# Patient Record
Sex: MALE | Race: WHITE | NOT HISPANIC OR LATINO | Employment: OTHER | URBAN - METROPOLITAN AREA
[De-identification: names, ages, dates, MRNs, and addresses within clinical notes are randomized per-mention and may not be internally consistent; named-entity substitution may affect disease eponyms.]

---

## 2023-04-24 LAB
ANION GAP IN SER/PLAS: 12 MMOL/L (ref 10–20)
CALCIUM (MG/DL) IN SER/PLAS: 9.5 MG/DL (ref 8.6–10.3)
CARBON DIOXIDE, TOTAL (MMOL/L) IN SER/PLAS: 31 MMOL/L (ref 21–32)
CHLORIDE (MMOL/L) IN SER/PLAS: 101 MMOL/L (ref 98–107)
CREATININE (MG/DL) IN SER/PLAS: 0.96 MG/DL (ref 0.5–1.3)
ERYTHROCYTE DISTRIBUTION WIDTH (RATIO) BY AUTOMATED COUNT: 13.4 % (ref 11.5–14.5)
ERYTHROCYTE MEAN CORPUSCULAR HEMOGLOBIN CONCENTRATION (G/DL) BY AUTOMATED: 32.2 G/DL (ref 32–36)
ERYTHROCYTE MEAN CORPUSCULAR VOLUME (FL) BY AUTOMATED COUNT: 91 FL (ref 80–100)
ERYTHROCYTES (10*6/UL) IN BLOOD BY AUTOMATED COUNT: 5.4 X10E12/L (ref 4.5–5.9)
GFR MALE: 79 ML/MIN/1.73M2
GLUCOSE (MG/DL) IN SER/PLAS: 74 MG/DL (ref 74–99)
HEMATOCRIT (%) IN BLOOD BY AUTOMATED COUNT: 49 % (ref 41–52)
HEMOGLOBIN (G/DL) IN BLOOD: 15.8 G/DL (ref 13.5–17.5)
LEUKOCYTES (10*3/UL) IN BLOOD BY AUTOMATED COUNT: 4.9 X10E9/L (ref 4.4–11.3)
PLATELETS (10*3/UL) IN BLOOD AUTOMATED COUNT: 210 X10E9/L (ref 150–450)
POTASSIUM (MMOL/L) IN SER/PLAS: 4 MMOL/L (ref 3.5–5.3)
SODIUM (MMOL/L) IN SER/PLAS: 140 MMOL/L (ref 136–145)
UREA NITROGEN (MG/DL) IN SER/PLAS: 14 MG/DL (ref 6–23)

## 2023-08-16 LAB
ALBUMIN (G/DL) IN SER/PLAS: 4.6 G/DL (ref 3.4–5)
ANION GAP IN SER/PLAS: 14 MMOL/L (ref 10–20)
CALCIUM (MG/DL) IN SER/PLAS: 9.3 MG/DL (ref 8.6–10.3)
CARBON DIOXIDE, TOTAL (MMOL/L) IN SER/PLAS: 30 MMOL/L (ref 21–32)
CHLORIDE (MMOL/L) IN SER/PLAS: 97 MMOL/L (ref 98–107)
CREATININE (MG/DL) IN SER/PLAS: 0.95 MG/DL (ref 0.5–1.3)
GFR MALE: 80 ML/MIN/1.73M2
GLUCOSE (MG/DL) IN SER/PLAS: 121 MG/DL (ref 74–99)
PHOSPHATE (MG/DL) IN SER/PLAS: 3.2 MG/DL (ref 2.5–4.9)
POTASSIUM (MMOL/L) IN SER/PLAS: 4.8 MMOL/L (ref 3.5–5.3)
SODIUM (MMOL/L) IN SER/PLAS: 136 MMOL/L (ref 136–145)
UREA NITROGEN (MG/DL) IN SER/PLAS: 11 MG/DL (ref 6–23)

## 2024-06-26 ENCOUNTER — APPOINTMENT (OUTPATIENT)
Dept: RADIOLOGY | Facility: HOSPITAL | Age: 84
End: 2024-06-26
Payer: MEDICARE

## 2024-06-26 ENCOUNTER — APPOINTMENT (OUTPATIENT)
Dept: CARDIOLOGY | Facility: HOSPITAL | Age: 84
End: 2024-06-26
Payer: MEDICARE

## 2024-06-26 ENCOUNTER — HOSPITAL ENCOUNTER (INPATIENT)
Facility: HOSPITAL | Age: 84
End: 2024-06-26
Attending: STUDENT IN AN ORGANIZED HEALTH CARE EDUCATION/TRAINING PROGRAM | Admitting: INTERNAL MEDICINE
Payer: MEDICARE

## 2024-06-26 DIAGNOSIS — S22.32XA CLOSED FRACTURE OF ONE RIB OF LEFT SIDE, INITIAL ENCOUNTER: Primary | ICD-10-CM

## 2024-06-26 DIAGNOSIS — R09.02 HYPOXIA: ICD-10-CM

## 2024-06-26 PROBLEM — S22.31XD CLOSED FRACTURE OF ONE RIB OF RIGHT SIDE WITH ROUTINE HEALING: Status: ACTIVE | Noted: 2024-06-26

## 2024-06-26 LAB
ABO GROUP (TYPE) IN BLOOD: NORMAL
ALBUMIN SERPL BCP-MCNC: 4.7 G/DL (ref 3.4–5)
ALP SERPL-CCNC: 59 U/L (ref 33–136)
ALT SERPL W P-5'-P-CCNC: 20 U/L (ref 10–52)
ANION GAP SERPL CALC-SCNC: 13 MMOL/L (ref 10–20)
ANTIBODY SCREEN: NORMAL
AST SERPL W P-5'-P-CCNC: 21 U/L (ref 9–39)
BASOPHILS # BLD AUTO: 0.08 X10*3/UL (ref 0–0.1)
BASOPHILS NFR BLD AUTO: 1 %
BILIRUB SERPL-MCNC: 0.6 MG/DL (ref 0–1.2)
BUN SERPL-MCNC: 16 MG/DL (ref 6–23)
CALCIUM SERPL-MCNC: 9.5 MG/DL (ref 8.6–10.3)
CHLORIDE SERPL-SCNC: 98 MMOL/L (ref 98–107)
CO2 SERPL-SCNC: 29 MMOL/L (ref 21–32)
CREAT SERPL-MCNC: 1.04 MG/DL (ref 0.5–1.3)
EGFRCR SERPLBLD CKD-EPI 2021: 72 ML/MIN/1.73M*2
EOSINOPHIL # BLD AUTO: 0.19 X10*3/UL (ref 0–0.4)
EOSINOPHIL NFR BLD AUTO: 2.3 %
ERYTHROCYTE [DISTWIDTH] IN BLOOD BY AUTOMATED COUNT: 13.2 % (ref 11.5–14.5)
ETHANOL SERPL-MCNC: <10 MG/DL
GLUCOSE SERPL-MCNC: 126 MG/DL (ref 74–99)
HCT VFR BLD AUTO: 45.6 % (ref 41–52)
HGB BLD-MCNC: 15.2 G/DL (ref 13.5–17.5)
IMM GRANULOCYTES # BLD AUTO: 0.19 X10*3/UL (ref 0–0.5)
IMM GRANULOCYTES NFR BLD AUTO: 2.3 % (ref 0–0.9)
INR PPP: 0.9 (ref 0.9–1.1)
LACTATE SERPL-SCNC: 1.9 MMOL/L (ref 0.4–2)
LYMPHOCYTES # BLD AUTO: 2.3 X10*3/UL (ref 0.8–3)
LYMPHOCYTES NFR BLD AUTO: 27.7 %
MCH RBC QN AUTO: 29.9 PG (ref 26–34)
MCHC RBC AUTO-ENTMCNC: 33.3 G/DL (ref 32–36)
MCV RBC AUTO: 90 FL (ref 80–100)
MONOCYTES # BLD AUTO: 1.04 X10*3/UL (ref 0.05–0.8)
MONOCYTES NFR BLD AUTO: 12.5 %
NEUTROPHILS # BLD AUTO: 4.5 X10*3/UL (ref 1.6–5.5)
NEUTROPHILS NFR BLD AUTO: 54.2 %
NRBC BLD-RTO: 0 /100 WBCS (ref 0–0)
PLATELET # BLD AUTO: 203 X10*3/UL (ref 150–450)
POTASSIUM SERPL-SCNC: 3.8 MMOL/L (ref 3.5–5.3)
PROT SERPL-MCNC: 7.4 G/DL (ref 6.4–8.2)
PROTHROMBIN TIME: 10.5 SECONDS (ref 9.8–12.8)
RBC # BLD AUTO: 5.08 X10*6/UL (ref 4.5–5.9)
RH FACTOR (ANTIGEN D): NORMAL
SODIUM SERPL-SCNC: 136 MMOL/L (ref 136–145)
WBC # BLD AUTO: 8.3 X10*3/UL (ref 4.4–11.3)

## 2024-06-26 PROCEDURE — 86901 BLOOD TYPING SEROLOGIC RH(D): CPT | Performed by: STUDENT IN AN ORGANIZED HEALTH CARE EDUCATION/TRAINING PROGRAM

## 2024-06-26 PROCEDURE — 72125 CT NECK SPINE W/O DYE: CPT

## 2024-06-26 PROCEDURE — 96376 TX/PRO/DX INJ SAME DRUG ADON: CPT

## 2024-06-26 PROCEDURE — 2500000004 HC RX 250 GENERAL PHARMACY W/ HCPCS (ALT 636 FOR OP/ED): Performed by: STUDENT IN AN ORGANIZED HEALTH CARE EDUCATION/TRAINING PROGRAM

## 2024-06-26 PROCEDURE — 99291 CRITICAL CARE FIRST HOUR: CPT | Mod: 25 | Performed by: STUDENT IN AN ORGANIZED HEALTH CARE EDUCATION/TRAINING PROGRAM

## 2024-06-26 PROCEDURE — 72128 CT CHEST SPINE W/O DYE: CPT | Mod: RCN

## 2024-06-26 PROCEDURE — 72170 X-RAY EXAM OF PELVIS: CPT

## 2024-06-26 PROCEDURE — 82077 ASSAY SPEC XCP UR&BREATH IA: CPT | Performed by: STUDENT IN AN ORGANIZED HEALTH CARE EDUCATION/TRAINING PROGRAM

## 2024-06-26 PROCEDURE — 96374 THER/PROPH/DIAG INJ IV PUSH: CPT

## 2024-06-26 PROCEDURE — 85025 COMPLETE CBC W/AUTO DIFF WBC: CPT | Performed by: STUDENT IN AN ORGANIZED HEALTH CARE EDUCATION/TRAINING PROGRAM

## 2024-06-26 PROCEDURE — 72131 CT LUMBAR SPINE W/O DYE: CPT | Mod: RCN | Performed by: RADIOLOGY

## 2024-06-26 PROCEDURE — 72128 CT CHEST SPINE W/O DYE: CPT | Mod: RCN | Performed by: RADIOLOGY

## 2024-06-26 PROCEDURE — 2500000004 HC RX 250 GENERAL PHARMACY W/ HCPCS (ALT 636 FOR OP/ED)

## 2024-06-26 PROCEDURE — 85610 PROTHROMBIN TIME: CPT | Performed by: STUDENT IN AN ORGANIZED HEALTH CARE EDUCATION/TRAINING PROGRAM

## 2024-06-26 PROCEDURE — 99223 1ST HOSP IP/OBS HIGH 75: CPT | Performed by: INTERNAL MEDICINE

## 2024-06-26 PROCEDURE — 70450 CT HEAD/BRAIN W/O DYE: CPT | Performed by: RADIOLOGY

## 2024-06-26 PROCEDURE — 80053 COMPREHEN METABOLIC PANEL: CPT | Performed by: STUDENT IN AN ORGANIZED HEALTH CARE EDUCATION/TRAINING PROGRAM

## 2024-06-26 PROCEDURE — 70450 CT HEAD/BRAIN W/O DYE: CPT

## 2024-06-26 PROCEDURE — 72131 CT LUMBAR SPINE W/O DYE: CPT | Mod: RCN

## 2024-06-26 PROCEDURE — 86850 RBC ANTIBODY SCREEN: CPT | Performed by: STUDENT IN AN ORGANIZED HEALTH CARE EDUCATION/TRAINING PROGRAM

## 2024-06-26 PROCEDURE — 36415 COLL VENOUS BLD VENIPUNCTURE: CPT | Performed by: STUDENT IN AN ORGANIZED HEALTH CARE EDUCATION/TRAINING PROGRAM

## 2024-06-26 PROCEDURE — 73630 X-RAY EXAM OF FOOT: CPT | Mod: LEFT SIDE | Performed by: RADIOLOGY

## 2024-06-26 PROCEDURE — 71045 X-RAY EXAM CHEST 1 VIEW: CPT

## 2024-06-26 PROCEDURE — 71045 X-RAY EXAM CHEST 1 VIEW: CPT | Performed by: RADIOLOGY

## 2024-06-26 PROCEDURE — G0390 TRAUMA RESPONS W/HOSP CRITI: HCPCS

## 2024-06-26 PROCEDURE — 84075 ASSAY ALKALINE PHOSPHATASE: CPT | Performed by: STUDENT IN AN ORGANIZED HEALTH CARE EDUCATION/TRAINING PROGRAM

## 2024-06-26 PROCEDURE — 72125 CT NECK SPINE W/O DYE: CPT | Performed by: RADIOLOGY

## 2024-06-26 PROCEDURE — 96375 TX/PRO/DX INJ NEW DRUG ADDON: CPT

## 2024-06-26 PROCEDURE — 71260 CT THORAX DX C+: CPT | Performed by: RADIOLOGY

## 2024-06-26 PROCEDURE — 93005 ELECTROCARDIOGRAM TRACING: CPT

## 2024-06-26 PROCEDURE — 2550000001 HC RX 255 CONTRASTS: Performed by: STUDENT IN AN ORGANIZED HEALTH CARE EDUCATION/TRAINING PROGRAM

## 2024-06-26 PROCEDURE — 83605 ASSAY OF LACTIC ACID: CPT | Performed by: STUDENT IN AN ORGANIZED HEALTH CARE EDUCATION/TRAINING PROGRAM

## 2024-06-26 PROCEDURE — 74177 CT ABD & PELVIS W/CONTRAST: CPT

## 2024-06-26 PROCEDURE — 73630 X-RAY EXAM OF FOOT: CPT | Mod: LT

## 2024-06-26 PROCEDURE — 74177 CT ABD & PELVIS W/CONTRAST: CPT | Performed by: RADIOLOGY

## 2024-06-26 PROCEDURE — 72170 X-RAY EXAM OF PELVIS: CPT | Performed by: RADIOLOGY

## 2024-06-26 RX ORDER — HYDROMORPHONE HYDROCHLORIDE 1 MG/ML
INJECTION, SOLUTION INTRAMUSCULAR; INTRAVENOUS; SUBCUTANEOUS
Status: COMPLETED
Start: 2024-06-26 | End: 2024-06-26

## 2024-06-26 RX ORDER — ONDANSETRON HYDROCHLORIDE 2 MG/ML
INJECTION, SOLUTION INTRAVENOUS
Status: COMPLETED
Start: 2024-06-26 | End: 2024-06-26

## 2024-06-26 RX ORDER — DOCUSATE SODIUM 100 MG/1
100 CAPSULE, LIQUID FILLED ORAL 2 TIMES DAILY
Status: DISPENSED | OUTPATIENT
Start: 2024-06-26

## 2024-06-26 RX ORDER — KETOROLAC TROMETHAMINE 15 MG/ML
15 INJECTION, SOLUTION INTRAMUSCULAR; INTRAVENOUS EVERY 6 HOURS PRN
Status: DISCONTINUED | OUTPATIENT
Start: 2024-06-26 | End: 2024-06-27

## 2024-06-26 RX ORDER — ONDANSETRON HYDROCHLORIDE 2 MG/ML
4 INJECTION, SOLUTION INTRAVENOUS ONCE
Status: COMPLETED | OUTPATIENT
Start: 2024-06-26 | End: 2024-06-26

## 2024-06-26 RX ORDER — HYDROMORPHONE HYDROCHLORIDE 1 MG/ML
1 INJECTION, SOLUTION INTRAMUSCULAR; INTRAVENOUS; SUBCUTANEOUS ONCE
Status: COMPLETED | OUTPATIENT
Start: 2024-06-26 | End: 2024-06-26

## 2024-06-26 RX ORDER — ACETAMINOPHEN 325 MG/1
650 TABLET ORAL EVERY 4 HOURS PRN
Status: DISCONTINUED | OUTPATIENT
Start: 2024-06-26 | End: 2024-06-27

## 2024-06-26 RX ORDER — ACETAMINOPHEN 650 MG/1
650 SUPPOSITORY RECTAL EVERY 4 HOURS PRN
Status: DISCONTINUED | OUTPATIENT
Start: 2024-06-26 | End: 2024-06-27

## 2024-06-26 RX ORDER — ACETAMINOPHEN 160 MG/5ML
650 SOLUTION ORAL EVERY 4 HOURS PRN
Status: DISCONTINUED | OUTPATIENT
Start: 2024-06-26 | End: 2024-06-27

## 2024-06-26 RX ORDER — LIDOCAINE 560 MG/1
1 PATCH PERCUTANEOUS; TOPICAL; TRANSDERMAL DAILY
Status: DISPENSED | OUTPATIENT
Start: 2024-06-27

## 2024-06-26 RX ADMIN — HYDROMORPHONE HYDROCHLORIDE 1 MG: 1 INJECTION, SOLUTION INTRAMUSCULAR; INTRAVENOUS; SUBCUTANEOUS at 20:49

## 2024-06-26 RX ADMIN — IOHEXOL 100 ML: 350 INJECTION, SOLUTION INTRAVENOUS at 21:19

## 2024-06-26 RX ADMIN — ONDANSETRON HYDROCHLORIDE 4 MG: 2 INJECTION, SOLUTION INTRAVENOUS at 20:48

## 2024-06-26 RX ADMIN — HYDROMORPHONE HYDROCHLORIDE 0.5 MG: 1 INJECTION, SOLUTION INTRAMUSCULAR; INTRAVENOUS; SUBCUTANEOUS at 22:41

## 2024-06-26 RX ADMIN — ONDANSETRON 4 MG: 2 INJECTION INTRAMUSCULAR; INTRAVENOUS at 20:48

## 2024-06-26 ASSESSMENT — PAIN SCALES - GENERAL: PAINLEVEL_OUTOF10: 10 - WORST POSSIBLE PAIN

## 2024-06-26 ASSESSMENT — COLUMBIA-SUICIDE SEVERITY RATING SCALE - C-SSRS
2. HAVE YOU ACTUALLY HAD ANY THOUGHTS OF KILLING YOURSELF?: NO
6. HAVE YOU EVER DONE ANYTHING, STARTED TO DO ANYTHING, OR PREPARED TO DO ANYTHING TO END YOUR LIFE?: NO
1. IN THE PAST MONTH, HAVE YOU WISHED YOU WERE DEAD OR WISHED YOU COULD GO TO SLEEP AND NOT WAKE UP?: NO

## 2024-06-26 ASSESSMENT — LIFESTYLE VARIABLES
EVER HAD A DRINK FIRST THING IN THE MORNING TO STEADY YOUR NERVES TO GET RID OF A HANGOVER: NO
HAVE YOU EVER FELT YOU SHOULD CUT DOWN ON YOUR DRINKING: NO
TOTAL SCORE: 0
EVER FELT BAD OR GUILTY ABOUT YOUR DRINKING: NO
HAVE PEOPLE ANNOYED YOU BY CRITICIZING YOUR DRINKING: NO

## 2024-06-26 ASSESSMENT — PAIN - FUNCTIONAL ASSESSMENT: PAIN_FUNCTIONAL_ASSESSMENT: 0-10

## 2024-06-27 PROBLEM — M79.609 LIMB PAIN: Status: RESOLVED | Noted: 2024-06-27 | Resolved: 2024-06-27

## 2024-06-27 PROBLEM — M25.512 PAIN IN JOINT OF LEFT SHOULDER: Status: RESOLVED | Noted: 2024-06-27 | Resolved: 2024-06-27

## 2024-06-27 PROBLEM — J20.9 ACUTE BRONCHITIS: Status: RESOLVED | Noted: 2024-06-27 | Resolved: 2024-06-27

## 2024-06-27 PROBLEM — M25.511 PAIN IN JOINT OF RIGHT SHOULDER: Status: RESOLVED | Noted: 2024-06-27 | Resolved: 2024-06-27

## 2024-06-27 PROBLEM — R20.0 NUMBNESS: Status: RESOLVED | Noted: 2024-06-27 | Resolved: 2024-06-27

## 2024-06-27 PROBLEM — G56.21 CUBITAL TUNNEL SYNDROME ON RIGHT: Status: ACTIVE | Noted: 2024-06-27

## 2024-06-27 PROBLEM — E55.9 VITAMIN D DEFICIENCY: Status: ACTIVE | Noted: 2024-06-27

## 2024-06-27 PROBLEM — M79.601 PAIN OF RIGHT UPPER EXTREMITY: Status: RESOLVED | Noted: 2024-06-27 | Resolved: 2024-06-27

## 2024-06-27 PROBLEM — R76.8 ANA POSITIVE: Status: ACTIVE | Noted: 2024-06-27

## 2024-06-27 PROBLEM — E78.5 HYPERLIPIDEMIA: Status: ACTIVE | Noted: 2024-06-27

## 2024-06-27 PROBLEM — M65.30 TRIGGER FINGER, ACQUIRED: Status: RESOLVED | Noted: 2024-06-27 | Resolved: 2024-06-27

## 2024-06-27 PROBLEM — I10 HYPERTENSION: Status: ACTIVE | Noted: 2024-06-27

## 2024-06-27 PROBLEM — M47.812 CERVICAL SPONDYLOSIS: Status: ACTIVE | Noted: 2024-06-27

## 2024-06-27 PROBLEM — M62.81 MUSCLE WEAKNESS (GENERALIZED): Status: ACTIVE | Noted: 2024-06-27

## 2024-06-27 PROBLEM — N39.0 ACUTE UTI (URINARY TRACT INFECTION): Status: RESOLVED | Noted: 2024-06-27 | Resolved: 2024-06-27

## 2024-06-27 PROBLEM — G56.01 CARPAL TUNNEL SYNDROME, RIGHT: Status: ACTIVE | Noted: 2024-06-27

## 2024-06-27 PROBLEM — M50.30 DEGENERATION OF CERVICAL INTERVERTEBRAL DISC: Status: ACTIVE | Noted: 2024-06-27

## 2024-06-27 PROBLEM — N40.0 BPH (BENIGN PROSTATIC HYPERPLASIA): Status: ACTIVE | Noted: 2024-06-27

## 2024-06-27 PROBLEM — M19.041 PRIMARY OSTEOARTHRITIS OF RIGHT HAND: Status: ACTIVE | Noted: 2024-06-27

## 2024-06-27 LAB
ANION GAP SERPL CALC-SCNC: 13 MMOL/L (ref 10–20)
APPEARANCE UR: CLEAR
BILIRUB UR STRIP.AUTO-MCNC: NEGATIVE MG/DL
BUN SERPL-MCNC: 24 MG/DL (ref 6–23)
CALCIUM SERPL-MCNC: 9.3 MG/DL (ref 8.6–10.3)
CHLORIDE SERPL-SCNC: 99 MMOL/L (ref 98–107)
CO2 SERPL-SCNC: 28 MMOL/L (ref 21–32)
COLOR UR: YELLOW
CREAT SERPL-MCNC: 1.45 MG/DL (ref 0.5–1.3)
EGFRCR SERPLBLD CKD-EPI 2021: 48 ML/MIN/1.73M*2
ERYTHROCYTE [DISTWIDTH] IN BLOOD BY AUTOMATED COUNT: 13.2 % (ref 11.5–14.5)
GLUCOSE SERPL-MCNC: 180 MG/DL (ref 74–99)
GLUCOSE UR STRIP.AUTO-MCNC: NORMAL MG/DL
HCT VFR BLD AUTO: 45 % (ref 41–52)
HGB BLD-MCNC: 14.6 G/DL (ref 13.5–17.5)
HYALINE CASTS #/AREA URNS AUTO: ABNORMAL /LPF
KETONES UR STRIP.AUTO-MCNC: NEGATIVE MG/DL
LEUKOCYTE ESTERASE UR QL STRIP.AUTO: NEGATIVE
MCH RBC QN AUTO: 29.6 PG (ref 26–34)
MCHC RBC AUTO-ENTMCNC: 32.4 G/DL (ref 32–36)
MCV RBC AUTO: 91 FL (ref 80–100)
MUCOUS THREADS #/AREA URNS AUTO: ABNORMAL /LPF
NITRITE UR QL STRIP.AUTO: NEGATIVE
NRBC BLD-RTO: 0 /100 WBCS (ref 0–0)
PH UR STRIP.AUTO: 5.5 [PH]
PLATELET # BLD AUTO: 182 X10*3/UL (ref 150–450)
POTASSIUM SERPL-SCNC: 4.8 MMOL/L (ref 3.5–5.3)
PROT UR STRIP.AUTO-MCNC: ABNORMAL MG/DL
RBC # BLD AUTO: 4.93 X10*6/UL (ref 4.5–5.9)
RBC # UR STRIP.AUTO: NEGATIVE /UL
RBC #/AREA URNS AUTO: ABNORMAL /HPF
SODIUM SERPL-SCNC: 135 MMOL/L (ref 136–145)
SP GR UR STRIP.AUTO: 1.02
UROBILINOGEN UR STRIP.AUTO-MCNC: ABNORMAL MG/DL
WBC # BLD AUTO: 11 X10*3/UL (ref 4.4–11.3)
WBC #/AREA URNS AUTO: ABNORMAL /HPF

## 2024-06-27 PROCEDURE — 90471 IMMUNIZATION ADMIN: CPT | Performed by: STUDENT IN AN ORGANIZED HEALTH CARE EDUCATION/TRAINING PROGRAM

## 2024-06-27 PROCEDURE — 2500000001 HC RX 250 WO HCPCS SELF ADMINISTERED DRUGS (ALT 637 FOR MEDICARE OP): Performed by: INTERNAL MEDICINE

## 2024-06-27 PROCEDURE — 99222 1ST HOSP IP/OBS MODERATE 55: CPT | Performed by: SURGERY

## 2024-06-27 PROCEDURE — 85027 COMPLETE CBC AUTOMATED: CPT | Performed by: NURSE PRACTITIONER

## 2024-06-27 PROCEDURE — 80048 BASIC METABOLIC PNL TOTAL CA: CPT | Performed by: NURSE PRACTITIONER

## 2024-06-27 PROCEDURE — 2500000004 HC RX 250 GENERAL PHARMACY W/ HCPCS (ALT 636 FOR OP/ED): Performed by: NURSE PRACTITIONER

## 2024-06-27 PROCEDURE — G0378 HOSPITAL OBSERVATION PER HR: HCPCS

## 2024-06-27 PROCEDURE — 2500000004 HC RX 250 GENERAL PHARMACY W/ HCPCS (ALT 636 FOR OP/ED): Performed by: STUDENT IN AN ORGANIZED HEALTH CARE EDUCATION/TRAINING PROGRAM

## 2024-06-27 PROCEDURE — 97165 OT EVAL LOW COMPLEX 30 MIN: CPT | Mod: GO

## 2024-06-27 PROCEDURE — 90715 TDAP VACCINE 7 YRS/> IM: CPT | Performed by: STUDENT IN AN ORGANIZED HEALTH CARE EDUCATION/TRAINING PROGRAM

## 2024-06-27 PROCEDURE — 81003 URINALYSIS AUTO W/O SCOPE: CPT | Performed by: PHYSICIAN ASSISTANT

## 2024-06-27 PROCEDURE — 99232 SBSQ HOSP IP/OBS MODERATE 35: CPT | Performed by: PHYSICIAN ASSISTANT

## 2024-06-27 PROCEDURE — 36415 COLL VENOUS BLD VENIPUNCTURE: CPT | Performed by: NURSE PRACTITIONER

## 2024-06-27 PROCEDURE — 97530 THERAPEUTIC ACTIVITIES: CPT | Mod: GO

## 2024-06-27 PROCEDURE — 2500000005 HC RX 250 GENERAL PHARMACY W/O HCPCS: Performed by: NURSE PRACTITIONER

## 2024-06-27 PROCEDURE — 2500000004 HC RX 250 GENERAL PHARMACY W/ HCPCS (ALT 636 FOR OP/ED): Performed by: INTERNAL MEDICINE

## 2024-06-27 PROCEDURE — 2500000001 HC RX 250 WO HCPCS SELF ADMINISTERED DRUGS (ALT 637 FOR MEDICARE OP): Performed by: NURSE PRACTITIONER

## 2024-06-27 PROCEDURE — 2500000001 HC RX 250 WO HCPCS SELF ADMINISTERED DRUGS (ALT 637 FOR MEDICARE OP): Performed by: PHYSICIAN ASSISTANT

## 2024-06-27 PROCEDURE — 2500000005 HC RX 250 GENERAL PHARMACY W/O HCPCS: Performed by: PHYSICIAN ASSISTANT

## 2024-06-27 PROCEDURE — 81001 URINALYSIS AUTO W/SCOPE: CPT | Performed by: PHYSICIAN ASSISTANT

## 2024-06-27 RX ORDER — CYCLOBENZAPRINE HCL 5 MG
5 TABLET ORAL 3 TIMES DAILY PRN
Status: DISPENSED | OUTPATIENT
Start: 2024-06-27

## 2024-06-27 RX ORDER — NAPROXEN SODIUM 220 MG/1
81 TABLET, FILM COATED ORAL ONCE
Status: ON HOLD | COMMUNITY
Start: 2023-08-09

## 2024-06-27 RX ORDER — LISINOPRIL 20 MG/1
20 TABLET ORAL DAILY
Status: DISPENSED | OUTPATIENT
Start: 2024-06-27

## 2024-06-27 RX ORDER — TRAMADOL HYDROCHLORIDE 50 MG/1
50 TABLET ORAL EVERY 8 HOURS PRN
Status: DISCONTINUED | OUTPATIENT
Start: 2024-06-27 | End: 2024-06-28

## 2024-06-27 RX ORDER — ROSUVASTATIN CALCIUM 10 MG/1
10 TABLET, COATED ORAL NIGHTLY
Status: DISCONTINUED | OUTPATIENT
Start: 2024-06-27 | End: 2024-06-27

## 2024-06-27 RX ORDER — ROSUVASTATIN CALCIUM 10 MG/1
10 TABLET, COATED ORAL 3 TIMES WEEKLY
Status: DISPENSED | OUTPATIENT
Start: 2024-06-28

## 2024-06-27 RX ORDER — ACEBUTOLOL HYDROCHLORIDE 200 MG/1
200 CAPSULE ORAL 2 TIMES DAILY
Status: DISPENSED | OUTPATIENT
Start: 2024-06-27

## 2024-06-27 RX ORDER — HYDROCHLOROTHIAZIDE 25 MG/1
12.5 TABLET ORAL DAILY
Status: DISPENSED | OUTPATIENT
Start: 2024-06-27

## 2024-06-27 RX ORDER — ASPIRIN 81 MG/1
81 TABLET ORAL DAILY
Status: DISPENSED | OUTPATIENT
Start: 2024-06-27

## 2024-06-27 RX ORDER — ACETAMINOPHEN 325 MG/1
650 TABLET ORAL EVERY 6 HOURS
Status: DISPENSED | OUTPATIENT
Start: 2024-06-27

## 2024-06-27 RX ORDER — ACEBUTOLOL HYDROCHLORIDE 200 MG/1
200 CAPSULE ORAL 2 TIMES DAILY
Status: ON HOLD | COMMUNITY

## 2024-06-27 RX ORDER — ROSUVASTATIN CALCIUM 10 MG/1
10 TABLET, COATED ORAL 3 TIMES WEEKLY
Status: ON HOLD | COMMUNITY

## 2024-06-27 RX ORDER — DEXTROMETHORPHAN HYDROBROMIDE, GUAIFENESIN 5; 100 MG/5ML; MG/5ML
650 LIQUID ORAL 2 TIMES DAILY
Status: ON HOLD | COMMUNITY

## 2024-06-27 RX ORDER — LISINOPRIL AND HYDROCHLOROTHIAZIDE 12.5; 2 MG/1; MG/1
1 TABLET ORAL DAILY
Status: ON HOLD | COMMUNITY

## 2024-06-27 RX ADMIN — KETOROLAC TROMETHAMINE 15 MG: 15 INJECTION, SOLUTION INTRAMUSCULAR; INTRAVENOUS at 09:43

## 2024-06-27 RX ADMIN — ACETAMINOPHEN 650 MG: 325 TABLET ORAL at 21:03

## 2024-06-27 RX ADMIN — ACEBUTOLOL HYDROCHLORIDE 200 MG: 200 CAPSULE ORAL at 20:59

## 2024-06-27 RX ADMIN — DOCUSATE SODIUM 100 MG: 100 CAPSULE, LIQUID FILLED ORAL at 21:03

## 2024-06-27 RX ADMIN — ACETAMINOPHEN 650 MG: 325 TABLET ORAL at 15:11

## 2024-06-27 RX ADMIN — ACEBUTOLOL HYDROCHLORIDE 200 MG: 200 CAPSULE ORAL at 12:38

## 2024-06-27 RX ADMIN — TETANUS TOXOID, REDUCED DIPHTHERIA TOXOID AND ACELLULAR PERTUSSIS VACCINE, ADSORBED 0.5 ML: 5; 2.5; 8; 8; 2.5 SUSPENSION INTRAMUSCULAR at 00:24

## 2024-06-27 RX ADMIN — DOCUSATE SODIUM 100 MG: 100 CAPSULE, LIQUID FILLED ORAL at 09:44

## 2024-06-27 RX ADMIN — LISINOPRIL 20 MG: 20 TABLET ORAL at 09:44

## 2024-06-27 RX ADMIN — SODIUM CHLORIDE 500 ML: 9 INJECTION, SOLUTION INTRAVENOUS at 15:12

## 2024-06-27 RX ADMIN — LIDOCAINE 4% 1 PATCH: 40 PATCH TOPICAL at 09:45

## 2024-06-27 RX ADMIN — CYCLOBENZAPRINE HYDROCHLORIDE 5 MG: 5 TABLET, FILM COATED ORAL at 21:03

## 2024-06-27 RX ADMIN — TRAMADOL HYDROCHLORIDE 50 MG: 50 TABLET ORAL at 19:30

## 2024-06-27 RX ADMIN — ASPIRIN 81 MG: 81 TABLET, COATED ORAL at 12:38

## 2024-06-27 RX ADMIN — ACETAMINOPHEN 650 MG: 325 TABLET ORAL at 09:50

## 2024-06-27 RX ADMIN — KETOROLAC TROMETHAMINE 15 MG: 15 INJECTION, SOLUTION INTRAMUSCULAR; INTRAVENOUS at 00:14

## 2024-06-27 RX ADMIN — Medication 2 L/MIN: at 12:44

## 2024-06-27 SDOH — SOCIAL STABILITY: SOCIAL INSECURITY: HAS ANYONE EVER THREATENED TO HURT YOUR FAMILY OR YOUR PETS?: NO

## 2024-06-27 SDOH — SOCIAL STABILITY: SOCIAL INSECURITY: WERE YOU ABLE TO COMPLETE ALL THE BEHAVIORAL HEALTH SCREENINGS?: YES

## 2024-06-27 SDOH — SOCIAL STABILITY: SOCIAL INSECURITY: DO YOU FEEL UNSAFE GOING BACK TO THE PLACE WHERE YOU ARE LIVING?: NO

## 2024-06-27 SDOH — SOCIAL STABILITY: SOCIAL INSECURITY: HAVE YOU HAD ANY THOUGHTS OF HARMING ANYONE ELSE?: NO

## 2024-06-27 SDOH — SOCIAL STABILITY: SOCIAL INSECURITY: ABUSE: ADULT

## 2024-06-27 SDOH — SOCIAL STABILITY: SOCIAL INSECURITY: DOES ANYONE TRY TO KEEP YOU FROM HAVING/CONTACTING OTHER FRIENDS OR DOING THINGS OUTSIDE YOUR HOME?: NO

## 2024-06-27 SDOH — SOCIAL STABILITY: SOCIAL INSECURITY: ARE YOU OR HAVE YOU BEEN THREATENED OR ABUSED PHYSICALLY, EMOTIONALLY, OR SEXUALLY BY ANYONE?: NO

## 2024-06-27 SDOH — SOCIAL STABILITY: SOCIAL INSECURITY: HAVE YOU HAD THOUGHTS OF HARMING ANYONE ELSE?: NO

## 2024-06-27 SDOH — SOCIAL STABILITY: SOCIAL INSECURITY: DO YOU FEEL ANYONE HAS EXPLOITED OR TAKEN ADVANTAGE OF YOU FINANCIALLY OR OF YOUR PERSONAL PROPERTY?: NO

## 2024-06-27 ASSESSMENT — COGNITIVE AND FUNCTIONAL STATUS - GENERAL
MOBILITY SCORE: 12
TURNING FROM BACK TO SIDE WHILE IN FLAT BAD: A LOT
DRESSING REGULAR UPPER BODY CLOTHING: A LOT
HELP NEEDED FOR BATHING: A LOT
STANDING UP FROM CHAIR USING ARMS: A LOT
DAILY ACTIVITIY SCORE: 15
CLIMB 3 TO 5 STEPS WITH RAILING: A LOT
CLIMB 3 TO 5 STEPS WITH RAILING: A LOT
DRESSING REGULAR UPPER BODY CLOTHING: A LOT
MOBILITY SCORE: 12
DRESSING REGULAR UPPER BODY CLOTHING: A LOT
PERSONAL GROOMING: A LITTLE
HELP NEEDED FOR BATHING: A LOT
TURNING FROM BACK TO SIDE WHILE IN FLAT BAD: A LOT
CLIMB 3 TO 5 STEPS WITH RAILING: A LOT
MOVING TO AND FROM BED TO CHAIR: A LOT
DRESSING REGULAR UPPER BODY CLOTHING: A LOT
DAILY ACTIVITIY SCORE: 13
DAILY ACTIVITIY SCORE: 15
DRESSING REGULAR LOWER BODY CLOTHING: A LOT
TOILETING: A LITTLE
PATIENT BASELINE BEDBOUND: NO
WALKING IN HOSPITAL ROOM: A LOT
DRESSING REGULAR LOWER BODY CLOTHING: A LOT
MOVING FROM LYING ON BACK TO SITTING ON SIDE OF FLAT BED WITH BEDRAILS: A LOT
WALKING IN HOSPITAL ROOM: A LOT
MOBILITY SCORE: 12
WALKING IN HOSPITAL ROOM: A LOT
MOVING FROM LYING ON BACK TO SITTING ON SIDE OF FLAT BED WITH BEDRAILS: A LOT
MOVING TO AND FROM BED TO CHAIR: A LOT
DAILY ACTIVITIY SCORE: 16
TURNING FROM BACK TO SIDE WHILE IN FLAT BAD: A LOT
TOILETING: A LOT
EATING MEALS: A LITTLE
MOVING TO AND FROM BED TO CHAIR: A LOT
DRESSING REGULAR LOWER BODY CLOTHING: A LOT
TOILETING: A LOT
TOILETING: A LOT
DRESSING REGULAR LOWER BODY CLOTHING: TOTAL
PERSONAL GROOMING: A LITTLE
HELP NEEDED FOR BATHING: A LOT
STANDING UP FROM CHAIR USING ARMS: A LOT
HELP NEEDED FOR BATHING: A LOT
STANDING UP FROM CHAIR USING ARMS: A LOT
PERSONAL GROOMING: A LITTLE
MOVING FROM LYING ON BACK TO SITTING ON SIDE OF FLAT BED WITH BEDRAILS: A LOT
PERSONAL GROOMING: A LITTLE

## 2024-06-27 ASSESSMENT — ENCOUNTER SYMPTOMS
GASTROINTESTINAL NEGATIVE: 1
NEUROLOGICAL NEGATIVE: 1
HEMATOLOGIC/LYMPHATIC NEGATIVE: 1
MUSCULOSKELETAL NEGATIVE: 1
CONSTITUTIONAL NEGATIVE: 1
ENDOCRINE NEGATIVE: 1
EYES NEGATIVE: 1
ALLERGIC/IMMUNOLOGIC NEGATIVE: 1
PSYCHIATRIC NEGATIVE: 1

## 2024-06-27 ASSESSMENT — LIFESTYLE VARIABLES
HOW OFTEN DO YOU HAVE A DRINK CONTAINING ALCOHOL: NEVER
HOW OFTEN DO YOU HAVE 6 OR MORE DRINKS ON ONE OCCASION: NEVER
SKIP TO QUESTIONS 9-10: 1
AUDIT-C TOTAL SCORE: 0
AUDIT-C TOTAL SCORE: 0
HOW MANY STANDARD DRINKS CONTAINING ALCOHOL DO YOU HAVE ON A TYPICAL DAY: PATIENT DOES NOT DRINK

## 2024-06-27 ASSESSMENT — PAIN DESCRIPTION - LOCATION
LOCATION: RIB CAGE

## 2024-06-27 ASSESSMENT — PAIN DESCRIPTION - ORIENTATION
ORIENTATION: LEFT

## 2024-06-27 ASSESSMENT — PATIENT HEALTH QUESTIONNAIRE - PHQ9
1. LITTLE INTEREST OR PLEASURE IN DOING THINGS: NOT AT ALL
SUM OF ALL RESPONSES TO PHQ9 QUESTIONS 1 & 2: 0
2. FEELING DOWN, DEPRESSED OR HOPELESS: NOT AT ALL

## 2024-06-27 ASSESSMENT — ACTIVITIES OF DAILY LIVING (ADL)
ASSISTIVE_DEVICE: HEARING AID - RIGHT;HEARING AID - LEFT
JUDGMENT_ADEQUATE_SAFELY_COMPLETE_DAILY_ACTIVITIES: YES
PATIENT'S MEMORY ADEQUATE TO SAFELY COMPLETE DAILY ACTIVITIES?: YES
DRESSING YOURSELF: INDEPENDENT
WALKS IN HOME: INDEPENDENT
ADL_ASSISTANCE: INDEPENDENT
LACK_OF_TRANSPORTATION: NO
FEEDING YOURSELF: INDEPENDENT
TOILETING: INDEPENDENT
ADEQUATE_TO_COMPLETE_ADL: YES
HEARING - RIGHT EAR: HEARING AID
BATHING: INDEPENDENT
GROOMING: INDEPENDENT
HEARING - LEFT EAR: HEARING AID

## 2024-06-27 ASSESSMENT — PAIN - FUNCTIONAL ASSESSMENT
PAIN_FUNCTIONAL_ASSESSMENT: 0-10

## 2024-06-27 ASSESSMENT — PAIN SCALES - GENERAL
PAINLEVEL_OUTOF10: 6
PAINLEVEL_OUTOF10: 8
PAINLEVEL_OUTOF10: 4
PAINLEVEL_OUTOF10: 8
PAINLEVEL_OUTOF10: 6
PAINLEVEL_OUTOF10: 6
PAINLEVEL_OUTOF10: 7
PAINLEVEL_OUTOF10: 0 - NO PAIN
PAINLEVEL_OUTOF10: 8
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
## 2024-06-27 NOTE — CARE PLAN
Problem: Skin  Goal: Decreased wound size/increased tissue granulation at next dressing change  Outcome: Progressing  Goal: Prevent/manage excess moisture  Outcome: Progressing  Goal: Prevent/minimize sheer/friction injuries  Outcome: Progressing   The patient's goals for the shift include  BUZZ    The clinical goals for the shift include no change in neuro checks    Over the shift, the patient did not make progress toward the following goals. Barriers to progression include none. Recommendations to address these barriers include none.
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
## 2024-06-27 NOTE — PROGRESS NOTES
06/27/24 1029   Discharge Planning   Living Arrangements Children   Support Systems Children   Assistance Needed Patient is A&Ox3, on room air at baseline, is independent with ADL's and uses no DME at home (but has devices if needed), drives. Patient denies further needs upon discharge   Type of Residence Private residence   Number of Stairs to Enter Residence 3   Number of Stairs Within Residence 10  (from basement to first floor)   Who is requesting discharge planning? Provider   Home or Post Acute Services None   Patient expects to be discharged to: PT/OT evals pending.   Does the patient need discharge transport arranged? No
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
## 2024-06-27 NOTE — ED PROCEDURE NOTE
Procedure  Critical Care    Performed by: Susanne Ellsworth DO  Authorized by: Susanne Ellsworth DO    Critical care provider statement:     Critical care time (minutes):  35    Critical care time was exclusive of:  Separately billable procedures and treating other patients    Critical care was necessary to treat or prevent imminent or life-threatening deterioration of the following conditions:  Trauma    Critical care was time spent personally by me on the following activities:  Blood draw for specimens, development of treatment plan with patient or surrogate, discussions with consultants, discussions with primary provider, evaluation of patient's response to treatment, examination of patient, ordering and performing treatments and interventions, ordering and review of laboratory studies, ordering and review of radiographic studies, pulse oximetry and re-evaluation of patient's condition    Care discussed with: admitting provider                 Susanne Ellsworth DO  06/26/24 9979

## 2024-06-28 ENCOUNTER — APPOINTMENT (OUTPATIENT)
Dept: RADIOLOGY | Facility: HOSPITAL | Age: 84
End: 2024-06-28
Payer: MEDICARE

## 2024-06-28 LAB
ANION GAP SERPL CALC-SCNC: 14 MMOL/L (ref 10–20)
ATRIAL RATE: 58 BPM
BUN SERPL-MCNC: 37 MG/DL (ref 6–23)
CALCIUM SERPL-MCNC: 8.8 MG/DL (ref 8.6–10.3)
CHLORIDE SERPL-SCNC: 101 MMOL/L (ref 98–107)
CO2 SERPL-SCNC: 25 MMOL/L (ref 21–32)
CREAT SERPL-MCNC: 1.23 MG/DL (ref 0.5–1.3)
EGFRCR SERPLBLD CKD-EPI 2021: 59 ML/MIN/1.73M*2
ERYTHROCYTE [DISTWIDTH] IN BLOOD BY AUTOMATED COUNT: 13.4 % (ref 11.5–14.5)
GLUCOSE SERPL-MCNC: 126 MG/DL (ref 74–99)
HCT VFR BLD AUTO: 42.2 % (ref 41–52)
HGB BLD-MCNC: 13.4 G/DL (ref 13.5–17.5)
HOLD SPECIMEN: NORMAL
MCH RBC QN AUTO: 29.7 PG (ref 26–34)
MCHC RBC AUTO-ENTMCNC: 31.8 G/DL (ref 32–36)
MCV RBC AUTO: 94 FL (ref 80–100)
NRBC BLD-RTO: 0 /100 WBCS (ref 0–0)
PLATELET # BLD AUTO: 149 X10*3/UL (ref 150–450)
POTASSIUM SERPL-SCNC: 4.6 MMOL/L (ref 3.5–5.3)
PR INTERVAL: 254 MS
Q ONSET: 225 MS
QRS COUNT: 12 BEATS
QRS DURATION: 140 MS
QT INTERVAL: 436 MS
QTC CALCULATION(BAZETT): 477 MS
QTC FREDERICIA: 463 MS
R AXIS: -78 DEGREES
RBC # BLD AUTO: 4.51 X10*6/UL (ref 4.5–5.9)
SODIUM SERPL-SCNC: 135 MMOL/L (ref 136–145)
T AXIS: 63 DEGREES
T OFFSET: 443 MS
VENTRICULAR RATE: 72 BPM
WBC # BLD AUTO: 9.9 X10*3/UL (ref 4.4–11.3)

## 2024-06-28 PROCEDURE — 2500000001 HC RX 250 WO HCPCS SELF ADMINISTERED DRUGS (ALT 637 FOR MEDICARE OP): Performed by: INTERNAL MEDICINE

## 2024-06-28 PROCEDURE — G0378 HOSPITAL OBSERVATION PER HR: HCPCS

## 2024-06-28 PROCEDURE — 99232 SBSQ HOSP IP/OBS MODERATE 35: CPT | Performed by: INTERNAL MEDICINE

## 2024-06-28 PROCEDURE — 80048 BASIC METABOLIC PNL TOTAL CA: CPT | Performed by: PHYSICIAN ASSISTANT

## 2024-06-28 PROCEDURE — 73030 X-RAY EXAM OF SHOULDER: CPT | Mod: LT

## 2024-06-28 PROCEDURE — 2500000004 HC RX 250 GENERAL PHARMACY W/ HCPCS (ALT 636 FOR OP/ED): Performed by: NURSE PRACTITIONER

## 2024-06-28 PROCEDURE — 2500000001 HC RX 250 WO HCPCS SELF ADMINISTERED DRUGS (ALT 637 FOR MEDICARE OP): Performed by: PHYSICIAN ASSISTANT

## 2024-06-28 PROCEDURE — 2500000001 HC RX 250 WO HCPCS SELF ADMINISTERED DRUGS (ALT 637 FOR MEDICARE OP): Performed by: NURSE PRACTITIONER

## 2024-06-28 PROCEDURE — 36415 COLL VENOUS BLD VENIPUNCTURE: CPT | Performed by: PHYSICIAN ASSISTANT

## 2024-06-28 PROCEDURE — 2500000005 HC RX 250 GENERAL PHARMACY W/O HCPCS: Performed by: PHYSICIAN ASSISTANT

## 2024-06-28 PROCEDURE — 85027 COMPLETE CBC AUTOMATED: CPT | Performed by: PHYSICIAN ASSISTANT

## 2024-06-28 PROCEDURE — 73030 X-RAY EXAM OF SHOULDER: CPT | Mod: LEFT SIDE | Performed by: RADIOLOGY

## 2024-06-28 PROCEDURE — 2500000002 HC RX 250 W HCPCS SELF ADMINISTERED DRUGS (ALT 637 FOR MEDICARE OP, ALT 636 FOR OP/ED): Performed by: PHYSICIAN ASSISTANT

## 2024-06-28 PROCEDURE — 2500000005 HC RX 250 GENERAL PHARMACY W/O HCPCS: Performed by: NURSE PRACTITIONER

## 2024-06-28 PROCEDURE — 96372 THER/PROPH/DIAG INJ SC/IM: CPT | Performed by: NURSE PRACTITIONER

## 2024-06-28 PROCEDURE — 97162 PT EVAL MOD COMPLEX 30 MIN: CPT | Mod: GP

## 2024-06-28 RX ORDER — ENOXAPARIN SODIUM 100 MG/ML
40 INJECTION SUBCUTANEOUS DAILY
Status: DISPENSED | OUTPATIENT
Start: 2024-06-28

## 2024-06-28 RX ORDER — TRAMADOL HYDROCHLORIDE 50 MG/1
50 TABLET ORAL EVERY 6 HOURS PRN
Status: DISPENSED | OUTPATIENT
Start: 2024-06-28

## 2024-06-28 RX ADMIN — ACETAMINOPHEN 650 MG: 325 TABLET ORAL at 10:10

## 2024-06-28 RX ADMIN — ACETAMINOPHEN 650 MG: 325 TABLET ORAL at 21:56

## 2024-06-28 RX ADMIN — CYCLOBENZAPRINE HYDROCHLORIDE 5 MG: 5 TABLET, FILM COATED ORAL at 14:45

## 2024-06-28 RX ADMIN — CYCLOBENZAPRINE HYDROCHLORIDE 5 MG: 5 TABLET, FILM COATED ORAL at 08:11

## 2024-06-28 RX ADMIN — CYCLOBENZAPRINE HYDROCHLORIDE 5 MG: 5 TABLET, FILM COATED ORAL at 21:56

## 2024-06-28 RX ADMIN — ACEBUTOLOL HYDROCHLORIDE 200 MG: 200 CAPSULE ORAL at 20:37

## 2024-06-28 RX ADMIN — DOCUSATE SODIUM 100 MG: 100 CAPSULE, LIQUID FILLED ORAL at 08:12

## 2024-06-28 RX ADMIN — TRAMADOL HYDROCHLORIDE 50 MG: 50 TABLET, COATED ORAL at 10:10

## 2024-06-28 RX ADMIN — ASPIRIN 81 MG: 81 TABLET, COATED ORAL at 08:11

## 2024-06-28 RX ADMIN — TRAMADOL HYDROCHLORIDE 50 MG: 50 TABLET ORAL at 02:18

## 2024-06-28 RX ADMIN — ROSUVASTATIN CALCIUM 10 MG: 10 TABLET, FILM COATED ORAL at 08:11

## 2024-06-28 RX ADMIN — ENOXAPARIN SODIUM 40 MG: 40 INJECTION SUBCUTANEOUS at 18:18

## 2024-06-28 RX ADMIN — LIDOCAINE 4% 1 PATCH: 40 PATCH TOPICAL at 08:11

## 2024-06-28 RX ADMIN — ACEBUTOLOL HYDROCHLORIDE 200 MG: 200 CAPSULE ORAL at 08:11

## 2024-06-28 RX ADMIN — ACETAMINOPHEN 650 MG: 325 TABLET ORAL at 04:00

## 2024-06-28 RX ADMIN — ACETAMINOPHEN 650 MG: 325 TABLET ORAL at 16:26

## 2024-06-28 RX ADMIN — Medication 2 L/MIN: at 08:18

## 2024-06-28 RX ADMIN — TRAMADOL HYDROCHLORIDE 50 MG: 50 TABLET, COATED ORAL at 16:26

## 2024-06-28 RX ADMIN — DOCUSATE SODIUM 100 MG: 100 CAPSULE, LIQUID FILLED ORAL at 20:38

## 2024-06-28 ASSESSMENT — COGNITIVE AND FUNCTIONAL STATUS - GENERAL
TURNING FROM BACK TO SIDE WHILE IN FLAT BAD: A LOT
MOVING FROM LYING ON BACK TO SITTING ON SIDE OF FLAT BED WITH BEDRAILS: A LOT
PERSONAL GROOMING: A LITTLE
MOBILITY SCORE: 6
STANDING UP FROM CHAIR USING ARMS: A LOT
MOVING TO AND FROM BED TO CHAIR: TOTAL
MOVING FROM LYING ON BACK TO SITTING ON SIDE OF FLAT BED WITH BEDRAILS: TOTAL
STANDING UP FROM CHAIR USING ARMS: A LOT
TOILETING: A LITTLE
PERSONAL GROOMING: A LITTLE
MOVING TO AND FROM BED TO CHAIR: A LOT
HELP NEEDED FOR BATHING: A LOT
WALKING IN HOSPITAL ROOM: TOTAL
TURNING FROM BACK TO SIDE WHILE IN FLAT BAD: TOTAL
DRESSING REGULAR LOWER BODY CLOTHING: A LOT
TURNING FROM BACK TO SIDE WHILE IN FLAT BAD: A LOT
DRESSING REGULAR LOWER BODY CLOTHING: A LOT
CLIMB 3 TO 5 STEPS WITH RAILING: TOTAL
WALKING IN HOSPITAL ROOM: A LOT
HELP NEEDED FOR BATHING: A LOT
CLIMB 3 TO 5 STEPS WITH RAILING: A LOT
MOBILITY SCORE: 12
DAILY ACTIVITIY SCORE: 16
TOILETING: A LITTLE
MOVING TO AND FROM BED TO CHAIR: A LOT
STANDING UP FROM CHAIR USING ARMS: TOTAL
DAILY ACTIVITIY SCORE: 16
DRESSING REGULAR UPPER BODY CLOTHING: A LOT
WALKING IN HOSPITAL ROOM: TOTAL
CLIMB 3 TO 5 STEPS WITH RAILING: TOTAL
MOBILITY SCORE: 10
MOVING FROM LYING ON BACK TO SITTING ON SIDE OF FLAT BED WITH BEDRAILS: A LOT
DRESSING REGULAR UPPER BODY CLOTHING: A LOT

## 2024-06-28 ASSESSMENT — PAIN DESCRIPTION - ORIENTATION
ORIENTATION: LEFT

## 2024-06-28 ASSESSMENT — PAIN SCALES - GENERAL
PAINLEVEL_OUTOF10: 7
PAINLEVEL_OUTOF10: 3
PAINLEVEL_OUTOF10: 10 - WORST POSSIBLE PAIN
PAINLEVEL_OUTOF10: 3
PAINLEVEL_OUTOF10: 3
PAINLEVEL_OUTOF10: 5 - MODERATE PAIN
PAINLEVEL_OUTOF10: 5 - MODERATE PAIN
PAINLEVEL_OUTOF10: 6
PAINLEVEL_OUTOF10: 8

## 2024-06-28 ASSESSMENT — PAIN - FUNCTIONAL ASSESSMENT
PAIN_FUNCTIONAL_ASSESSMENT: 0-10

## 2024-06-28 ASSESSMENT — PAIN DESCRIPTION - LOCATION
LOCATION: RIB CAGE

## 2024-06-28 ASSESSMENT — ACTIVITIES OF DAILY LIVING (ADL): ADL_ASSISTANCE: INDEPENDENT

## 2024-06-28 NOTE — PROGRESS NOTES
Physical Therapy    Physical Therapy Evaluation    Patient Name: Jayson Elizalde  MRN: [id number]  Today's Date: 6/28/2024   Time Calculation  Start Time: 0941  Stop Time: 1004  Time Calculation (min): 23 min    Assessment/Plan   PT Assessment  PT Assessment Results: Decreased mobility  Rehab Prognosis: Good  Barriers to Discharge: Significant pain  Evaluation/Treatment Tolerance: Patient limited by pain  Medical Staff Made Aware: Yes  Strengths: Ability to acquire knowledge, Capable of completing ADLs semi/independent, Housing layout  Barriers to Participation: Comorbidities, Other (Comment) (Significant pain)  End of Session Communication: Bedside nurse  Assessment Comment: Pt limited in PT eval due to significant pain. Unable to tolerate bed mobility and static sitting for more than 5 seconds. Provided pillow between trunk and L UE for support. Notified nursing and physician of pain levels, physician will assess. Recommend mod intensity therpay to return to baseline.  End of Session Patient Position: Bed, 3 rail up, Alarm on     PT Plan  Treatment/Interventions: Bed mobility, Transfer training, Gait training, Stair training, Strengthening, Endurance training  PT Plan: Ongoing PT  PT Frequency: 3 times per week  PT Discharge Recommendations: Moderate intensity level of continued care  Equipment Recommended upon Discharge: Wheeled walker  PT Recommended Transfer Status: Assist x2  PT - OK to Discharge: Yes (Per PT poc)      Subjective   General Visit Information:  General  Reason for Referral: Impaired mobility, L 2nd rib fx  Referred By: Félix Gale  Past Medical History Relevant to Rehab: PMHx: CAD, hyperlipidemia, OA, chronic obstructive lung disease  Family/Caregiver Present: Yes (Daughter, grandson, granddaught in law)  Prior to Session Communication: Bedside nurse  Patient Position Received: Bed, 3 rail up, Alarm on  General Comment: Pt pleasant and cooperative with PT eval  Home Living:  Home Living  Type of  Home: House  Lives With: Other (Comment) (Dtr and son in law live with)  Home Adaptive Equipment: Walker rolling or standard  Home Layout: One level, Stairs to alternate level with rails  Alternate Level Stairs-Rails: Both  Alternate Level Stairs-Number of Steps: 20  Home Access: Stairs to enter with rails  Entrance Stairs-Rails: Both  Entrance Stairs-Number of Steps: 4  Bathroom Shower/Tub: Walk-in shower  Bathroom Equipment: Other (Comment) (suction grab bar)  Prior Level of Function:  Prior Function Per Pt/Caregiver Report  Level of [address]: Independent with ADLs and functional transfers, Independent with homemaking with ambulation  ADL Assistance: Independent  Homemaking Assistance: Independent  Ambulatory Assistance: Independent  Prior Function Comments: (+) driving  Precautions:  Precautions  Medical Precautions: Fall precautions (IV, 2L O2, tele)    Objective   Pain:  Pain Assessment  Pain Assessment: 0-10  0-10 (Numeric) Pain Score: 10 - Worst possible pain (During movement)  Pain Type: Acute pain  Pain Location: Rib cage  Pain Orientation: Left  Pain Interventions: Repositioned, MD notified (Comment) (Medication provided per RN)  Cognition:  Cognition  Overall Cognitive Status: Within Functional Limits  Orientation Level: Oriented X4    General Assessments:  General Observation  General Observation: Pt in significant pain during movements, heavily impairing ability tolerate upright position. Provided pt with pillow between trunk and L UE for support. Spoke with physician regarding L shoulder pain, physician will assess. (CT Head IMPRESSION:  No evidence of acute cortical infarct or intracranial hemorrhage.)    Activity Tolerance  Endurance: Tolerates less than 10 min exercise, no significant change in vital signs     Strength  Strength Comments: Not tested d/t pain  Coordination  Movements are Fluid and Coordinated: Yes     Static Sitting Balance  Static Sitting-Comment/Number of Minutes: <5 seconds  (Unable tolerate sitting due to pain)     Functional Assessments:     Bed Mobility  Bed Mobility: No  Bed Mobility 1  Bed Mobility 1: Supine to sitting, Sitting to supine  Level of Assistance 1: Maximum assistance (x2)  Bed Mobility Comments 1:  (Unable tolerate due to pain, returned to supine)    Transfers  Transfer: No (Unable to complete due to pain)     Outcome Measures:  [facility] Basic Mobility  Turning from your back to your side while in a flat bed without using bedrails: Total  Moving from lying on your back to sitting on the side of a flat bed without using bedrails: Total  Moving to and from bed to chair (including a wheelchair): Total  Standing up from a chair using your arms (e.g. wheelchair or bedside chair): Total  To walk in hospital room: Total  Climbing 3-5 steps with railing: Total  Basic Mobility - Total Score: 6    Encounter Problems       Encounter Problems (Active)       Balance       STG - Maintains static standing balance without upper extremity support for 3 minutes independently  (Progressing)       Start:  06/28/24    Expected End:  07/12/24            STG - Maintains dynamic sitting balance without upper extremity support for 5 minutes independently  (Progressing)       Start:  06/28/24    Expected End:  07/12/24               Mobility       STG - Patient will ambulate 50' with least restrictive device MOD I  (Progressing)       Start:  06/28/24    Expected End:  07/12/24            STG - Patient will ascend and descend four stairs with use of B rails CGA  (Progressing)       Start:  06/28/24    Expected End:  07/12/24               PT Transfers       STG - Patient to transfer to and from sit to supine independently  (Progressing)       Start:  06/28/24    Expected End:  07/12/24            STG - Patient will perform sit to and from stand x5 in 30 seconds without use of UE  (Progressing)       Start:  06/28/24    Expected End:  07/12/24                   Education Documentation  Precautions,  taught by ZACK Bonilla at 6/28/2024 11:20 AM.  Learner: Family, Patient  Readiness: Eager  Method: Explanation, Demonstration  Response: Verbalizes Understanding    Body Mechanics, taught by ZACK Bonilla at 6/28/2024 11:20 AM.  Learner: Family, Patient  Readiness: Eager  Method: Explanation, Demonstration  Response: Verbalizes Understanding    Mobility Training, taught by ZACK Bonilla at 6/28/2024 11:20 AM.  Learner: Family, Patient  Readiness: Eager  Method: Explanation, Demonstration  Response: Verbalizes Understanding    Education Comments  No comments found.
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
## 2024-06-28 NOTE — PROGRESS NOTES
06/28/24 1251   Discharge Planning   Living Arrangements Children   Support Systems Children   Assistance Needed Patient is A&Ox3, on room air at baseline, is independent with ADL's and uses no DME at home (but has devices if needed), drives. Patient denies further needs upon discharge   Type of Residence Private residence   Number of Stairs to Enter Residence 3   Number of Stairs Within Residence 10   Who is requesting discharge planning? Provider   Home or Post Acute Services Post acute facilities (Rehab/SNF/etc);In home services   Type of Post Acute Facility Services Skilled nursing   Type of Home Care Services Home PT;Home OT   Patient expects to be discharged to: Patient was seen and evaluated by PT/OT with recommendations for moderate intensity rehab, TCC to touch base with the patient and family to discuss PT/OT recommendations.   Does the patient need discharge transport arranged? Yes   RoundTrip coordination needed? Yes   Has discharge transport been arranged? No   Patient Choice   Provider Choice list and CMS website (https://medicare.gov/care-compare#search) for post-acute Quality and Resource Measure Data were provided and reviewed with: Family;Patient   Patient / Family choosing to utilize agency / facility established prior to hospitalization No

## 2024-06-29 LAB
ANION GAP SERPL CALC-SCNC: 13 MMOL/L (ref 10–20)
BUN SERPL-MCNC: 43 MG/DL (ref 6–23)
CALCIUM SERPL-MCNC: 8.8 MG/DL (ref 8.6–10.3)
CHLORIDE SERPL-SCNC: 100 MMOL/L (ref 98–107)
CO2 SERPL-SCNC: 26 MMOL/L (ref 21–32)
CREAT SERPL-MCNC: 1.13 MG/DL (ref 0.5–1.3)
EGFRCR SERPLBLD CKD-EPI 2021: 65 ML/MIN/1.73M*2
ERYTHROCYTE [DISTWIDTH] IN BLOOD BY AUTOMATED COUNT: 13.5 % (ref 11.5–14.5)
GLUCOSE SERPL-MCNC: 117 MG/DL (ref 74–99)
HCT VFR BLD AUTO: 38.6 % (ref 41–52)
HGB BLD-MCNC: 12.6 G/DL (ref 13.5–17.5)
MCH RBC QN AUTO: 29.6 PG (ref 26–34)
MCHC RBC AUTO-ENTMCNC: 32.6 G/DL (ref 32–36)
MCV RBC AUTO: 91 FL (ref 80–100)
NRBC BLD-RTO: 0 /100 WBCS (ref 0–0)
PLATELET # BLD AUTO: 153 X10*3/UL (ref 150–450)
POTASSIUM SERPL-SCNC: 4.4 MMOL/L (ref 3.5–5.3)
RBC # BLD AUTO: 4.25 X10*6/UL (ref 4.5–5.9)
SODIUM SERPL-SCNC: 135 MMOL/L (ref 136–145)
WBC # BLD AUTO: 10.6 X10*3/UL (ref 4.4–11.3)

## 2024-06-29 PROCEDURE — 2500000004 HC RX 250 GENERAL PHARMACY W/ HCPCS (ALT 636 FOR OP/ED): Performed by: INTERNAL MEDICINE

## 2024-06-29 PROCEDURE — 2500000001 HC RX 250 WO HCPCS SELF ADMINISTERED DRUGS (ALT 637 FOR MEDICARE OP): Performed by: INTERNAL MEDICINE

## 2024-06-29 PROCEDURE — 2500000005 HC RX 250 GENERAL PHARMACY W/O HCPCS: Performed by: NURSE PRACTITIONER

## 2024-06-29 PROCEDURE — 36415 COLL VENOUS BLD VENIPUNCTURE: CPT | Performed by: PHYSICIAN ASSISTANT

## 2024-06-29 PROCEDURE — 2500000001 HC RX 250 WO HCPCS SELF ADMINISTERED DRUGS (ALT 637 FOR MEDICARE OP): Performed by: PHYSICIAN ASSISTANT

## 2024-06-29 PROCEDURE — 2500000004 HC RX 250 GENERAL PHARMACY W/ HCPCS (ALT 636 FOR OP/ED)

## 2024-06-29 PROCEDURE — 2500000004 HC RX 250 GENERAL PHARMACY W/ HCPCS (ALT 636 FOR OP/ED): Performed by: NURSE PRACTITIONER

## 2024-06-29 PROCEDURE — 85027 COMPLETE CBC AUTOMATED: CPT | Performed by: PHYSICIAN ASSISTANT

## 2024-06-29 PROCEDURE — 96372 THER/PROPH/DIAG INJ SC/IM: CPT | Performed by: NURSE PRACTITIONER

## 2024-06-29 PROCEDURE — 2500000005 HC RX 250 GENERAL PHARMACY W/O HCPCS: Performed by: PHYSICIAN ASSISTANT

## 2024-06-29 PROCEDURE — 94760 N-INVAS EAR/PLS OXIMETRY 1: CPT

## 2024-06-29 PROCEDURE — 99232 SBSQ HOSP IP/OBS MODERATE 35: CPT | Performed by: INTERNAL MEDICINE

## 2024-06-29 PROCEDURE — 80048 BASIC METABOLIC PNL TOTAL CA: CPT | Performed by: PHYSICIAN ASSISTANT

## 2024-06-29 PROCEDURE — G0378 HOSPITAL OBSERVATION PER HR: HCPCS

## 2024-06-29 PROCEDURE — 2500000001 HC RX 250 WO HCPCS SELF ADMINISTERED DRUGS (ALT 637 FOR MEDICARE OP): Performed by: NURSE PRACTITIONER

## 2024-06-29 RX ORDER — MORPHINE SULFATE 2 MG/ML
2 INJECTION, SOLUTION INTRAMUSCULAR; INTRAVENOUS ONCE
Status: COMPLETED | OUTPATIENT
Start: 2024-06-29 | End: 2024-06-29

## 2024-06-29 RX ORDER — SODIUM CHLORIDE, SODIUM LACTATE, POTASSIUM CHLORIDE, CALCIUM CHLORIDE 600; 310; 30; 20 MG/100ML; MG/100ML; MG/100ML; MG/100ML
75 INJECTION, SOLUTION INTRAVENOUS CONTINUOUS
Status: DISCONTINUED | OUTPATIENT
Start: 2024-06-29 | End: 2024-06-30

## 2024-06-29 RX ORDER — TRAMADOL HYDROCHLORIDE 50 MG/1
50 TABLET ORAL EVERY EVENING
Status: ACTIVE | OUTPATIENT
Start: 2024-06-29

## 2024-06-29 RX ADMIN — TRAMADOL HYDROCHLORIDE 50 MG: 50 TABLET, COATED ORAL at 19:55

## 2024-06-29 RX ADMIN — ACETAMINOPHEN 650 MG: 325 TABLET ORAL at 15:55

## 2024-06-29 RX ADMIN — HYDROMORPHONE HYDROCHLORIDE 0.5 MG: 1 INJECTION, SOLUTION INTRAMUSCULAR; INTRAVENOUS; SUBCUTANEOUS at 20:16

## 2024-06-29 RX ADMIN — TRAMADOL HYDROCHLORIDE 50 MG: 50 TABLET, COATED ORAL at 03:39

## 2024-06-29 RX ADMIN — ACETAMINOPHEN 650 MG: 325 TABLET ORAL at 03:39

## 2024-06-29 RX ADMIN — TRAMADOL HYDROCHLORIDE 50 MG: 50 TABLET, COATED ORAL at 11:00

## 2024-06-29 RX ADMIN — ACEBUTOLOL HYDROCHLORIDE 200 MG: 200 CAPSULE ORAL at 10:00

## 2024-06-29 RX ADMIN — ACETAMINOPHEN 650 MG: 325 TABLET ORAL at 10:00

## 2024-06-29 RX ADMIN — CYCLOBENZAPRINE HYDROCHLORIDE 5 MG: 5 TABLET, FILM COATED ORAL at 10:00

## 2024-06-29 RX ADMIN — CYCLOBENZAPRINE HYDROCHLORIDE 5 MG: 5 TABLET, FILM COATED ORAL at 21:17

## 2024-06-29 RX ADMIN — ENOXAPARIN SODIUM 40 MG: 40 INJECTION SUBCUTANEOUS at 10:00

## 2024-06-29 RX ADMIN — CYCLOBENZAPRINE HYDROCHLORIDE 5 MG: 5 TABLET, FILM COATED ORAL at 15:57

## 2024-06-29 RX ADMIN — ACEBUTOLOL HYDROCHLORIDE 200 MG: 200 CAPSULE ORAL at 20:10

## 2024-06-29 RX ADMIN — Medication 2 L/MIN: at 21:00

## 2024-06-29 RX ADMIN — MORPHINE SULFATE 2 MG: 2 INJECTION, SOLUTION INTRAMUSCULAR; INTRAVENOUS at 22:10

## 2024-06-29 RX ADMIN — MENTHOL 10%, METHYL SALICYLATE 15% 1 APPLICATION: 10; 15 CREAM TOPICAL at 22:39

## 2024-06-29 RX ADMIN — Medication 2 L/MIN: at 10:18

## 2024-06-29 RX ADMIN — Medication 2 L/MIN: at 19:44

## 2024-06-29 RX ADMIN — SODIUM CHLORIDE, POTASSIUM CHLORIDE, SODIUM LACTATE AND CALCIUM CHLORIDE 75 ML/HR: 600; 310; 30; 20 INJECTION, SOLUTION INTRAVENOUS at 19:55

## 2024-06-29 RX ADMIN — ACETAMINOPHEN 650 MG: 325 TABLET ORAL at 21:17

## 2024-06-29 RX ADMIN — ASPIRIN 81 MG: 81 TABLET, COATED ORAL at 10:00

## 2024-06-29 RX ADMIN — LIDOCAINE 4% 1 PATCH: 40 PATCH TOPICAL at 10:01

## 2024-06-29 RX ADMIN — DOCUSATE SODIUM 100 MG: 100 CAPSULE, LIQUID FILLED ORAL at 20:11

## 2024-06-29 ASSESSMENT — COGNITIVE AND FUNCTIONAL STATUS - GENERAL
TOILETING: A LITTLE
WALKING IN HOSPITAL ROOM: A LITTLE
HELP NEEDED FOR BATHING: A LOT
DAILY ACTIVITIY SCORE: 15
MOVING FROM LYING ON BACK TO SITTING ON SIDE OF FLAT BED WITH BEDRAILS: A LOT
TURNING FROM BACK TO SIDE WHILE IN FLAT BAD: A LOT
MOBILITY SCORE: 13
DRESSING REGULAR UPPER BODY CLOTHING: A LOT
STANDING UP FROM CHAIR USING ARMS: A LOT
CLIMB 3 TO 5 STEPS WITH RAILING: A LOT
MOVING TO AND FROM BED TO CHAIR: A LOT
EATING MEALS: A LITTLE
DRESSING REGULAR LOWER BODY CLOTHING: A LOT
CLIMB 3 TO 5 STEPS WITH RAILING: A LOT
STANDING UP FROM CHAIR USING ARMS: A LOT
EATING MEALS: A LITTLE
DRESSING REGULAR UPPER BODY CLOTHING: A LOT
MOVING FROM LYING ON BACK TO SITTING ON SIDE OF FLAT BED WITH BEDRAILS: A LOT
PERSONAL GROOMING: A LITTLE
WALKING IN HOSPITAL ROOM: A LITTLE
TOILETING: A LITTLE
PERSONAL GROOMING: A LITTLE
MOBILITY SCORE: 13
DAILY ACTIVITIY SCORE: 15
MOVING TO AND FROM BED TO CHAIR: A LOT
HELP NEEDED FOR BATHING: A LOT
TURNING FROM BACK TO SIDE WHILE IN FLAT BAD: A LOT
DRESSING REGULAR LOWER BODY CLOTHING: A LOT

## 2024-06-29 ASSESSMENT — PAIN DESCRIPTION - ORIENTATION
ORIENTATION: LEFT
ORIENTATION: LEFT

## 2024-06-29 ASSESSMENT — PAIN SCALES - GENERAL
PAINLEVEL_OUTOF10: 3
PAINLEVEL_OUTOF10: 7
PAINLEVEL_OUTOF10: 3
PAINLEVEL_OUTOF10: 7
PAINLEVEL_OUTOF10: 10 - WORST POSSIBLE PAIN

## 2024-06-29 ASSESSMENT — PAIN DESCRIPTION - LOCATION
LOCATION: RIB CAGE
LOCATION: CHEST

## 2024-06-29 ASSESSMENT — PAIN - FUNCTIONAL ASSESSMENT
PAIN_FUNCTIONAL_ASSESSMENT: 0-10

## 2024-06-29 NOTE — CARE PLAN
Problem: Skin  Goal: Prevent/minimize sheer/friction injuries  Outcome: Progressing   The patient's goals for the shift include      The clinical goals for the shift include pt will maintain pain under control throughout shift
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
## 2024-06-30 ENCOUNTER — APPOINTMENT (OUTPATIENT)
Dept: CARDIOLOGY | Facility: HOSPITAL | Age: 84
End: 2024-06-30
Payer: MEDICARE

## 2024-06-30 ENCOUNTER — HOSPITAL ENCOUNTER (OUTPATIENT)
Dept: CARDIOLOGY | Facility: HOSPITAL | Age: 84
Discharge: HOME | End: 2024-06-30
Payer: MEDICARE

## 2024-06-30 ENCOUNTER — APPOINTMENT (OUTPATIENT)
Dept: RADIOLOGY | Facility: HOSPITAL | Age: 84
End: 2024-06-30
Payer: MEDICARE

## 2024-06-30 VITALS
BODY MASS INDEX: 33.74 KG/M2 | HEIGHT: 67 IN | TEMPERATURE: 97.2 F | HEART RATE: 60 BPM | RESPIRATION RATE: 18 BRPM | DIASTOLIC BLOOD PRESSURE: 78 MMHG | SYSTOLIC BLOOD PRESSURE: 136 MMHG | WEIGHT: 215 LBS | OXYGEN SATURATION: 96 %

## 2024-06-30 PROBLEM — S22.32XA CLOSED FRACTURE OF ONE RIB OF LEFT SIDE, INITIAL ENCOUNTER: Status: ACTIVE | Noted: 2024-06-30

## 2024-06-30 LAB
ANION GAP SERPL CALC-SCNC: 17 MMOL/L (ref 10–20)
BUN SERPL-MCNC: 30 MG/DL (ref 6–23)
CALCIUM SERPL-MCNC: 8.7 MG/DL (ref 8.6–10.3)
CHLORIDE SERPL-SCNC: 98 MMOL/L (ref 98–107)
CO2 SERPL-SCNC: 25 MMOL/L (ref 21–32)
CREAT SERPL-MCNC: 0.93 MG/DL (ref 0.5–1.3)
EGFRCR SERPLBLD CKD-EPI 2021: 82 ML/MIN/1.73M*2
ERYTHROCYTE [DISTWIDTH] IN BLOOD BY AUTOMATED COUNT: 13 % (ref 11.5–14.5)
GLUCOSE SERPL-MCNC: 118 MG/DL (ref 74–99)
HCT VFR BLD AUTO: 38.9 % (ref 41–52)
HGB BLD-MCNC: 12.7 G/DL (ref 13.5–17.5)
MCH RBC QN AUTO: 29.5 PG (ref 26–34)
MCHC RBC AUTO-ENTMCNC: 32.6 G/DL (ref 32–36)
MCV RBC AUTO: 90 FL (ref 80–100)
NRBC BLD-RTO: 0 /100 WBCS (ref 0–0)
PLATELET # BLD AUTO: 154 X10*3/UL (ref 150–450)
POTASSIUM SERPL-SCNC: 4.6 MMOL/L (ref 3.5–5.3)
RBC # BLD AUTO: 4.31 X10*6/UL (ref 4.5–5.9)
SODIUM SERPL-SCNC: 135 MMOL/L (ref 136–145)
WBC # BLD AUTO: 9.7 X10*3/UL (ref 4.4–11.3)

## 2024-06-30 PROCEDURE — 71045 X-RAY EXAM CHEST 1 VIEW: CPT

## 2024-06-30 PROCEDURE — 93005 ELECTROCARDIOGRAM TRACING: CPT

## 2024-06-30 PROCEDURE — 2500000001 HC RX 250 WO HCPCS SELF ADMINISTERED DRUGS (ALT 637 FOR MEDICARE OP): Performed by: NURSE PRACTITIONER

## 2024-06-30 PROCEDURE — 2500000004 HC RX 250 GENERAL PHARMACY W/ HCPCS (ALT 636 FOR OP/ED): Performed by: NURSE PRACTITIONER

## 2024-06-30 PROCEDURE — 1200000002 HC GENERAL ROOM WITH TELEMETRY DAILY

## 2024-06-30 PROCEDURE — 2500000005 HC RX 250 GENERAL PHARMACY W/O HCPCS: Performed by: PHYSICIAN ASSISTANT

## 2024-06-30 PROCEDURE — 94668 MNPJ CHEST WALL SBSQ: CPT

## 2024-06-30 PROCEDURE — 2500000001 HC RX 250 WO HCPCS SELF ADMINISTERED DRUGS (ALT 637 FOR MEDICARE OP): Performed by: PHYSICIAN ASSISTANT

## 2024-06-30 PROCEDURE — 80048 BASIC METABOLIC PNL TOTAL CA: CPT | Performed by: PHYSICIAN ASSISTANT

## 2024-06-30 PROCEDURE — 96372 THER/PROPH/DIAG INJ SC/IM: CPT | Performed by: NURSE PRACTITIONER

## 2024-06-30 PROCEDURE — 71045 X-RAY EXAM CHEST 1 VIEW: CPT | Performed by: RADIOLOGY

## 2024-06-30 PROCEDURE — 94760 N-INVAS EAR/PLS OXIMETRY 1: CPT

## 2024-06-30 PROCEDURE — 97530 THERAPEUTIC ACTIVITIES: CPT | Mod: CQ,GP

## 2024-06-30 PROCEDURE — 2500000005 HC RX 250 GENERAL PHARMACY W/O HCPCS: Performed by: NURSE PRACTITIONER

## 2024-06-30 PROCEDURE — 93010 ELECTROCARDIOGRAM REPORT: CPT | Performed by: INTERNAL MEDICINE

## 2024-06-30 PROCEDURE — 36415 COLL VENOUS BLD VENIPUNCTURE: CPT | Performed by: PHYSICIAN ASSISTANT

## 2024-06-30 PROCEDURE — 85027 COMPLETE CBC AUTOMATED: CPT | Performed by: PHYSICIAN ASSISTANT

## 2024-06-30 PROCEDURE — 2500000001 HC RX 250 WO HCPCS SELF ADMINISTERED DRUGS (ALT 637 FOR MEDICARE OP): Performed by: INTERNAL MEDICINE

## 2024-06-30 PROCEDURE — 94640 AIRWAY INHALATION TREATMENT: CPT

## 2024-06-30 PROCEDURE — 2500000002 HC RX 250 W HCPCS SELF ADMINISTERED DRUGS (ALT 637 FOR MEDICARE OP, ALT 636 FOR OP/ED): Performed by: INTERNAL MEDICINE

## 2024-06-30 PROCEDURE — 99232 SBSQ HOSP IP/OBS MODERATE 35: CPT | Performed by: INTERNAL MEDICINE

## 2024-06-30 RX ORDER — MORPHINE SULFATE 2 MG/ML
2 INJECTION, SOLUTION INTRAMUSCULAR; INTRAVENOUS ONCE
Status: COMPLETED | OUTPATIENT
Start: 2024-06-30 | End: 2024-06-30

## 2024-06-30 RX ORDER — IPRATROPIUM BROMIDE AND ALBUTEROL SULFATE 2.5; .5 MG/3ML; MG/3ML
3 SOLUTION RESPIRATORY (INHALATION) 3 TIMES DAILY
Status: DISPENSED | OUTPATIENT
Start: 2024-06-30

## 2024-06-30 RX ORDER — SENNOSIDES 8.6 MG/1
1 TABLET ORAL NIGHTLY
Status: DISCONTINUED | OUTPATIENT
Start: 2024-06-30 | End: 2024-06-30

## 2024-06-30 RX ORDER — IPRATROPIUM BROMIDE AND ALBUTEROL SULFATE 2.5; .5 MG/3ML; MG/3ML
3 SOLUTION RESPIRATORY (INHALATION) EVERY 2 HOUR PRN
Status: ACTIVE | OUTPATIENT
Start: 2024-06-30

## 2024-06-30 RX ORDER — IPRATROPIUM BROMIDE AND ALBUTEROL SULFATE 2.5; .5 MG/3ML; MG/3ML
3 SOLUTION RESPIRATORY (INHALATION)
Status: DISCONTINUED | OUTPATIENT
Start: 2024-06-30 | End: 2024-06-30

## 2024-06-30 RX ORDER — IPRATROPIUM BROMIDE AND ALBUTEROL SULFATE 2.5; .5 MG/3ML; MG/3ML
3 SOLUTION RESPIRATORY (INHALATION) EVERY 2 HOUR PRN
Status: DISCONTINUED | OUTPATIENT
Start: 2024-06-30 | End: 2024-06-30

## 2024-06-30 RX ORDER — SENNOSIDES 8.6 MG/1
2 TABLET ORAL NIGHTLY
Status: DISPENSED | OUTPATIENT
Start: 2024-06-30

## 2024-06-30 RX ADMIN — TRAMADOL HYDROCHLORIDE 50 MG: 50 TABLET, COATED ORAL at 14:30

## 2024-06-30 RX ADMIN — Medication 2 L/MIN: at 08:59

## 2024-06-30 RX ADMIN — MENTHOL 10%, METHYL SALICYLATE 15% 1 APPLICATION: 10; 15 CREAM TOPICAL at 05:03

## 2024-06-30 RX ADMIN — TRAMADOL HYDROCHLORIDE 50 MG: 50 TABLET, COATED ORAL at 02:03

## 2024-06-30 RX ADMIN — Medication 2 L/MIN: at 21:03

## 2024-06-30 RX ADMIN — IPRATROPIUM BROMIDE AND ALBUTEROL SULFATE 3 ML: 2.5; .5 SOLUTION RESPIRATORY (INHALATION) at 14:11

## 2024-06-30 RX ADMIN — ACEBUTOLOL HYDROCHLORIDE 200 MG: 200 CAPSULE ORAL at 20:58

## 2024-06-30 RX ADMIN — ACETAMINOPHEN 650 MG: 325 TABLET ORAL at 10:56

## 2024-06-30 RX ADMIN — ACETAMINOPHEN 650 MG: 325 TABLET ORAL at 04:28

## 2024-06-30 RX ADMIN — LISINOPRIL 20 MG: 20 TABLET ORAL at 08:46

## 2024-06-30 RX ADMIN — ACETAMINOPHEN 650 MG: 325 TABLET ORAL at 16:11

## 2024-06-30 RX ADMIN — DOCUSATE SODIUM 100 MG: 100 CAPSULE, LIQUID FILLED ORAL at 20:58

## 2024-06-30 RX ADMIN — LIDOCAINE 4% 1 PATCH: 40 PATCH TOPICAL at 08:44

## 2024-06-30 RX ADMIN — MENTHOL 10%, METHYL SALICYLATE 15% 1 APPLICATION: 10; 15 CREAM TOPICAL at 22:17

## 2024-06-30 RX ADMIN — ACEBUTOLOL HYDROCHLORIDE 200 MG: 200 CAPSULE ORAL at 08:46

## 2024-06-30 RX ADMIN — ASPIRIN 81 MG: 81 TABLET, COATED ORAL at 08:47

## 2024-06-30 RX ADMIN — IPRATROPIUM BROMIDE AND ALBUTEROL SULFATE 3 ML: 2.5; .5 SOLUTION RESPIRATORY (INHALATION) at 21:03

## 2024-06-30 RX ADMIN — TRAMADOL HYDROCHLORIDE 50 MG: 50 TABLET, COATED ORAL at 08:44

## 2024-06-30 RX ADMIN — TRAMADOL HYDROCHLORIDE 50 MG: 50 TABLET, COATED ORAL at 20:59

## 2024-06-30 RX ADMIN — ENOXAPARIN SODIUM 40 MG: 40 INJECTION SUBCUTANEOUS at 08:44

## 2024-06-30 RX ADMIN — HYDROCHLOROTHIAZIDE 12.5 MG: 25 TABLET ORAL at 08:45

## 2024-06-30 RX ADMIN — Medication 2 L/MIN: at 14:11

## 2024-06-30 RX ADMIN — MORPHINE SULFATE 2 MG: 2 INJECTION, SOLUTION INTRAMUSCULAR; INTRAVENOUS at 04:55

## 2024-06-30 RX ADMIN — ACETAMINOPHEN 650 MG: 325 TABLET ORAL at 22:17

## 2024-06-30 RX ADMIN — CYCLOBENZAPRINE HYDROCHLORIDE 5 MG: 5 TABLET, FILM COATED ORAL at 16:16

## 2024-06-30 RX ADMIN — SENNOSIDES 17.2 MG: 8.6 TABLET, FILM COATED ORAL at 20:58

## 2024-06-30 ASSESSMENT — PAIN DESCRIPTION - ORIENTATION: ORIENTATION: LEFT

## 2024-06-30 ASSESSMENT — COGNITIVE AND FUNCTIONAL STATUS - GENERAL
WALKING IN HOSPITAL ROOM: A LITTLE
PERSONAL GROOMING: A LITTLE
STANDING UP FROM CHAIR USING ARMS: A LOT
TURNING FROM BACK TO SIDE WHILE IN FLAT BAD: A LOT
HELP NEEDED FOR BATHING: A LITTLE
CLIMB 3 TO 5 STEPS WITH RAILING: A LOT
MOBILITY SCORE: 13
EATING MEALS: A LITTLE
TURNING FROM BACK TO SIDE WHILE IN FLAT BAD: A LITTLE
STANDING UP FROM CHAIR USING ARMS: A LITTLE
MOBILITY SCORE: 13
MOVING FROM LYING ON BACK TO SITTING ON SIDE OF FLAT BED WITH BEDRAILS: A LITTLE
STANDING UP FROM CHAIR USING ARMS: A LOT
EATING MEALS: A LITTLE
CLIMB 3 TO 5 STEPS WITH RAILING: A LOT
MOVING TO AND FROM BED TO CHAIR: A LOT
TURNING FROM BACK TO SIDE WHILE IN FLAT BAD: A LOT
HELP NEEDED FOR BATHING: A LOT
DRESSING REGULAR UPPER BODY CLOTHING: A LOT
DAILY ACTIVITIY SCORE: 15
MOVING FROM LYING ON BACK TO SITTING ON SIDE OF FLAT BED WITH BEDRAILS: A LOT
MOVING TO AND FROM BED TO CHAIR: A LOT
HELP NEEDED FOR BATHING: A LOT
DRESSING REGULAR LOWER BODY CLOTHING: A LOT
MOVING TO AND FROM BED TO CHAIR: A LOT
MOVING TO AND FROM BED TO CHAIR: A LITTLE
WALKING IN HOSPITAL ROOM: A LITTLE
MOBILITY SCORE: 16
WALKING IN HOSPITAL ROOM: A LITTLE
CLIMB 3 TO 5 STEPS WITH RAILING: A LOT
TURNING FROM BACK TO SIDE WHILE IN FLAT BAD: A LOT
TOILETING: A LITTLE
WALKING IN HOSPITAL ROOM: A LOT
PERSONAL GROOMING: A LITTLE
MOVING FROM LYING ON BACK TO SITTING ON SIDE OF FLAT BED WITH BEDRAILS: A LOT
DAILY ACTIVITIY SCORE: 15
DAILY ACTIVITIY SCORE: 17
MOVING FROM LYING ON BACK TO SITTING ON SIDE OF FLAT BED WITH BEDRAILS: A LITTLE
DRESSING REGULAR UPPER BODY CLOTHING: A LOT
TOILETING: A LOT
DRESSING REGULAR LOWER BODY CLOTHING: A LOT
DRESSING REGULAR UPPER BODY CLOTHING: A LOT
CLIMB 3 TO 5 STEPS WITH RAILING: A LOT
MOBILITY SCORE: 14
DRESSING REGULAR LOWER BODY CLOTHING: A LITTLE
PERSONAL GROOMING: A LITTLE
TOILETING: A LITTLE
STANDING UP FROM CHAIR USING ARMS: A LOT

## 2024-06-30 ASSESSMENT — PAIN SCALES - GENERAL
PAINLEVEL_OUTOF10: 7
PAINLEVEL_OUTOF10: 4
PAINLEVEL_OUTOF10: 5 - MODERATE PAIN
PAINLEVEL_OUTOF10: 7
PAINLEVEL_OUTOF10: 7
PAINLEVEL_OUTOF10: 6
PAINLEVEL_OUTOF10: 7
PAINLEVEL_OUTOF10: 1
PAINLEVEL_OUTOF10: 0 - NO PAIN

## 2024-06-30 ASSESSMENT — PAIN - FUNCTIONAL ASSESSMENT
PAIN_FUNCTIONAL_ASSESSMENT: 0-10

## 2024-06-30 ASSESSMENT — PAIN DESCRIPTION - LOCATION: LOCATION: OTHER (COMMENT)

## 2024-06-30 NOTE — PROGRESS NOTES
Physical Therapy    Physical Therapy Treatment    Patient Name: Jayson Elizalde  MRN: [id number]  Today's Date: 6/30/2024  Time Calculation  Start Time: 1215  Stop Time: 1232  Time Calculation (min): 17 min    Assessment/Plan   PT Assessment  PT Assessment Results: Decreased mobility, Decreased endurance, Pain  Rehab Prognosis: Good  Barriers to Discharge: Significant pain  Evaluation/Treatment Tolerance: Patient limited by pain  Medical Staff Made Aware: Yes  Strengths: Ability to acquire knowledge, Support of Caregivers, Premorbid level of function  Barriers to Participation: Comorbidities  End of Session Communication: Bedside nurse  Assessment Comment: Patient tolerated treamtent well. He requires increased time to complete all tasks secondary to pain and fear of increasing pain. Patient pain levels have decreased with ambulation and sitting EOB. Patient notes pain increases with positional movements. Continue recommend mod intensity therapy to return to PLOF.  End of Session Patient Position: Alarm off, caregiver present (Patient sitting EOB with caregiver and family in room.)     PT Plan  Treatment/Interventions: Transfer training, Balance training, Neuromuscular re-education, Strengthening, Range of motion, Endurance training  PT Plan: Ongoing PT  PT Frequency: 3 times per week  PT Discharge Recommendations: Moderate intensity level of continued care  Equipment Recommended upon Discharge: Wheeled walker  PT Recommended Transfer Status: Assist x2  PT - OK to Discharge: Yes (Per PT POC)      General Visit Information:   PT  Visit  PT Received On: 06/30/24  General  Reason for Referral: Impaired mobility, L 2nd rib fx  Referred By: Félix Gale  Past Medical History Relevant to Rehab: PMHx: CAD, hyperlipidemia, OA, chronic obstructive lung disease  Family/Caregiver Present: Yes (daughter and son in law present)  Prior to Session Communication: Bedside nurse  Patient Position Received: Alarm off, caregiver present  "(Seated EOB)  General Comment: Patient pleasant and agreeable to therapy treatment '    Subjective   Precautions:  Precautions  Medical Precautions: Fall precautions (IV, 2L O2, tele)  Vital Signs:       Objective   Pain:  Pain Assessment  Pain Assessment: 0-10  0-10 (Numeric) Pain Score: 7 (Patient states \"a tolerable 7/10 pain\" when sitting EOB, standing, and ambulating. but with positional movements patient notes increased pain closer to 10/10.)  Pain Interventions: Ambulation/increased activity  Cognition:  Cognition  Orientation Level: Oriented X4  Coordination:     Postural Control:  Static Sitting Balance  Static Sitting-Balance Support: No upper extremity supported, Feet supported  Static Sitting-Level of Assistance: Close supervision  Static Standing Balance  Static Standing-Balance Support: Bilateral upper extremity supported  Static Standing-Level of Assistance: Minimum assistance  Dynamic Standing Balance  Dynamic Standing-Balance Support: Bilateral upper extremity supported  Dynamic Standing-Balance: Lateral lean    Activity Tolerance:  Activity Tolerance  Endurance: Tolerates less than 10 min exercise, no significant change in vital signs  Treatments:                                Bed Mobility  Bed Mobility: No    Ambulation/Gait Training  Ambulation/Gait Training Performed: Yes  Ambulation/Gait Training 1  Surface 1: Level tile  Device 1: Rolling walker  Assistance 1: Minimum assistance, Moderate verbal cues  Quality of Gait 1: Diminished heel strike, Decreased step length, Shuffling gait  Comments/Distance (ft) 1: 5ftx3 lateral steps to and from HOB. Patient requires verbal cues for technique, proper breathing, and posture improvement. Patient requires extended time to complete tasks secondary to fear of creating pain.  Transfers  Transfer: Yes  Transfer 1  Transfer From 1: Sit to, Stand to  Transfer to 1: Stand, Sit  Technique 1: Sit to stand, Stand to sit  Transfer Device 1: Walker  Transfer Level " of Assistance 1: Minimum assistance, Moderate verbal cues (x2)  Trials/Comments 1: Patient requires increased time to complete task due to fear of increased pain. Verbal cues provided on BUE placement, technique, and safety mechanics. Bed elevated to ease transition from sit. Patient demos carryover improvements with safety mechanics.                          Outcome Measures:  [facility] Basic Mobility  Turning from your back to your side while in a flat bed without using bedrails: A little  Moving from lying on your back to sitting on the side of a flat bed without using bedrails: A lot  Moving to and from bed to chair (including a wheelchair): A little  Standing up from a chair using your arms (e.g. wheelchair or bedside chair): A little  To walk in hospital room: A little  Climbing 3-5 steps with railing: A lot  Basic Mobility - Total Score: 16    Education Documentation  Precautions, taught by Shelly Wang PTA at 6/30/2024  1:04 PM.  Learner: Family, Patient  Readiness: Acceptance  Method: Explanation, Demonstration  Response: Verbalizes Understanding, Needs Reinforcement    Body Mechanics, taught by Shelly Wang PTA at 6/30/2024  1:04 PM.  Learner: Family, Patient  Readiness: Acceptance  Method: Explanation, Demonstration  Response: Verbalizes Understanding, Needs Reinforcement    Mobility Training, taught by Shelly Wang PTA at 6/30/2024  1:04 PM.  Learner: Family, Patient  Readiness: Acceptance  Method: Explanation, Demonstration  Response: Verbalizes Understanding, Needs Reinforcement    Education Comments  No comments found.        OP EDUCATION:       Encounter Problems       Encounter Problems (Active)       Balance       STG - Maintains static standing balance without upper extremity support for 3 minutes independently  (Progressing)       Start:  06/28/24    Expected End:  07/12/24            STG - Maintains dynamic sitting balance without upper extremity support for 5 minutes independently  (Progressing)        Start:  06/28/24    Expected End:  07/12/24               Mobility       STG - Patient will ambulate 50' with least restrictive device MOD I  (Progressing)       Start:  06/28/24    Expected End:  07/12/24            STG - Patient will ascend and descend four stairs with use of B rails CGA  (Not Progressing)       Start:  06/28/24    Expected End:  07/12/24               PT Transfers       STG - Patient to transfer to and from sit to supine independently  (Progressing)       Start:  06/28/24    Expected End:  07/12/24            STG - Patient will perform sit to and from stand x5 in 30 seconds without use of UE  (Progressing)       Start:  06/28/24    Expected End:  07/12/24

## 2024-06-30 NOTE — PROGRESS NOTES
06/30/24 1456   Discharge Planning   Patient expects to be discharged to: Patient and daughter agreeable to SNF, list provided and [facility]. Referrals being sent. Will need direct precert started Monday Am. ADOD 1-2 days. Please call and update Daughter Lisbet Monday am of acceptance.   Does the patient need discharge transport arranged? Yes   RoundTrip coordination needed? Yes   Has discharge transport been arranged? Yes   Patient Choice   Provider Choice list and CMS website (https://medicare.gov/care-compare#search) for post-acute Quality and Resource Measure Data were provided and reviewed with: Family;Patient   Patient / Family choosing to utilize agency / facility established prior to hospitalization No

## 2024-06-30 NOTE — CARE PLAN
Problem: Pain  Goal: Takes deep breaths with improved pain control throughout the shift  Outcome: Progressing  Goal: Free from opioid side effects throughout the shift  Outcome: Progressing   The patient's goals for the shift include      The clinical goals for the shift include pt will maintain pain under control throughout shift

## 2024-06-30 NOTE — SIGNIFICANT EVENT
Called to assess pt and says he has been in constant 10/10 LCW MSK pain for 3 days without relief. He is agitated and multiple family members at bedside.     Physical Exam   Constitutional: A&O x 3.  Eyes: EOM's intact  HEENT: Normocephalic, Atraumatic. Oral mucosa moist.   Neck: Supple. No JVD, + use of accessory muscles.   Lungs: CTAB; diminished in the bases bilaterally. Respirations even and unlabored on 2 liters.    Heart: RRR  Psych: Normal affect. agitated/argumentative.    Intractable MSK Pain  Dilaudid 0.5 mg IV once, flexeril and tylenol once, lambert mcpherson, ice and 2 mg morphine trialed once.  Continue to monitor and txt  Educated pt and family on side effects to narcotics and pain meds and need to space out medications to prevent respiratory failure to maintain pt safety. Trauma rec against the use of opioids as much as possible and remains on tramadol and tylenol with muscle relaxers.    Will give another 2 mg morphine for repeat episode of pain. Added EKG and no ST deviations and chest xray to r/o alternate cause. F/U regarding chest xray.
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
## 2024-07-01 LAB
ANION GAP SERPL CALC-SCNC: 12 MMOL/L (ref 10–20)
BUN SERPL-MCNC: 23 MG/DL (ref 6–23)
CALCIUM SERPL-MCNC: 8.9 MG/DL (ref 8.6–10.3)
CHLORIDE SERPL-SCNC: 98 MMOL/L (ref 98–107)
CO2 SERPL-SCNC: 28 MMOL/L (ref 21–32)
CREAT SERPL-MCNC: 0.8 MG/DL (ref 0.5–1.3)
EGFRCR SERPLBLD CKD-EPI 2021: 88 ML/MIN/1.73M*2
ERYTHROCYTE [DISTWIDTH] IN BLOOD BY AUTOMATED COUNT: 13.1 % (ref 11.5–14.5)
GLUCOSE SERPL-MCNC: 108 MG/DL (ref 74–99)
HCT VFR BLD AUTO: 38.5 % (ref 41–52)
HGB BLD-MCNC: 12.6 G/DL (ref 13.5–17.5)
MCH RBC QN AUTO: 29.8 PG (ref 26–34)
MCHC RBC AUTO-ENTMCNC: 32.7 G/DL (ref 32–36)
MCV RBC AUTO: 91 FL (ref 80–100)
NRBC BLD-RTO: 0 /100 WBCS (ref 0–0)
PLATELET # BLD AUTO: 155 X10*3/UL (ref 150–450)
POTASSIUM SERPL-SCNC: 3.9 MMOL/L (ref 3.5–5.3)
RBC # BLD AUTO: 4.23 X10*6/UL (ref 4.5–5.9)
SODIUM SERPL-SCNC: 134 MMOL/L (ref 136–145)
WBC # BLD AUTO: 6.6 X10*3/UL (ref 4.4–11.3)

## 2024-07-01 PROCEDURE — 2500000001 HC RX 250 WO HCPCS SELF ADMINISTERED DRUGS (ALT 637 FOR MEDICARE OP): Performed by: PHYSICIAN ASSISTANT

## 2024-07-01 PROCEDURE — 2500000001 HC RX 250 WO HCPCS SELF ADMINISTERED DRUGS (ALT 637 FOR MEDICARE OP): Performed by: NURSE PRACTITIONER

## 2024-07-01 PROCEDURE — 2500000002 HC RX 250 W HCPCS SELF ADMINISTERED DRUGS (ALT 637 FOR MEDICARE OP, ALT 636 FOR OP/ED): Performed by: INTERNAL MEDICINE

## 2024-07-01 PROCEDURE — 94640 AIRWAY INHALATION TREATMENT: CPT

## 2024-07-01 PROCEDURE — 85027 COMPLETE CBC AUTOMATED: CPT | Performed by: NURSE PRACTITIONER

## 2024-07-01 PROCEDURE — 97535 SELF CARE MNGMENT TRAINING: CPT | Mod: GO,CO

## 2024-07-01 PROCEDURE — 97530 THERAPEUTIC ACTIVITIES: CPT | Mod: GO,CO

## 2024-07-01 PROCEDURE — 2500000005 HC RX 250 GENERAL PHARMACY W/O HCPCS: Performed by: NURSE PRACTITIONER

## 2024-07-01 PROCEDURE — 2500000001 HC RX 250 WO HCPCS SELF ADMINISTERED DRUGS (ALT 637 FOR MEDICARE OP): Performed by: INTERNAL MEDICINE

## 2024-07-01 PROCEDURE — 97530 THERAPEUTIC ACTIVITIES: CPT | Mod: CQ,GP

## 2024-07-01 PROCEDURE — 94760 N-INVAS EAR/PLS OXIMETRY 1: CPT

## 2024-07-01 PROCEDURE — 94664 DEMO&/EVAL PT USE INHALER: CPT

## 2024-07-01 PROCEDURE — 97116 GAIT TRAINING THERAPY: CPT | Mod: CQ,GP

## 2024-07-01 PROCEDURE — 80048 BASIC METABOLIC PNL TOTAL CA: CPT | Performed by: NURSE PRACTITIONER

## 2024-07-01 PROCEDURE — 82374 ASSAY BLOOD CARBON DIOXIDE: CPT | Performed by: NURSE PRACTITIONER

## 2024-07-01 PROCEDURE — 2500000004 HC RX 250 GENERAL PHARMACY W/ HCPCS (ALT 636 FOR OP/ED): Performed by: NURSE PRACTITIONER

## 2024-07-01 PROCEDURE — 2500000002 HC RX 250 W HCPCS SELF ADMINISTERED DRUGS (ALT 637 FOR MEDICARE OP, ALT 636 FOR OP/ED): Performed by: PHYSICIAN ASSISTANT

## 2024-07-01 PROCEDURE — 36415 COLL VENOUS BLD VENIPUNCTURE: CPT | Performed by: NURSE PRACTITIONER

## 2024-07-01 PROCEDURE — 2500000005 HC RX 250 GENERAL PHARMACY W/O HCPCS: Performed by: PHYSICIAN ASSISTANT

## 2024-07-01 PROCEDURE — 1200000002 HC GENERAL ROOM WITH TELEMETRY DAILY

## 2024-07-01 RX ORDER — POLYETHYLENE GLYCOL 3350 17 G/17G
17 POWDER, FOR SOLUTION ORAL DAILY
Status: DISCONTINUED | OUTPATIENT
Start: 2024-07-01 | End: 2024-07-03 | Stop reason: HOSPADM

## 2024-07-01 RX ADMIN — Medication 2 L/MIN: at 07:38

## 2024-07-01 RX ADMIN — ACETAMINOPHEN 650 MG: 325 TABLET ORAL at 04:14

## 2024-07-01 RX ADMIN — ACETAMINOPHEN 650 MG: 325 TABLET ORAL at 09:36

## 2024-07-01 RX ADMIN — ROSUVASTATIN CALCIUM 10 MG: 10 TABLET, FILM COATED ORAL at 08:19

## 2024-07-01 RX ADMIN — DOCUSATE SODIUM 100 MG: 100 CAPSULE, LIQUID FILLED ORAL at 08:19

## 2024-07-01 RX ADMIN — CYCLOBENZAPRINE HYDROCHLORIDE 5 MG: 5 TABLET, FILM COATED ORAL at 09:35

## 2024-07-01 RX ADMIN — DOCUSATE SODIUM 100 MG: 100 CAPSULE, LIQUID FILLED ORAL at 20:25

## 2024-07-01 RX ADMIN — TRAMADOL HYDROCHLORIDE 50 MG: 50 TABLET, COATED ORAL at 06:04

## 2024-07-01 RX ADMIN — ACEBUTOLOL HYDROCHLORIDE 200 MG: 200 CAPSULE ORAL at 20:25

## 2024-07-01 RX ADMIN — ACEBUTOLOL HYDROCHLORIDE 200 MG: 200 CAPSULE ORAL at 08:19

## 2024-07-01 RX ADMIN — TRAMADOL HYDROCHLORIDE 50 MG: 50 TABLET ORAL at 22:03

## 2024-07-01 RX ADMIN — IPRATROPIUM BROMIDE AND ALBUTEROL SULFATE 3 ML: 2.5; .5 SOLUTION RESPIRATORY (INHALATION) at 13:40

## 2024-07-01 RX ADMIN — LISINOPRIL 20 MG: 20 TABLET ORAL at 08:19

## 2024-07-01 RX ADMIN — TRAMADOL HYDROCHLORIDE 50 MG: 50 TABLET, COATED ORAL at 12:01

## 2024-07-01 RX ADMIN — ACETAMINOPHEN 650 MG: 325 TABLET ORAL at 16:12

## 2024-07-01 RX ADMIN — SENNOSIDES 17.2 MG: 8.6 TABLET, FILM COATED ORAL at 20:25

## 2024-07-01 RX ADMIN — HYDROCHLOROTHIAZIDE 12.5 MG: 25 TABLET ORAL at 08:19

## 2024-07-01 RX ADMIN — IPRATROPIUM BROMIDE AND ALBUTEROL SULFATE 3 ML: 2.5; .5 SOLUTION RESPIRATORY (INHALATION) at 07:38

## 2024-07-01 RX ADMIN — MENTHOL 10%, METHYL SALICYLATE 15% 1 APPLICATION: 10; 15 CREAM TOPICAL at 22:03

## 2024-07-01 RX ADMIN — ASPIRIN 81 MG: 81 TABLET, COATED ORAL at 08:19

## 2024-07-01 RX ADMIN — POLYETHYLENE GLYCOL 3350 17 G: 17 POWDER, FOR SOLUTION ORAL at 10:08

## 2024-07-01 RX ADMIN — ENOXAPARIN SODIUM 40 MG: 40 INJECTION SUBCUTANEOUS at 08:20

## 2024-07-01 RX ADMIN — ACETAMINOPHEN 650 MG: 325 TABLET ORAL at 22:03

## 2024-07-01 RX ADMIN — LIDOCAINE 4% 1 PATCH: 40 PATCH TOPICAL at 08:20

## 2024-07-01 ASSESSMENT — PAIN DESCRIPTION - LOCATION: LOCATION: CHEST

## 2024-07-01 ASSESSMENT — COGNITIVE AND FUNCTIONAL STATUS - GENERAL
MOBILITY SCORE: 18
STANDING UP FROM CHAIR USING ARMS: A LOT
MOVING TO AND FROM BED TO CHAIR: A LITTLE
MOBILITY SCORE: 14
STANDING UP FROM CHAIR USING ARMS: A LITTLE
MOVING FROM LYING ON BACK TO SITTING ON SIDE OF FLAT BED WITH BEDRAILS: A LITTLE
TURNING FROM BACK TO SIDE WHILE IN FLAT BAD: A LITTLE
TOILETING: A LOT
WALKING IN HOSPITAL ROOM: A LITTLE
HELP NEEDED FOR BATHING: A LITTLE
HELP NEEDED FOR BATHING: A LITTLE
DRESSING REGULAR UPPER BODY CLOTHING: A LOT
MOVING TO AND FROM BED TO CHAIR: A LOT
CLIMB 3 TO 5 STEPS WITH RAILING: A LOT
TURNING FROM BACK TO SIDE WHILE IN FLAT BAD: A LITTLE
MOVING FROM LYING ON BACK TO SITTING ON SIDE OF FLAT BED WITH BEDRAILS: A LITTLE
MOBILITY SCORE: 16
DAILY ACTIVITIY SCORE: 23
CLIMB 3 TO 5 STEPS WITH RAILING: A LOT
DRESSING REGULAR LOWER BODY CLOTHING: A LOT
CLIMB 3 TO 5 STEPS WITH RAILING: A LOT
DRESSING REGULAR UPPER BODY CLOTHING: A LITTLE
MOVING TO AND FROM BED TO CHAIR: A LITTLE
PERSONAL GROOMING: A LITTLE
TURNING FROM BACK TO SIDE WHILE IN FLAT BAD: A LOT
DAILY ACTIVITIY SCORE: 18
DRESSING REGULAR LOWER BODY CLOTHING: A LITTLE
TOILETING: A LITTLE
WALKING IN HOSPITAL ROOM: A LOT
DAILY ACTIVITIY SCORE: 17
PERSONAL GROOMING: A LITTLE
DRESSING REGULAR LOWER BODY CLOTHING: A LITTLE
WALKING IN HOSPITAL ROOM: A LITTLE
STANDING UP FROM CHAIR USING ARMS: A LITTLE

## 2024-07-01 ASSESSMENT — PAIN SCALES - GENERAL
PAINLEVEL_OUTOF10: 7
PAINLEVEL_OUTOF10: 8
PAINLEVEL_OUTOF10: 4
PAINLEVEL_OUTOF10: 3
PAINLEVEL_OUTOF10: 0 - NO PAIN

## 2024-07-01 ASSESSMENT — PAIN DESCRIPTION - ORIENTATION: ORIENTATION: LEFT

## 2024-07-01 ASSESSMENT — PAIN - FUNCTIONAL ASSESSMENT
PAIN_FUNCTIONAL_ASSESSMENT: 0-10

## 2024-07-01 ASSESSMENT — ACTIVITIES OF DAILY LIVING (ADL): HOME_MANAGEMENT_TIME_ENTRY: 13

## 2024-07-01 NOTE — PROGRESS NOTES
Occupational Therapy    Occupational Therapy Treatment    Name: Jayson Elizalde  MRN: [id number]  : 1940  Date: 24  Time Calculation  Start Time: 848  Stop Time: 912  Time Calculation (min): 24 min    Assessment:  OT Assessment: Pt continues to present with pain, decreased balance, strength, and endurance. Pt to benefit from skilled OT services to increase independence with ADL's, transfers, and mobility  Prognosis: Good  Barriers to Discharge: Inaccessible home environment  Evaluation/Treatment Tolerance: Patient limited by pain  Medical Staff Made Aware: Yes  End of Session Communication: Bedside nurse (discussed level of assist needed in selfcare and mobility/transfer components.)  End of Session Patient Position: Up in chair, Alarm on  Plan:  Treatment Interventions: ADL retraining, Functional transfer training, UE strengthening/ROM, Endurance training, Patient/family training  OT Frequency: 3 times per week  OT Discharge Recommendations: Moderate intensity level of continued care  Equipment Recommended upon Discharge: Wheeled walker  OT Recommended Transfer Status: Assist of 1  OT - OK to Discharge: Yes (In accord with OT POC)    Subjective   Previous Visit Info:  OT Last Visit  OT Received On: 24  General:  General  Reason for Referral: Impaired mobility, L 2nd rib fx  Referred By: Félix Gale  Past Medical History Relevant to Rehab: PMHx: CAD, hyperlipidemia, OA, chronic obstructive lung disease  Family/Caregiver Present: No  Prior to Session Communication: Bedside nurse (Pt appropriate for treatment per nurse with no new limitations)  Patient Position Received: Up in chair, Alarm on  Preferred Learning Style: visual, verbal  General Comment: Pt pleasant and cooperative. Difficulty with attention to task and impulsivity noted.  Precautions:  Medical Precautions: Fall precautions (2 liters O2, telemetry.)  Vitals:  Vital Signs  SpO2:  (O2 sats 88-93% in course of session per pulse ox.)  Pain  Assessment:  Pain Assessment  Pain Assessment: 0-10  0-10 (Numeric) Pain Score: 3  Pain Type: Acute pain  Pain Location: Chest  Pain Orientation: Left  Clinical Progression: Gradually improving  Pain Interventions: Repositioned, Ambulation/increased activity (Use of pillow for splinting cough.)     Objective   Cognition:  Overall Cognitive Status: Within Functional Limits  Orientation Level: Oriented X4  Activities of Daily Living: LE Dressing  LE Dressing: Yes  LE Dressing Adaptive Equipment: Reacher, Sock aide, Dressing stick  Pants Level of Assistance: Moderate assistance  Sock Level of Assistance: Minimum assistance  LE Dressing Where Assessed: Chair  LE Dressing Comments: combination of use of tools and modified figure four position. Mod verbal cues to problem solve tasks.    Toileting  Toileting Level of Assistance: Minimum assistance  Where Assessed: Toilet  Toileting Comments: Increased time on task with assist for clothing management and hygiene.     Bed Mobility/Transfers: Transfers  Transfer: Yes  Transfer 1  Transfer From 1: Chair with arms to, Toilet to  Transfer to 1: Stand, Sit  Technique 1: Sit to stand, Stand to sit  Transfer Device 1: Walker  Transfer Level of Assistance 1: Minimum assistance  Trials/Comments 1: Mod verbal cues and redirectiojn for safe hand placement in transitions and sequence. Impulsive with significantly increased time on task.    Functional Mobility:  Functional Mobility  Functional Mobility Performed: Yes  Functional Mobility 1  Surface 1: Level tile  Device 1: Rolling walker  Assistance 1: Contact guard  Comments 1: Bedside chair to/from bathroom toilet.  Outcome Measures:  [facility] Daily Activity  Putting on and taking off regular lower body clothing: A lot  Bathing (including washing, rinsing, drying): A little  Putting on and taking off regular upper body clothing: A little  Toileting, which includes using toilet, bedpan or urinal: A little  Taking care of personal grooming  such as brushing teeth: A little  Eating Meals: None  Daily Activity - Total Score: 18    Education Documentation  Body Mechanics, taught by Hilton Barksdale OT at 7/1/2024 10:58 AM.  Learner: Patient  Readiness: Acceptance  Method: Explanation, Demonstration  Response: Verbalizes Understanding, Demonstrated Understanding, Needs Reinforcement  Comment: Use of pillow for splinting in coughing, safety in mobility and transfers for decreased falls risk.    Precautions, taught by Hilton Barksdale OT at 7/1/2024 10:58 AM.  Learner: Patient  Readiness: Acceptance  Method: Explanation, Demonstration  Response: Verbalizes Understanding, Demonstrated Understanding, Needs Reinforcement  Comment: Use of pillow for splinting in coughing, safety in mobility and transfers for decreased falls risk.    ADL Training, taught by Hilton Barksdale OT at 7/1/2024 10:58 AM.  Learner: Patient  Readiness: Acceptance  Method: Explanation, Demonstration  Response: Verbalizes Understanding, Demonstrated Understanding, Needs Reinforcement  Comment: Use of pillow for splinting in coughing, safety in mobility and transfers for decreased falls risk.    Education Comments  Increased time on task required for direction following in course of tasks. Pt compliant and attentive to education provided in course of session.    Goals:  Encounter Problems       Encounter Problems (Active)       OT Goals       Pt to demonstrate transfers with FWW at mod I  (Progressing)       Start:  06/27/24    Expected End:  07/11/24            Pt will demo increased functional mobility to tolerate tasks necessary to complete ADL routine with FWW at mod I  (Progressing)       Start:  06/27/24    Expected End:  07/11/24            Pt will increase endurance to tolerate 15 min of activity with no more than 1 rest break in order to increase ability to engage in ADL completion.  (Progressing)       Start:  06/27/24    Expected End:  07/11/24            Pt will demo good  static/dynamic standing balance during ADL and functional activity with FWW for improved independence and participation in ADL  (Progressing)       Start:  06/27/24    Expected End:  07/11/24            Pt to demonstrate LB dressing, bathing, and toileting with FWW at mod I  (Progressing)       Start:  06/27/24    Expected End:  07/11/24            Pt to demonstrate UB dressing and grooming with set-up  (Progressing)       Start:  06/27/24    Expected End:  07/11/24

## 2024-07-01 NOTE — PROGRESS NOTES
Physical Therapy    Physical Therapy Treatment    Patient Name: Jayson Elizalde  MRN: [id number]  Today's Date: 7/1/2024  Time Calculation  Start Time: 0814  Stop Time: 0848  Time Calculation (min): 34 min    Assessment/Plan   PT Assessment  PT Assessment Results: Decreased mobility, Decreased endurance, Pain  Rehab Prognosis: Good  Barriers to Discharge: Significant pain  Evaluation/Treatment Tolerance: Patient tolerated treatment well, Patient limited by pain  Medical Staff Made Aware: Yes  Strengths: Ability to acquire knowledge, Coping skills, Attitude of self, Support of Caregivers  Barriers to Participation: Comorbidities  End of Session Communication: Bedside nurse  Assessment Comment: Patient demo's improvements in pain levels and activity tolerance but continues to require extended time to complete tasks and verbal cues for safety. Continue to recommend mod intensity therapy to return to OF.  End of Session Patient Position: Up in chair, Alarm on (OT in room)     PT Plan  Treatment/Interventions: Bed mobility, Transfer training, Gait training, Endurance training  PT Plan: Ongoing PT  PT Frequency: 3 times per week  PT Discharge Recommendations: Moderate intensity level of continued care  Equipment Recommended upon Discharge: Wheeled walker  PT Recommended Transfer Status: Assist x1  PT - OK to Discharge: Yes (Per PT POC)      General Visit Information:   PT  Visit  PT Received On: 07/01/24  General  Reason for Referral: Impaired mobility, L 2nd rib fx  Referred By: Félix Gale  Past Medical History Relevant to Rehab: PMHx: CAD, hyperlipidemia, OA, chronic obstructive lung disease  Family/Caregiver Present: No  Prior to Session Communication: Bedside nurse  Patient Position Received: Bed, 3 rail up, Alarm off, caregiver present  General Comment: Patient pleasant and agreeable to therapy session. Upon arrival, nursing in room giving medication    Subjective   Precautions:  Precautions  Medical Precautions:  Fall precautions (2L O2, tele)  Vital Signs:       Objective   Pain:  Pain Assessment  Pain Assessment: 0-10  0-10 (Numeric) Pain Score: 0 - No pain  Pain Interventions: Ambulation/increased activity  Cognition:  Cognition  Orientation Level: Oriented X4  Coordination:     Postural Control:  Static Sitting Balance  Static Sitting-Balance Support: No upper extremity supported, Feet supported  Static Sitting-Level of Assistance: Close supervision  Static Sitting-Comment/Number of Minutes: 5min  Static Standing Balance  Static Standing-Balance Support: Bilateral upper extremity supported  Static Standing-Level of Assistance: Contact guard  Static Standing-Comment/Number of Minutes: 2min at FWW  Dynamic Standing Balance  Dynamic Standing-Balance Support: Bilateral upper extremity supported  Dynamic Standing-Balance: Lateral lean    Activity Tolerance:  Activity Tolerance  Endurance: Tolerates less than 10 min exercise, no significant change in vital signs  Treatments:                    Bed Mobility  Bed Mobility: Yes  Bed Mobility 1  Bed Mobility 1: Supine to sitting, Scooting  Level of Assistance 1: Minimum assistance  Bed Mobility Comments 1: HOB elevated and use of bedrails. Verbal cues for proper technique. Patient able to complete task but requires extended time secondary to fear of causing pain    Ambulation/Gait Training  Ambulation/Gait Training Performed: Yes  Ambulation/Gait Training 1  Surface 1: Level tile  Device 1: Rolling walker  Assistance 1: Contact guard  Quality of Gait 1: Diminished heel strike, Decreased step length, Forward flexed posture (decreased jaci)  Comments/Distance (ft) 1: 10'x2 with directional turns. Patient requires verbal cues for FWW proximity and posture improvement. Patient demo's decreased pain and has improvements in ambulation tolerance  Transfers  Transfer: Yes  Transfer 1  Transfer From 1: Sit to, Stand to  Transfer to 1: Stand, Sit, Chair with arms  Technique 1: Sit to  stand, Stand to sit  Transfer Device 1: Walker  Transfer Level of Assistance 1: Minimum assistance, Moderate verbal cues  Trials/Comments 1: Patient requires increased time to complete task, although does demo improvements in pain. Verbal cues for safe transition, UE placement, and technique.              Outcome Measures:  [facility] Basic Mobility  Turning from your back to your side while in a flat bed without using bedrails: A little  Moving from lying on your back to sitting on the side of a flat bed without using bedrails: A lot  Moving to and from bed to chair (including a wheelchair): A little  Standing up from a chair using your arms (e.g. wheelchair or bedside chair): A little  To walk in hospital room: A little  Climbing 3-5 steps with railing: A lot  Basic Mobility - Total Score: 16    Education Documentation  Precautions, taught by Shelly Wang PTA at 7/1/2024  9:06 AM.  Learner: Patient  Readiness: Acceptance  Method: Explanation, Demonstration  Response: Verbalizes Understanding, Demonstrated Understanding    Body Mechanics, taught by Shelly Wang PTA at 7/1/2024  9:06 AM.  Learner: Patient  Readiness: Acceptance  Method: Explanation, Demonstration  Response: Verbalizes Understanding, Demonstrated Understanding    Mobility Training, taught by Shelly Wang PTA at 7/1/2024  9:06 AM.  Learner: Patient  Readiness: Acceptance  Method: Explanation, Demonstration  Response: Verbalizes Understanding, Demonstrated Understanding    Education Comments  No comments found.        OP EDUCATION:       Encounter Problems       Encounter Problems (Active)       Balance       STG - Maintains static standing balance without upper extremity support for 3 minutes independently  (Progressing)       Start:  06/28/24    Expected End:  07/12/24            STG - Maintains dynamic sitting balance without upper extremity support for 5 minutes independently  (Progressing)       Start:  06/28/24    Expected End:  07/12/24                Mobility       STG - Patient will ambulate 50' with least restrictive device MOD I  (Progressing)       Start:  06/28/24    Expected End:  07/12/24            STG - Patient will ascend and descend four stairs with use of B rails CGA  (Not Progressing)       Start:  06/28/24    Expected End:  07/12/24               PT Transfers       STG - Patient to transfer to and from sit to supine independently  (Progressing)       Start:  06/28/24    Expected End:  07/12/24            STG - Patient will perform sit to and from stand x5 in 30 seconds without use of UE  (Progressing)       Start:  06/28/24    Expected End:  07/12/24
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
## 2024-07-01 NOTE — PROGRESS NOTES
07/01/24 1000   Discharge Planning   Patient expects to be discharged to: Pre-cert started by  direct pre-cert team through patient's [place] insurance for [facility]. Patient and daughter updated on DC process at bedside by this TCC.

## 2024-07-01 NOTE — PROGRESS NOTES
07/01/24 0945   Discharge Planning   Patient expects to be discharged to: Per patient and his daughter they would like to use patient's [employer] benefits to transition to SNF. Awaiting response from [facility] on if bed will be available tomorrow. Pre-cert to be initated today for [facility] through patient's [place] [employer] once facility confirms bed availability for DC tomorrow.

## 2024-07-01 NOTE — CARE PLAN
Problem: Skin  Goal: Participates in plan/prevention/treatment measures  Outcome: Progressing  Flowsheets (Taken 6/30/2024 2211)  Participates in plan/prevention/treatment measures: Discuss with provider PT/OT consult     Problem: Skin  Goal: Promote skin healing  Outcome: Progressing  Flowsheets (Taken 6/30/2024 2211)  Promote skin healing:   Protective dressings over bony prominences   Assess skin/pad under line(s)/device(s)     Problem: Skin  Goal: Prevent/minimize sheer/friction injuries  Outcome: Progressing  Flowsheets (Taken 6/30/2024 2211)  Prevent/minimize sheer/friction injuries: HOB 30 degrees or less     Problem: Skin  Goal: Promote/optimize nutrition  Outcome: Progressing  Flowsheets (Taken 6/30/2024 2211)  Promote/optimize nutrition: Monitor/record intake including meals     Problem: Pain  Goal: Free from opioid side effects throughout the shift  Outcome: Progressing     Problem: Pain  Goal: Takes deep breaths with improved pain control throughout the shift  Outcome: Progressing   The patient's goals for the shift include      The clinical goals for the shift include pain control

## 2024-07-01 NOTE — PROGRESS NOTES
07/01/24 1100   Discharge Planning   Patient expects to be discharged to: [facility] Status: Pending. Pending Number: [id number]

## 2024-07-02 LAB
ANION GAP SERPL CALC-SCNC: 12 MMOL/L (ref 10–20)
BUN SERPL-MCNC: 19 MG/DL (ref 6–23)
CALCIUM SERPL-MCNC: 8.5 MG/DL (ref 8.6–10.3)
CHLORIDE SERPL-SCNC: 96 MMOL/L (ref 98–107)
CO2 SERPL-SCNC: 28 MMOL/L (ref 21–32)
CREAT SERPL-MCNC: 0.84 MG/DL (ref 0.5–1.3)
EGFRCR SERPLBLD CKD-EPI 2021: 87 ML/MIN/1.73M*2
ERYTHROCYTE [DISTWIDTH] IN BLOOD BY AUTOMATED COUNT: 12.8 % (ref 11.5–14.5)
GLUCOSE SERPL-MCNC: 131 MG/DL (ref 74–99)
HCT VFR BLD AUTO: 39.7 % (ref 41–52)
HGB BLD-MCNC: 13.3 G/DL (ref 13.5–17.5)
MCH RBC QN AUTO: 30 PG (ref 26–34)
MCHC RBC AUTO-ENTMCNC: 33.5 G/DL (ref 32–36)
MCV RBC AUTO: 89 FL (ref 80–100)
NRBC BLD-RTO: 0 /100 WBCS (ref 0–0)
PLATELET # BLD AUTO: 159 X10*3/UL (ref 150–450)
POTASSIUM SERPL-SCNC: 3.7 MMOL/L (ref 3.5–5.3)
RBC # BLD AUTO: 4.44 X10*6/UL (ref 4.5–5.9)
SODIUM SERPL-SCNC: 132 MMOL/L (ref 136–145)
WBC # BLD AUTO: 5.9 X10*3/UL (ref 4.4–11.3)

## 2024-07-02 PROCEDURE — 2500000001 HC RX 250 WO HCPCS SELF ADMINISTERED DRUGS (ALT 637 FOR MEDICARE OP): Performed by: INTERNAL MEDICINE

## 2024-07-02 PROCEDURE — 2500000001 HC RX 250 WO HCPCS SELF ADMINISTERED DRUGS (ALT 637 FOR MEDICARE OP): Performed by: PHYSICIAN ASSISTANT

## 2024-07-02 PROCEDURE — 2500000005 HC RX 250 GENERAL PHARMACY W/O HCPCS: Performed by: PHYSICIAN ASSISTANT

## 2024-07-02 PROCEDURE — 2500000002 HC RX 250 W HCPCS SELF ADMINISTERED DRUGS (ALT 637 FOR MEDICARE OP, ALT 636 FOR OP/ED): Performed by: INTERNAL MEDICINE

## 2024-07-02 PROCEDURE — 2500000004 HC RX 250 GENERAL PHARMACY W/ HCPCS (ALT 636 FOR OP/ED): Performed by: NURSE PRACTITIONER

## 2024-07-02 PROCEDURE — 2500000001 HC RX 250 WO HCPCS SELF ADMINISTERED DRUGS (ALT 637 FOR MEDICARE OP): Performed by: NURSE PRACTITIONER

## 2024-07-02 PROCEDURE — 85027 COMPLETE CBC AUTOMATED: CPT | Performed by: NURSE PRACTITIONER

## 2024-07-02 PROCEDURE — 99239 HOSP IP/OBS DSCHRG MGMT >30: CPT | Performed by: FAMILY MEDICINE

## 2024-07-02 PROCEDURE — 80048 BASIC METABOLIC PNL TOTAL CA: CPT | Performed by: NURSE PRACTITIONER

## 2024-07-02 PROCEDURE — 94668 MNPJ CHEST WALL SBSQ: CPT

## 2024-07-02 PROCEDURE — 1200000002 HC GENERAL ROOM WITH TELEMETRY DAILY

## 2024-07-02 PROCEDURE — 94760 N-INVAS EAR/PLS OXIMETRY 1: CPT

## 2024-07-02 PROCEDURE — 94640 AIRWAY INHALATION TREATMENT: CPT

## 2024-07-02 PROCEDURE — 36415 COLL VENOUS BLD VENIPUNCTURE: CPT | Performed by: NURSE PRACTITIONER

## 2024-07-02 RX ORDER — POLYETHYLENE GLYCOL 3350 17 G/17G
17 POWDER, FOR SOLUTION ORAL DAILY
Start: 2024-07-03 | End: 2024-08-02

## 2024-07-02 RX ORDER — CYCLOBENZAPRINE HCL 5 MG
5 TABLET ORAL 3 TIMES DAILY PRN
Qty: 9 TABLET | Refills: 0 | Status: SHIPPED | OUTPATIENT
Start: 2024-07-02 | End: 2024-07-11

## 2024-07-02 RX ORDER — LIDOCAINE 560 MG/1
1 PATCH PERCUTANEOUS; TOPICAL; TRANSDERMAL DAILY
Qty: 30 PATCH | Refills: 0 | Status: SHIPPED | OUTPATIENT
Start: 2024-07-03

## 2024-07-02 RX ORDER — DOCUSATE SODIUM 100 MG/1
100 CAPSULE, LIQUID FILLED ORAL 2 TIMES DAILY
Start: 2024-07-02

## 2024-07-02 RX ORDER — TRAMADOL HYDROCHLORIDE 50 MG/1
50 TABLET ORAL EVERY 6 HOURS PRN
Qty: 10 TABLET | Refills: 0 | Status: SHIPPED | OUTPATIENT
Start: 2024-07-02 | End: 2024-07-11

## 2024-07-02 RX ADMIN — ASPIRIN 81 MG: 81 TABLET, COATED ORAL at 08:22

## 2024-07-02 RX ADMIN — Medication 1 L/MIN: at 08:44

## 2024-07-02 RX ADMIN — IPRATROPIUM BROMIDE AND ALBUTEROL SULFATE 3 ML: 2.5; .5 SOLUTION RESPIRATORY (INHALATION) at 19:47

## 2024-07-02 RX ADMIN — TRAMADOL HYDROCHLORIDE 50 MG: 50 TABLET, COATED ORAL at 08:22

## 2024-07-02 RX ADMIN — ACEBUTOLOL HYDROCHLORIDE 200 MG: 200 CAPSULE ORAL at 09:00

## 2024-07-02 RX ADMIN — TRAMADOL HYDROCHLORIDE 50 MG: 50 TABLET, COATED ORAL at 17:52

## 2024-07-02 RX ADMIN — ACETAMINOPHEN 650 MG: 325 TABLET ORAL at 17:52

## 2024-07-02 RX ADMIN — HYDROCHLOROTHIAZIDE 12.5 MG: 25 TABLET ORAL at 08:24

## 2024-07-02 RX ADMIN — ACETAMINOPHEN 650 MG: 325 TABLET ORAL at 11:21

## 2024-07-02 RX ADMIN — ENOXAPARIN SODIUM 40 MG: 40 INJECTION SUBCUTANEOUS at 08:22

## 2024-07-02 RX ADMIN — CYCLOBENZAPRINE HYDROCHLORIDE 5 MG: 5 TABLET, FILM COATED ORAL at 08:22

## 2024-07-02 RX ADMIN — ACETAMINOPHEN 650 MG: 325 TABLET ORAL at 04:00

## 2024-07-02 RX ADMIN — LISINOPRIL 20 MG: 20 TABLET ORAL at 08:22

## 2024-07-02 RX ADMIN — IPRATROPIUM BROMIDE AND ALBUTEROL SULFATE 3 ML: 2.5; .5 SOLUTION RESPIRATORY (INHALATION) at 08:44

## 2024-07-02 RX ADMIN — IPRATROPIUM BROMIDE AND ALBUTEROL SULFATE 3 ML: 2.5; .5 SOLUTION RESPIRATORY (INHALATION) at 14:19

## 2024-07-02 RX ADMIN — ACEBUTOLOL HYDROCHLORIDE 200 MG: 200 CAPSULE ORAL at 20:52

## 2024-07-02 ASSESSMENT — COGNITIVE AND FUNCTIONAL STATUS - GENERAL
DAILY ACTIVITIY SCORE: 17
MOVING TO AND FROM BED TO CHAIR: A LOT
DRESSING REGULAR UPPER BODY CLOTHING: A LOT
DAILY ACTIVITIY SCORE: 21
PERSONAL GROOMING: A LITTLE
WALKING IN HOSPITAL ROOM: A LOT
WALKING IN HOSPITAL ROOM: A LITTLE
MOBILITY SCORE: 19
MOBILITY SCORE: 14
DRESSING REGULAR LOWER BODY CLOTHING: A LITTLE
HELP NEEDED FOR BATHING: A LITTLE
STANDING UP FROM CHAIR USING ARMS: A LITTLE
CLIMB 3 TO 5 STEPS WITH RAILING: A LOT
STANDING UP FROM CHAIR USING ARMS: A LOT
MOVING FROM LYING ON BACK TO SITTING ON SIDE OF FLAT BED WITH BEDRAILS: A LITTLE
DRESSING REGULAR LOWER BODY CLOTHING: A LITTLE
TURNING FROM BACK TO SIDE WHILE IN FLAT BAD: A LITTLE
HELP NEEDED FOR BATHING: A LITTLE
TOILETING: A LITTLE
TOILETING: A LOT
MOVING TO AND FROM BED TO CHAIR: A LITTLE
CLIMB 3 TO 5 STEPS WITH RAILING: A LOT

## 2024-07-02 ASSESSMENT — PAIN SCALES - GENERAL: PAINLEVEL_OUTOF10: 0 - NO PAIN

## 2024-07-02 NOTE — PROGRESS NOTES
07/02/24 1337   Discharge Planning   Patient expects to be discharged to: Patient will DC today to [facility]. Transport confirmed for 4PM. Patient, his daughter and the bedside nurse all notified of DC plan and transport time. Bedside nurse was provided with number for nurse to nurse report.   Does the patient need discharge transport arranged? Yes   RoundTrip coordination needed? Yes   Has discharge transport been arranged? Yes   What day is the transport expected? 07/02/24   What time is the transport expected? 1600

## 2024-07-02 NOTE — PROGRESS NOTES
07/02/24 0825   Discharge Planning   Living Arrangements Children   Support Systems Children   Assistance Needed Patient is A&Ox3, on room air at baseline, is independent with ADL's and uses no DME at home (but has devices if needed). Patient is able to drive.   Type of Residence Private residence   Number of Stairs to Enter Residence 3   Number of Stairs Within Residence 10   Who is requesting discharge planning? Provider   Home or Post Acute Services Post acute facilities (Rehab/SNF/etc);In home services   Type of Post Acute Facility Services Skilled nursing;Rehab   Patient expects to be discharged to: [facility] Status: Approved  Authorization Number:[id number]  Dates: 07/01/2024 - 07/05/2024     [place] # [id number]  [facility]   Does the patient need discharge transport arranged? Yes   RoundTrip coordination needed? Yes   Has discharge transport been arranged? No   What day is the transport expected? 07/02/24
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
## 2024-07-02 NOTE — CARE PLAN
Problem: Skin  Goal: Decreased wound size/increased tissue granulation at next dressing change  Outcome: Progressing  Goal: Participates in plan/prevention/treatment measures  Outcome: Progressing  Goal: Prevent/manage excess moisture  Outcome: Progressing  Goal: Prevent/minimize sheer/friction injuries  Outcome: Progressing  Goal: Promote/optimize nutrition  Outcome: Progressing  Goal: Promote skin healing  Outcome: Progressing     Problem: Pain  Goal: Takes deep breaths with improved pain control throughout the shift  Outcome: Progressing  Goal: Free from opioid side effects throughout the shift  Outcome: Progressing   The patient's goals for the shift include  sleep at least 4 hours this shift.    The clinical goals for the shift include pain controlled at a 6 or less and sleep this shift.    Over the shift, the patient did not make progress toward the following goals. Barriers to progression include pain. Recommendations to address these barriers include pain meds, repositioning, and rest.

## 2024-07-03 VITALS
HEART RATE: 67 BPM | SYSTOLIC BLOOD PRESSURE: 138 MMHG | WEIGHT: 215 LBS | HEIGHT: 67 IN | OXYGEN SATURATION: 97 % | RESPIRATION RATE: 18 BRPM | TEMPERATURE: 98.8 F | DIASTOLIC BLOOD PRESSURE: 69 MMHG | BODY MASS INDEX: 33.74 KG/M2

## 2024-07-03 PROCEDURE — 2500000001 HC RX 250 WO HCPCS SELF ADMINISTERED DRUGS (ALT 637 FOR MEDICARE OP): Performed by: NURSE PRACTITIONER

## 2024-07-03 PROCEDURE — 2500000002 HC RX 250 W HCPCS SELF ADMINISTERED DRUGS (ALT 637 FOR MEDICARE OP, ALT 636 FOR OP/ED): Performed by: INTERNAL MEDICINE

## 2024-07-03 PROCEDURE — 2500000001 HC RX 250 WO HCPCS SELF ADMINISTERED DRUGS (ALT 637 FOR MEDICARE OP): Performed by: PHYSICIAN ASSISTANT

## 2024-07-03 PROCEDURE — 94668 MNPJ CHEST WALL SBSQ: CPT

## 2024-07-03 PROCEDURE — 97535 SELF CARE MNGMENT TRAINING: CPT | Mod: GO,CO

## 2024-07-03 PROCEDURE — 2500000002 HC RX 250 W HCPCS SELF ADMINISTERED DRUGS (ALT 637 FOR MEDICARE OP, ALT 636 FOR OP/ED): Performed by: PHYSICIAN ASSISTANT

## 2024-07-03 PROCEDURE — 97530 THERAPEUTIC ACTIVITIES: CPT | Mod: GO,CO

## 2024-07-03 PROCEDURE — 2500000004 HC RX 250 GENERAL PHARMACY W/ HCPCS (ALT 636 FOR OP/ED): Performed by: NURSE PRACTITIONER

## 2024-07-03 PROCEDURE — 94640 AIRWAY INHALATION TREATMENT: CPT

## 2024-07-03 PROCEDURE — 2500000001 HC RX 250 WO HCPCS SELF ADMINISTERED DRUGS (ALT 637 FOR MEDICARE OP): Performed by: INTERNAL MEDICINE

## 2024-07-03 PROCEDURE — 2500000005 HC RX 250 GENERAL PHARMACY W/O HCPCS: Performed by: NURSE PRACTITIONER

## 2024-07-03 PROCEDURE — 94760 N-INVAS EAR/PLS OXIMETRY 1: CPT

## 2024-07-03 PROCEDURE — 2500000005 HC RX 250 GENERAL PHARMACY W/O HCPCS: Performed by: PHYSICIAN ASSISTANT

## 2024-07-03 RX ADMIN — TRAMADOL HYDROCHLORIDE 50 MG: 50 TABLET, COATED ORAL at 03:13

## 2024-07-03 RX ADMIN — HYDROCHLOROTHIAZIDE 12.5 MG: 25 TABLET ORAL at 08:56

## 2024-07-03 RX ADMIN — ACETAMINOPHEN 650 MG: 325 TABLET ORAL at 07:49

## 2024-07-03 RX ADMIN — ASPIRIN 81 MG: 81 TABLET, COATED ORAL at 08:56

## 2024-07-03 RX ADMIN — DOCUSATE SODIUM 100 MG: 100 CAPSULE, LIQUID FILLED ORAL at 08:56

## 2024-07-03 RX ADMIN — ACEBUTOLOL HYDROCHLORIDE 200 MG: 200 CAPSULE ORAL at 08:55

## 2024-07-03 RX ADMIN — ENOXAPARIN SODIUM 40 MG: 40 INJECTION SUBCUTANEOUS at 08:56

## 2024-07-03 RX ADMIN — POLYETHYLENE GLYCOL 3350 17 G: 17 POWDER, FOR SOLUTION ORAL at 08:56

## 2024-07-03 RX ADMIN — LIDOCAINE 4% 1 PATCH: 40 PATCH TOPICAL at 08:56

## 2024-07-03 RX ADMIN — LISINOPRIL 20 MG: 20 TABLET ORAL at 08:56

## 2024-07-03 RX ADMIN — ROSUVASTATIN CALCIUM 10 MG: 10 TABLET, FILM COATED ORAL at 08:56

## 2024-07-03 RX ADMIN — Medication 2 L/MIN: at 09:45

## 2024-07-03 RX ADMIN — IPRATROPIUM BROMIDE AND ALBUTEROL SULFATE 3 ML: 2.5; .5 SOLUTION RESPIRATORY (INHALATION) at 09:42

## 2024-07-03 ASSESSMENT — COGNITIVE AND FUNCTIONAL STATUS - GENERAL
DAILY ACTIVITIY SCORE: 20
HELP NEEDED FOR BATHING: A LITTLE
PERSONAL GROOMING: A LITTLE
TOILETING: A LITTLE
DRESSING REGULAR LOWER BODY CLOTHING: A LITTLE

## 2024-07-03 ASSESSMENT — PAIN SCALES - GENERAL
PAINLEVEL_OUTOF10: 4
PAINLEVEL_OUTOF10: 7
PAINLEVEL_OUTOF10: 4

## 2024-07-03 ASSESSMENT — ACTIVITIES OF DAILY LIVING (ADL): HOME_MANAGEMENT_TIME_ENTRY: 13

## 2024-07-03 ASSESSMENT — PAIN - FUNCTIONAL ASSESSMENT: PAIN_FUNCTIONAL_ASSESSMENT: 0-10

## 2024-07-03 ASSESSMENT — PAIN DESCRIPTION - LOCATION: LOCATION: RIB CAGE

## 2024-07-03 NOTE — PROGRESS NOTES
07/03/24 0827   Discharge Planning   Patient expects to be discharged to: Call placed to [facility] to confirm they will be on time to pick patient up and transport to [facility]. Per [facility] dispatcher a crew will be dropping a patient off at [facility] at 10 AM then will transport this patient shortly after. Call placed to patient's daughter to update on what dispatch is stating.   Does the patient need discharge transport arranged? Yes   RoundTrip coordination needed? Yes   Has discharge transport been arranged? Yes   What day is the transport expected? 07/03/24   What time is the transport expected? 1000

## 2024-07-03 NOTE — PROGRESS NOTES
Occupational Therapy    Occupational Therapy Treatment    Name: Jayson Elizalde  MRN: [id number]  : 1940  Date: 24  Time Calculation  Start Time: 723  Stop Time: 748  Time Calculation (min): 25 min    Assessment:  OT Assessment: Pt continues to present with pain, decreased balance, strength, and endurance. Pt will continue to benefit from skilled OT services to increase independence with ADL's, transfers, and mobility  Prognosis: Good  Barriers to Discharge: Inaccessible home environment  Evaluation/Treatment Tolerance: Patient limited by pain  Medical Staff Made Aware: Yes  End of Session Communication: Bedside nurse (Discussed rediness for discharge to SNF and level of assist needed in selfcare and mobility components.)  End of Session Patient Position: Up in chair, Alarm on  Plan:  Treatment Interventions: ADL retraining, Functional transfer training, UE strengthening/ROM, Endurance training, Patient/family training  OT Frequency: 3 times per week  OT Discharge Recommendations: Moderate intensity level of continued care (Pt remains appropriate for Moderate level intervention in discharge setting d/t contiues to be below prior baseline of independence in ADL/IADL and related mobility and unsafe to independently resume living in exisiting home with current supports.)  Equipment Recommended upon Discharge: Wheeled walker  OT Recommended Transfer Status: Assist of 1  OT - OK to Discharge: Yes (In accord with OT POC)    Subjective   Previous Visit Info:  OT Last Visit  OT Received On: 24  General:  General  Reason for Referral: Impaired ADL and related mobility, L 2nd rib fx  Referred By: Félix Gale  Past Medical History Relevant to Rehab: PMHx: CAD, hyperlipidemia, OA, chronic obstructive lung disease  Family/Caregiver Present: No  Prior to Session Communication: Bedside nurse (Pt was supposed to have discharged to rehab yesterday per nurse, remained at hospital d/t transport issues. Appropriate  for treatment without new limitations. Pain significantly better managed, per nurse.)  Patient Position Received: Bed, 3 rail up, Alarm on  Preferred Learning Style: verbal, visual  Precautions:  Hearing/Visual Limitations: [place]  Medical Precautions: Fall precautions     Pain Assessment:  Pain Assessment  Pain Assessment: 0-10  0-10 (Numeric) Pain Score: 4  Pain Type: Acute pain  Pain Location: Rib cage  Clinical Progression: Gradually improving  Pain Interventions: Repositioned, Ambulation/increased activity  Response to Interventions: Pt reports pain manageable without further intervention.     Objective   Cognition:  Overall Cognitive Status: Within Functional Limits (Decreased impulsivity and distractability over prior session. Improved direction following and device safety noted.)  Orientation Level: Oriented X4  Activities of Daily Living: Grooming  Grooming Level of Assistance: Contact guard  Grooming Where Assessed: Standing sinkside  Grooming Comments: Pt washed hands and dryed them with paper towel at sink with good balance and no need for physical assist in task other than CGA for balance in retrieval and disposal of paper towels in trash..    LE Dressing  LE Dressing: Yes  LE Dressing Adaptive Equipment: Reacher  Pants Level of Assistance: Minimum assistance  LE Dressing Where Assessed: Other (Comment) (Bathroom toilet.)  LE Dressing Comments: Doffed soiled long pants and donned clean long pants with use of reacher and increased time    Toileting  Toileting Level of Assistance: Contact guard, Setup  Where Assessed: Toilet  Toileting Comments: Increased time for clothing management and hygiene.     Bed Mobility/Transfers: Bed Mobility  Bed Mobility: Yes  Bed Mobility 1  Bed Mobility 1: Supine to sitting, Scooting  Level of Assistance 1: Minimum assistance  Bed Mobility Comments 1: HOB ~ 30 degrees.    Transfers  Transfer: Yes  Transfer 1  Transfer From 1: Chair with arms to, Toilet to  Transfer to 1:  Stand, Sit  Technique 1: Sit to stand, Stand to sit  Transfer Device 1: Walker  Transfer Level of Assistance 1: Contact guard  Trials/Comments 1: Min verbal cues for safe hand placement in transitions and sequence. Less impulsive with increased time on task.    Functional Mobility:  Functional Mobility  Functional Mobility Performed: Yes  Functional Mobility 1  Surface 1: Level tile  Device 1: Rolling walker  Assistance 1: Contact guard  Comments 1: Bed>bathroom toilet>sink>bedside chair. Increased time. Min verbal cues for device management and to slow pace within acitvity.    Outcome Measures:  [facility] Daily Activity  Putting on and taking off regular lower body clothing: A little  Bathing (including washing, rinsing, drying): A little  Putting on and taking off regular upper body clothing: None  Toileting, which includes using toilet, bedpan or urinal: A little  Taking care of personal grooming such as brushing teeth: A little  Eating Meals: None  Daily Activity - Total Score: 20    Moderate level of rehab in discharge setting indicated despite high AMPA score d/t need for independent living in prior setting without assist and supervision. Pt demonstrates good potential for same.    Education Documentation  Body Mechanics, taught by Hilton Barksdale OT at 7/3/2024  8:12 AM.  Learner: Patient  Readiness: Acceptance  Method: Explanation, Demonstration  Response: Verbalizes Understanding, Demonstrated Understanding, Needs Reinforcement  Comment: Improving safety and uptake of instruction noted in activities. Continued repetition and reinforcement indicated prior to safe return to independent living.    Precautions, taught by Hilton Barksdale OT at 7/3/2024  8:12 AM.  Learner: Patient  Readiness: Acceptance  Method: Explanation, Demonstration  Response: Verbalizes Understanding, Demonstrated Understanding, Needs Reinforcement  Comment: Improving safety and uptake of instruction noted in activities. Continued repetition  and reinforcement indicated prior to safe return to independent living.    ADL Training, taught by Hilton Barksdale OT at 7/3/2024  8:12 AM.  Learner: Patient  Readiness: Acceptance  Method: Explanation, Demonstration  Response: Verbalizes Understanding, Demonstrated Understanding, Needs Reinforcement  Comment: Improving safety and uptake of instruction noted in activities. Continued repetition and reinforcement indicated prior to safe return to independent living.    Education Comments  Repetition and reeducation needed for uptake of safety instruction and achievement of Modified independent level of function.    Goals:  Encounter Problems       Encounter Problems (Active)       OT Goals       Pt to demonstrate transfers with FWW at mod I  (Progressing)       Start:  06/27/24    Expected End:  07/11/24            Pt will demo increased functional mobility to tolerate tasks necessary to complete ADL routine with FWW at mod I  (Progressing)       Start:  06/27/24    Expected End:  07/11/24            Pt will increase endurance to tolerate 15 min of activity with no more than 1 rest break in order to increase ability to engage in ADL completion.  (Progressing)       Start:  06/27/24    Expected End:  07/11/24            Pt will demo good static/dynamic standing balance during ADL and functional activity with FWW for improved independence and participation in ADL  (Progressing)       Start:  06/27/24    Expected End:  07/11/24            Pt to demonstrate LB dressing, bathing, and toileting with FWW at mod I  (Progressing)       Start:  06/27/24    Expected End:  07/11/24            Pt to demonstrate UB dressing and grooming with set-up  (Progressing)       Start:  06/27/24    Expected End:  07/11/24

## 2024-07-03 NOTE — CARE PLAN
Problem: Skin  Goal: Participates in plan/prevention/treatment measures  Outcome: Progressing  Goal: Prevent/manage excess moisture  Outcome: Progressing     Problem: Pain - Adult  Goal: Verbalizes/displays adequate comfort level or baseline comfort level  Outcome: Progressing     Problem: Safety - Adult  Goal: Free from fall injury  Outcome: Progressing     Problem: Discharge Planning  Goal: Discharge to home or other facility with appropriate resources  Outcome: Progressing     Problem: Chronic Conditions and Co-morbidities  Goal: Patient's chronic conditions and co-morbidity symptoms are monitored and maintained or improved  Outcome: Progressing   The patient's goals for the shift include      The clinical goals for the shift include pain control

## 2024-07-03 NOTE — PROGRESS NOTES
07/03/24 1003   Discharge Planning   Patient expects to be discharged to: [facility] at bedside to transport patient to [facility]. Bedside nurse instructed to call report to nurse at facility.   Does the patient need discharge transport arranged? Yes   RoundTrip coordination needed? Yes   Has discharge transport been arranged? Yes   What day is the transport expected? 07/03/24   What time is the transport expected? 1000        oral

## 2024-07-03 NOTE — PROGRESS NOTES
07/03/24 0812   Discharge Planning   Living Arrangements Children   Support Systems Children   Assistance Needed Patient is A&Ox3, on room air at baseline, is independent with ADL's and uses no DME at home (but has devices if needed). Patient is able to drive.   Type of Residence Private residence   Number of Stairs to Enter Residence 3   Number of Stairs Within Residence 10   Who is requesting discharge planning? Provider   Home or Post Acute Services Post acute facilities (Rehab/SNF/etc);In home services   Type of Post Acute Facility Services Skilled nursing;Rehab   Patient expects to be discharged to: Transport confirmed for yesterday at 4pm. [facility] continued to delayed into the evening. Patient did not want to leave late at night so patient stayed overnight and will leave today at 10 AM. Transport was re-scheduled by unit secretary.   Does the patient need discharge transport arranged? Yes   RoundTrip coordination needed? Yes   Has discharge transport been arranged? Yes   What day is the transport expected? 07/03/24   What time is the transport expected? 1000

## 2024-07-05 ENCOUNTER — LAB REQUISITION (OUTPATIENT)
Dept: LAB | Facility: HOSPITAL | Age: 84
End: 2024-07-05
Payer: MEDICARE

## 2024-07-05 DIAGNOSIS — I48.91 UNSPECIFIED ATRIAL FIBRILLATION (MULTI): ICD-10-CM

## 2024-07-05 DIAGNOSIS — I10 ESSENTIAL (PRIMARY) HYPERTENSION: ICD-10-CM

## 2024-07-05 DIAGNOSIS — E78.5 HYPERLIPIDEMIA, UNSPECIFIED: ICD-10-CM

## 2024-07-05 LAB
ANION GAP SERPL CALC-SCNC: 14 MMOL/L (ref 10–20)
ATRIAL RATE: 58 BPM
BUN SERPL-MCNC: 15 MG/DL (ref 6–23)
CALCIUM SERPL-MCNC: 9.2 MG/DL (ref 8.6–10.3)
CHLORIDE SERPL-SCNC: 96 MMOL/L (ref 98–107)
CO2 SERPL-SCNC: 27 MMOL/L (ref 21–32)
CREAT SERPL-MCNC: 0.84 MG/DL (ref 0.5–1.3)
EGFRCR SERPLBLD CKD-EPI 2021: 87 ML/MIN/1.73M*2
ERYTHROCYTE [DISTWIDTH] IN BLOOD BY AUTOMATED COUNT: 12.8 % (ref 11.5–14.5)
GLUCOSE SERPL-MCNC: 130 MG/DL (ref 74–99)
HCT VFR BLD AUTO: 40.7 % (ref 41–52)
HGB BLD-MCNC: 13.2 G/DL (ref 13.5–17.5)
MCH RBC QN AUTO: 29.4 PG (ref 26–34)
MCHC RBC AUTO-ENTMCNC: 32.4 G/DL (ref 32–36)
MCV RBC AUTO: 91 FL (ref 80–100)
NRBC BLD-RTO: 0 /100 WBCS (ref 0–0)
PLATELET # BLD AUTO: 251 X10*3/UL (ref 150–450)
POTASSIUM SERPL-SCNC: 4.1 MMOL/L (ref 3.5–5.3)
PR INTERVAL: 254 MS
Q ONSET: 225 MS
QRS COUNT: 12 BEATS
QRS DURATION: 140 MS
QT INTERVAL: 436 MS
QTC CALCULATION(BAZETT): 477 MS
QTC FREDERICIA: 463 MS
R AXIS: -78 DEGREES
RBC # BLD AUTO: 4.49 X10*6/UL (ref 4.5–5.9)
SODIUM SERPL-SCNC: 133 MMOL/L (ref 136–145)
T AXIS: 63 DEGREES
T OFFSET: 443 MS
VENTRICULAR RATE: 72 BPM
WBC # BLD AUTO: 6 X10*3/UL (ref 4.4–11.3)

## 2024-07-05 PROCEDURE — 80048 BASIC METABOLIC PNL TOTAL CA: CPT | Mod: OUT | Performed by: EMERGENCY MEDICINE

## 2024-07-05 PROCEDURE — 85027 COMPLETE CBC AUTOMATED: CPT | Mod: OUT | Performed by: EMERGENCY MEDICINE

## 2024-07-08 ENCOUNTER — DOCUMENTATION (OUTPATIENT)
Dept: PRIMARY CARE | Facility: CLINIC | Age: 84
End: 2024-07-08
Payer: MEDICARE

## 2024-07-08 ENCOUNTER — PATIENT OUTREACH (OUTPATIENT)
Dept: PRIMARY CARE | Facility: CLINIC | Age: 84
End: 2024-07-08
Payer: MEDICARE
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
## 2024-07-10 ENCOUNTER — PATIENT OUTREACH (OUTPATIENT)
Dept: PRIMARY CARE | Facility: CLINIC | Age: 84
End: 2024-07-10
Payer: MEDICARE

## 2024-07-10 NOTE — PROGRESS NOTES
Unable to reach patient for a F/U call    LVM with call back number for patient to call if needed   If no voicemail available call attempts x 2 were made to contact the patient to assist with any questions or concerns patient may have.     L/M encouraged patient to call providers for any questions concerns or change in condition
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
## 2024-07-11 ENCOUNTER — OFFICE VISIT (OUTPATIENT)
Dept: PRIMARY CARE | Facility: CLINIC | Age: 84
End: 2024-07-11
Payer: MEDICARE

## 2024-07-11 VITALS
SYSTOLIC BLOOD PRESSURE: 138 MMHG | HEART RATE: 71 BPM | BODY MASS INDEX: 31.61 KG/M2 | HEIGHT: 67 IN | WEIGHT: 201.4 LBS | DIASTOLIC BLOOD PRESSURE: 74 MMHG | OXYGEN SATURATION: 99 % | TEMPERATURE: 96.9 F

## 2024-07-11 DIAGNOSIS — R91.1 INCIDENTAL LUNG NODULE, GREATER THAN OR EQUAL TO 8MM: ICD-10-CM

## 2024-07-11 DIAGNOSIS — R50.9 FEVER, UNSPECIFIED: ICD-10-CM

## 2024-07-11 DIAGNOSIS — Z09 HOSPITAL DISCHARGE FOLLOW-UP: Primary | ICD-10-CM

## 2024-07-11 PROCEDURE — 99496 TRANSJ CARE MGMT HIGH F2F 7D: CPT | Performed by: FAMILY MEDICINE

## 2024-07-11 PROCEDURE — 3078F DIAST BP <80 MM HG: CPT | Performed by: FAMILY MEDICINE

## 2024-07-11 PROCEDURE — 1111F DSCHRG MED/CURRENT MED MERGE: CPT | Performed by: FAMILY MEDICINE

## 2024-07-11 PROCEDURE — 3075F SYST BP GE 130 - 139MM HG: CPT | Performed by: FAMILY MEDICINE

## 2024-07-11 PROCEDURE — 1036F TOBACCO NON-USER: CPT | Performed by: FAMILY MEDICINE

## 2024-07-11 PROCEDURE — 1125F AMNT PAIN NOTED PAIN PRSNT: CPT | Performed by: FAMILY MEDICINE

## 2024-07-11 PROCEDURE — 1159F MED LIST DOCD IN RCRD: CPT | Performed by: FAMILY MEDICINE

## 2024-07-11 ASSESSMENT — ENCOUNTER SYMPTOMS
OCCASIONAL FEELINGS OF UNSTEADINESS: 0
COUGH: 0
DIZZINESS: 0
LOSS OF SENSATION IN FEET: 0
CONSTITUTIONAL NEGATIVE: 1
DEPRESSION: 0
SHORTNESS OF BREATH: 1
CARDIOVASCULAR NEGATIVE: 1
CHEST TIGHTNESS: 0
WHEEZING: 0

## 2024-07-11 ASSESSMENT — PATIENT HEALTH QUESTIONNAIRE - PHQ9
2. FEELING DOWN, DEPRESSED OR HOPELESS: NOT AT ALL
1. LITTLE INTEREST OR PLEASURE IN DOING THINGS: NOT AT ALL
SUM OF ALL RESPONSES TO PHQ9 QUESTIONS 1 AND 2: 0

## 2024-07-11 ASSESSMENT — PAIN SCALES - GENERAL: PAINLEVEL: 4

## 2024-07-11 ASSESSMENT — COLUMBIA-SUICIDE SEVERITY RATING SCALE - C-SSRS
6. HAVE YOU EVER DONE ANYTHING, STARTED TO DO ANYTHING, OR PREPARED TO DO ANYTHING TO END YOUR LIFE?: NO
1. IN THE PAST MONTH, HAVE YOU WISHED YOU WERE DEAD OR WISHED YOU COULD GO TO SLEEP AND NOT WAKE UP?: NO
2. HAVE YOU ACTUALLY HAD ANY THOUGHTS OF KILLING YOURSELF?: NO

## 2024-07-11 NOTE — PROGRESS NOTES
I reviewed and examined the patient. I was present for the key exam elements, and I fully participated in the patient's care. I discussed the management of the care with the resident. I have personally reviewed the pertinent labs and imaging, as well as recent notes, with the patient. I have reviewed the note above and agree with the resident's medical decision making as documented in the resident's note, in addition to the following comments / findings:     Agree with the rest of the plan outlined below by resident physician. No red flags.      The patient understands and agrees to the assessment and plan of care. Patient has also agreed to follow up and comply with the treatment and evaluation as recommended today. Patient was instructed to call the office at [phone] should questions arise regarding their treatment or care.     Liborio Vincent DO, FAOASM  Family Medicine   [address], [address]  [place] [address]     Liborio Vincent DO
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
## 2024-07-12 ENCOUNTER — PATIENT OUTREACH (OUTPATIENT)
Dept: PRIMARY CARE | Facility: CLINIC | Age: 84
End: 2024-07-12
Payer: MEDICARE

## 2024-07-12 NOTE — PROGRESS NOTES
Unable to reach patient for call back after patient's follow up appointment with PCP 7/11/2024     LVM with call back number for patient to call if needed   If no voicemail available call attempts x 2 were made to contact the patient to assist with any questions or concerns patient may have.     L/M Patient encouraged to call providers for any questions concerns or change in condition.

## 2024-07-22 ENCOUNTER — APPOINTMENT (OUTPATIENT)
Dept: PRIMARY CARE | Facility: CLINIC | Age: 84
End: 2024-07-22
Payer: MEDICARE

## 2024-07-23 ENCOUNTER — HOSPITAL ENCOUNTER (OUTPATIENT)
Dept: RADIOLOGY | Facility: HOSPITAL | Age: 84
Discharge: HOME | End: 2024-07-23
Payer: MEDICARE

## 2024-07-23 DIAGNOSIS — R91.1 INCIDENTAL LUNG NODULE, GREATER THAN OR EQUAL TO 8MM: ICD-10-CM

## 2024-07-23 DIAGNOSIS — R50.9 FEVER, UNSPECIFIED: ICD-10-CM

## 2024-07-23 PROCEDURE — A9552 F18 FDG: HCPCS | Performed by: FAMILY MEDICINE

## 2024-07-23 PROCEDURE — 3430000001 HC RX 343 DIAGNOSTIC RADIOPHARMACEUTICALS: Performed by: FAMILY MEDICINE

## 2024-07-23 PROCEDURE — 78816 PET IMAGE W/CT FULL BODY: CPT | Mod: PET TUMOR INIT TX STRAT | Performed by: NUCLEAR MEDICINE

## 2024-07-23 PROCEDURE — 78815 PET IMAGE W/CT SKULL-THIGH: CPT | Mod: PI

## 2024-07-23 RX ORDER — FLUDEOXYGLUCOSE F 18 200 MCI/ML
13.6 INJECTION, SOLUTION INTRAVENOUS
Status: COMPLETED | OUTPATIENT
Start: 2024-07-23 | End: 2024-07-23

## 2024-07-26 ENCOUNTER — OFFICE VISIT (OUTPATIENT)
Dept: PULMONOLOGY | Facility: CLINIC | Age: 84
End: 2024-07-26
Payer: MEDICARE

## 2024-07-26 VITALS
DIASTOLIC BLOOD PRESSURE: 84 MMHG | OXYGEN SATURATION: 96 % | RESPIRATION RATE: 16 BRPM | SYSTOLIC BLOOD PRESSURE: 179 MMHG | WEIGHT: 196 LBS | HEART RATE: 60 BPM | BODY MASS INDEX: 30.7 KG/M2

## 2024-07-26 DIAGNOSIS — R91.1 NODULE OF RIGHT LUNG: ICD-10-CM

## 2024-07-26 DIAGNOSIS — R91.1 INCIDENTAL LUNG NODULE, GREATER THAN OR EQUAL TO 8MM: ICD-10-CM

## 2024-07-26 DIAGNOSIS — J43.9 PULMONARY EMPHYSEMA, UNSPECIFIED EMPHYSEMA TYPE (MULTI): ICD-10-CM

## 2024-07-26 PROCEDURE — 99205 OFFICE O/P NEW HI 60 MIN: CPT | Performed by: INTERNAL MEDICINE

## 2024-07-26 PROCEDURE — 1160F RVW MEDS BY RX/DR IN RCRD: CPT | Performed by: INTERNAL MEDICINE

## 2024-07-26 PROCEDURE — 1159F MED LIST DOCD IN RCRD: CPT | Performed by: INTERNAL MEDICINE

## 2024-07-26 PROCEDURE — 1111F DSCHRG MED/CURRENT MED MERGE: CPT | Performed by: INTERNAL MEDICINE

## 2024-07-26 PROCEDURE — 1126F AMNT PAIN NOTED NONE PRSNT: CPT | Performed by: INTERNAL MEDICINE

## 2024-07-26 PROCEDURE — 3079F DIAST BP 80-89 MM HG: CPT | Performed by: INTERNAL MEDICINE

## 2024-07-26 PROCEDURE — 1036F TOBACCO NON-USER: CPT | Performed by: INTERNAL MEDICINE

## 2024-07-26 PROCEDURE — 3077F SYST BP >= 140 MM HG: CPT | Performed by: INTERNAL MEDICINE

## 2024-07-26 PROCEDURE — 99215 OFFICE O/P EST HI 40 MIN: CPT | Performed by: INTERNAL MEDICINE

## 2024-07-26 ASSESSMENT — ENCOUNTER SYMPTOMS
GASTROINTESTINAL NEGATIVE: 1
DEPRESSION: 0
COUGH: 0
CARDIOVASCULAR NEGATIVE: 1
NEUROLOGICAL NEGATIVE: 1
CHILLS: 0
FEVER: 0
RESPIRATORY NEGATIVE: 1
PSYCHIATRIC NEGATIVE: 1

## 2024-07-26 ASSESSMENT — PATIENT HEALTH QUESTIONNAIRE - PHQ9
1. LITTLE INTEREST OR PLEASURE IN DOING THINGS: NOT AT ALL
2. FEELING DOWN, DEPRESSED OR HOPELESS: NOT AT ALL
SUM OF ALL RESPONSES TO PHQ9 QUESTIONS 1 AND 2: 0

## 2024-07-26 ASSESSMENT — COLUMBIA-SUICIDE SEVERITY RATING SCALE - C-SSRS
1. IN THE PAST MONTH, HAVE YOU WISHED YOU WERE DEAD OR WISHED YOU COULD GO TO SLEEP AND NOT WAKE UP?: NO
6. HAVE YOU EVER DONE ANYTHING, STARTED TO DO ANYTHING, OR PREPARED TO DO ANYTHING TO END YOUR LIFE?: NO
2. HAVE YOU ACTUALLY HAD ANY THOUGHTS OF KILLING YOURSELF?: NO

## 2024-07-26 ASSESSMENT — PAIN SCALES - GENERAL: PAINLEVEL: 0-NO PAIN
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
## 2024-07-30 ENCOUNTER — HOSPITAL ENCOUNTER (OUTPATIENT)
Dept: RESPIRATORY THERAPY | Facility: HOSPITAL | Age: 84
Discharge: HOME | End: 2024-07-30
Payer: MEDICARE

## 2024-07-30 DIAGNOSIS — J43.9 PULMONARY EMPHYSEMA, UNSPECIFIED EMPHYSEMA TYPE (MULTI): ICD-10-CM

## 2024-07-30 LAB
ATRIAL RATE: 61 BPM
MGC ASCENT PFT - FEV1 - POST: 1.41
MGC ASCENT PFT - FEV1 - PRE: 1.19
MGC ASCENT PFT - FEV1 - PREDICTED: 2.54
MGC ASCENT PFT - FVC - POST: 3.5
MGC ASCENT PFT - FVC - PRE: 2.87
MGC ASCENT PFT - FVC - PREDICTED: 3.46
P AXIS: 46 DEGREES
P OFFSET: 167 MS
P ONSET: 124 MS
PR INTERVAL: 202 MS
Q ONSET: 225 MS
QRS COUNT: 10 BEATS
QRS DURATION: 150 MS
QT INTERVAL: 456 MS
QTC CALCULATION(BAZETT): 459 MS
QTC FREDERICIA: 458 MS
R AXIS: -59 DEGREES
T AXIS: 19 DEGREES
T OFFSET: 453 MS
VENTRICULAR RATE: 61 BPM

## 2024-07-30 PROCEDURE — 94664 DEMO&/EVAL PT USE INHALER: CPT

## 2024-07-30 PROCEDURE — 98960 EDU&TRN PT SELF-MGMT NQHP 1: CPT

## 2024-07-30 PROCEDURE — 94729 DIFFUSING CAPACITY: CPT | Performed by: INTERNAL MEDICINE

## 2024-07-30 PROCEDURE — 94060 EVALUATION OF WHEEZING: CPT

## 2024-07-30 PROCEDURE — 94760 N-INVAS EAR/PLS OXIMETRY 1: CPT

## 2024-07-30 PROCEDURE — 94729 DIFFUSING CAPACITY: CPT

## 2024-07-30 PROCEDURE — 94726 PLETHYSMOGRAPHY LUNG VOLUMES: CPT | Performed by: INTERNAL MEDICINE

## 2024-07-30 PROCEDURE — 94726 PLETHYSMOGRAPHY LUNG VOLUMES: CPT

## 2024-07-30 PROCEDURE — 94060 EVALUATION OF WHEEZING: CPT | Performed by: INTERNAL MEDICINE

## 2024-08-07 ENCOUNTER — PATIENT OUTREACH (OUTPATIENT)
Dept: PRIMARY CARE | Facility: CLINIC | Age: 84
End: 2024-08-07
Payer: MEDICARE

## 2024-08-07 LAB
ATRIAL RATE: 61 BPM
P AXIS: 46 DEGREES
P OFFSET: 167 MS
P ONSET: 124 MS
PR INTERVAL: 202 MS
Q ONSET: 225 MS
QRS COUNT: 10 BEATS
QRS DURATION: 150 MS
QT INTERVAL: 456 MS
QTC CALCULATION(BAZETT): 459 MS
QTC FREDERICIA: 458 MS
R AXIS: -59 DEGREES
T AXIS: 19 DEGREES
T OFFSET: 453 MS
VENTRICULAR RATE: 61 BPM

## 2024-08-07 NOTE — PROGRESS NOTES
Call regarding  F/U I spoke with Lisbet DTR states Bill will have further testing a needle BX lung Monday other nash doing well. Lisbet aware that if Bill should have to see a Hem/ONC specialist that there are CM along with SW that can assist with needs as well in community . Lisbet does not feel the need for further TCM call, I will dis enroll.     Lisbet encouraged patient to call providers for any questions concerns or change in condition

## 2024-08-12 ENCOUNTER — HOSPITAL ENCOUNTER (OUTPATIENT)
Dept: RADIOLOGY | Facility: HOSPITAL | Age: 84
Discharge: HOME | End: 2024-08-12
Payer: MEDICARE

## 2024-08-12 VITALS
OXYGEN SATURATION: 95 % | WEIGHT: 196 LBS | DIASTOLIC BLOOD PRESSURE: 78 MMHG | HEART RATE: 61 BPM | BODY MASS INDEX: 31.5 KG/M2 | HEIGHT: 66 IN | SYSTOLIC BLOOD PRESSURE: 127 MMHG | TEMPERATURE: 97.9 F | RESPIRATION RATE: 22 BRPM

## 2024-08-12 DIAGNOSIS — R07.9 CHEST PAIN IN ADULT: ICD-10-CM

## 2024-08-12 DIAGNOSIS — R91.1 NODULE OF RIGHT LUNG: ICD-10-CM

## 2024-08-12 PROCEDURE — 71045 X-RAY EXAM CHEST 1 VIEW: CPT

## 2024-08-12 PROCEDURE — 7100000009 HC PHASE TWO TIME - INITIAL BASE CHARGE

## 2024-08-12 PROCEDURE — 32408 CORE NDL BX LNG/MED PERQ: CPT | Performed by: RADIOLOGY

## 2024-08-12 PROCEDURE — 99152 MOD SED SAME PHYS/QHP 5/>YRS: CPT

## 2024-08-12 PROCEDURE — 32408 CORE NDL BX LNG/MED PERQ: CPT

## 2024-08-12 PROCEDURE — 7100000010 HC PHASE TWO TIME - EACH INCREMENTAL 1 MINUTE

## 2024-08-12 PROCEDURE — 71045 X-RAY EXAM CHEST 1 VIEW: CPT | Performed by: RADIOLOGY

## 2024-08-12 PROCEDURE — 99152 MOD SED SAME PHYS/QHP 5/>YRS: CPT | Performed by: RADIOLOGY

## 2024-08-12 PROCEDURE — 2500000004 HC RX 250 GENERAL PHARMACY W/ HCPCS (ALT 636 FOR OP/ED): Performed by: RADIOLOGY

## 2024-08-12 RX ORDER — FENTANYL CITRATE 50 UG/ML
INJECTION, SOLUTION INTRAMUSCULAR; INTRAVENOUS
Status: COMPLETED | OUTPATIENT
Start: 2024-08-12 | End: 2024-08-12

## 2024-08-12 RX ORDER — MIDAZOLAM HYDROCHLORIDE 1 MG/ML
INJECTION INTRAMUSCULAR; INTRAVENOUS
Status: COMPLETED | OUTPATIENT
Start: 2024-08-12 | End: 2024-08-12

## 2024-08-12 RX ORDER — MIDAZOLAM HYDROCHLORIDE 1 MG/ML
INJECTION, SOLUTION INTRAMUSCULAR; INTRAVENOUS
Status: DISCONTINUED
Start: 2024-08-12 | End: 2024-08-12 | Stop reason: HOSPADM

## 2024-08-12 RX ORDER — FENTANYL CITRATE 50 UG/ML
INJECTION, SOLUTION INTRAMUSCULAR; INTRAVENOUS
Status: DISCONTINUED
Start: 2024-08-12 | End: 2024-08-12 | Stop reason: HOSPADM

## 2024-08-12 ASSESSMENT — PAIN SCALES - GENERAL

## 2024-08-12 ASSESSMENT — PAIN - FUNCTIONAL ASSESSMENT
PAIN_FUNCTIONAL_ASSESSMENT: 0-10

## 2024-08-12 NOTE — DISCHARGE INSTRUCTIONS
Take it easy today.  No heavy lifting. Nothing heavier than a gallon of milk.  May NOT drive.  Remove dressing tomorrow and you may shower tomorrow.  May take tylenol as needed for pain.  Follow up with Dr. Jordan   In 10 days

## 2024-08-20 LAB
LAB AP ASR DISCLAIMER: NORMAL
LABORATORY COMMENT REPORT: NORMAL
PATH REPORT.ADDENDUM SPEC: NORMAL
PATH REPORT.FINAL DX SPEC: NORMAL
PATH REPORT.GROSS SPEC: NORMAL
PATH REPORT.TOTAL CANCER: NORMAL

## 2024-08-21 ENCOUNTER — TELEPHONE (OUTPATIENT)
Dept: SLEEP MEDICINE | Facility: CLINIC | Age: 84
End: 2024-08-21
Payer: MEDICARE

## 2024-08-21 NOTE — TELEPHONE ENCOUNTER
Patients daughter called reporting that patient is having shortness of breath more than a week after a needle biopsy. After speaking with Dr. Douglass nurse Regina patients daughter was instructed to take the patient to the emergency room.

## 2024-08-24 LAB
MGC ASCENT PFT - FEV1 - POST: 1.41
MGC ASCENT PFT - FEV1 - PRE: 1.19
MGC ASCENT PFT - FEV1 - PREDICTED: 2.54
MGC ASCENT PFT - FVC - POST: 3.5
MGC ASCENT PFT - FVC - PRE: 2.87
MGC ASCENT PFT - FVC - PREDICTED: 3.46

## 2024-08-26 ENCOUNTER — APPOINTMENT (OUTPATIENT)
Dept: RADIOLOGY | Facility: HOSPITAL | Age: 84
End: 2024-08-26
Payer: MEDICARE

## 2024-08-26 ENCOUNTER — OFFICE VISIT (OUTPATIENT)
Dept: PULMONOLOGY | Facility: CLINIC | Age: 84
End: 2024-08-26
Payer: MEDICARE

## 2024-08-26 ENCOUNTER — HOSPITAL ENCOUNTER (OUTPATIENT)
Facility: HOSPITAL | Age: 84
Setting detail: OBSERVATION
Discharge: HOME | End: 2024-08-27
Attending: EMERGENCY MEDICINE | Admitting: INTERNAL MEDICINE
Payer: MEDICARE

## 2024-08-26 ENCOUNTER — HOSPITAL ENCOUNTER (OUTPATIENT)
Dept: RADIOLOGY | Facility: HOSPITAL | Age: 84
Discharge: HOME | End: 2024-08-26
Payer: MEDICARE

## 2024-08-26 ENCOUNTER — APPOINTMENT (OUTPATIENT)
Dept: CARDIOLOGY | Facility: HOSPITAL | Age: 84
End: 2024-08-26
Payer: MEDICARE

## 2024-08-26 VITALS
BODY MASS INDEX: 30.83 KG/M2 | RESPIRATION RATE: 16 BRPM | OXYGEN SATURATION: 93 % | HEART RATE: 75 BPM | DIASTOLIC BLOOD PRESSURE: 70 MMHG | WEIGHT: 191 LBS | SYSTOLIC BLOOD PRESSURE: 123 MMHG

## 2024-08-26 DIAGNOSIS — C34.91 ADENOCARCINOMA OF RIGHT LUNG (MULTI): Primary | ICD-10-CM

## 2024-08-26 DIAGNOSIS — J95.811 POSTPROCEDURAL PNEUMOTHORAX: ICD-10-CM

## 2024-08-26 DIAGNOSIS — R91.1 INCIDENTAL LUNG NODULE, GREATER THAN OR EQUAL TO 8MM: ICD-10-CM

## 2024-08-26 DIAGNOSIS — R06.02 SOB (SHORTNESS OF BREATH): ICD-10-CM

## 2024-08-26 DIAGNOSIS — R06.09 OTHER FORM OF DYSPNEA: ICD-10-CM

## 2024-08-26 DIAGNOSIS — J43.9 PULMONARY EMPHYSEMA, UNSPECIFIED EMPHYSEMA TYPE (MULTI): ICD-10-CM

## 2024-08-26 DIAGNOSIS — C34.90 PRIMARY ADENOCARCINOMA OF LUNG, UNSPECIFIED LATERALITY (MULTI): ICD-10-CM

## 2024-08-26 DIAGNOSIS — R06.2 WHEEZE: ICD-10-CM

## 2024-08-26 DIAGNOSIS — J95.811 PNEUMOTHORAX AFTER BIOPSY: Primary | ICD-10-CM

## 2024-08-26 PROBLEM — J93.9 PNEUMOTHORAX: Status: ACTIVE | Noted: 2024-08-26

## 2024-08-26 LAB
ALBUMIN SERPL BCP-MCNC: 4.3 G/DL (ref 3.4–5)
ALP SERPL-CCNC: 69 U/L (ref 33–136)
ALT SERPL W P-5'-P-CCNC: 10 U/L (ref 10–52)
ANION GAP SERPL CALC-SCNC: 13 MMOL/L (ref 10–20)
AST SERPL W P-5'-P-CCNC: 12 U/L (ref 9–39)
BASOPHILS # BLD AUTO: 0.08 X10*3/UL (ref 0–0.1)
BASOPHILS NFR BLD AUTO: 1.1 %
BILIRUB SERPL-MCNC: 0.6 MG/DL (ref 0–1.2)
BUN SERPL-MCNC: 15 MG/DL (ref 6–23)
CALCIUM SERPL-MCNC: 9.3 MG/DL (ref 8.6–10.3)
CHLORIDE SERPL-SCNC: 100 MMOL/L (ref 98–107)
CO2 SERPL-SCNC: 30 MMOL/L (ref 21–32)
CREAT SERPL-MCNC: 1.12 MG/DL (ref 0.5–1.3)
EGFRCR SERPLBLD CKD-EPI 2021: 66 ML/MIN/1.73M*2
EOSINOPHIL # BLD AUTO: 0.31 X10*3/UL (ref 0–0.4)
EOSINOPHIL NFR BLD AUTO: 4.4 %
ERYTHROCYTE [DISTWIDTH] IN BLOOD BY AUTOMATED COUNT: 12.9 % (ref 11.5–14.5)
GLUCOSE SERPL-MCNC: 114 MG/DL (ref 74–99)
HCT VFR BLD AUTO: 43.7 % (ref 41–52)
HGB BLD-MCNC: 14.6 G/DL (ref 13.5–17.5)
IMM GRANULOCYTES # BLD AUTO: 0.02 X10*3/UL (ref 0–0.5)
IMM GRANULOCYTES NFR BLD AUTO: 0.3 % (ref 0–0.9)
INR PPP: 1 (ref 0.9–1.1)
LYMPHOCYTES # BLD AUTO: 1.29 X10*3/UL (ref 0.8–3)
LYMPHOCYTES NFR BLD AUTO: 18.2 %
MCH RBC QN AUTO: 29.9 PG (ref 26–34)
MCHC RBC AUTO-ENTMCNC: 33.4 G/DL (ref 32–36)
MCV RBC AUTO: 89 FL (ref 80–100)
MONOCYTES # BLD AUTO: 0.8 X10*3/UL (ref 0.05–0.8)
MONOCYTES NFR BLD AUTO: 11.3 %
NEUTROPHILS # BLD AUTO: 4.59 X10*3/UL (ref 1.6–5.5)
NEUTROPHILS NFR BLD AUTO: 64.7 %
NRBC BLD-RTO: 0 /100 WBCS (ref 0–0)
PLATELET # BLD AUTO: 212 X10*3/UL (ref 150–450)
POTASSIUM SERPL-SCNC: 4.2 MMOL/L (ref 3.5–5.3)
PROT SERPL-MCNC: 7.2 G/DL (ref 6.4–8.2)
PROTHROMBIN TIME: 11.7 SECONDS (ref 9.8–12.8)
RBC # BLD AUTO: 4.89 X10*6/UL (ref 4.5–5.9)
SODIUM SERPL-SCNC: 139 MMOL/L (ref 136–145)
WBC # BLD AUTO: 7.1 X10*3/UL (ref 4.4–11.3)

## 2024-08-26 PROCEDURE — 71046 X-RAY EXAM CHEST 2 VIEWS: CPT

## 2024-08-26 PROCEDURE — 99215 OFFICE O/P EST HI 40 MIN: CPT | Performed by: INTERNAL MEDICINE

## 2024-08-26 PROCEDURE — 80053 COMPREHEN METABOLIC PANEL: CPT | Performed by: EMERGENCY MEDICINE

## 2024-08-26 PROCEDURE — 32551 INSERTION OF CHEST TUBE: CPT

## 2024-08-26 PROCEDURE — 96374 THER/PROPH/DIAG INJ IV PUSH: CPT | Mod: 59

## 2024-08-26 PROCEDURE — 96375 TX/PRO/DX INJ NEW DRUG ADDON: CPT | Mod: 59

## 2024-08-26 PROCEDURE — 2500000004 HC RX 250 GENERAL PHARMACY W/ HCPCS (ALT 636 FOR OP/ED)

## 2024-08-26 PROCEDURE — 96376 TX/PRO/DX INJ SAME DRUG ADON: CPT | Mod: 59

## 2024-08-26 PROCEDURE — 99285 EMERGENCY DEPT VISIT HI MDM: CPT

## 2024-08-26 PROCEDURE — 3074F SYST BP LT 130 MM HG: CPT | Performed by: INTERNAL MEDICINE

## 2024-08-26 PROCEDURE — 85610 PROTHROMBIN TIME: CPT | Performed by: EMERGENCY MEDICINE

## 2024-08-26 PROCEDURE — 36415 COLL VENOUS BLD VENIPUNCTURE: CPT | Performed by: EMERGENCY MEDICINE

## 2024-08-26 PROCEDURE — 71045 X-RAY EXAM CHEST 1 VIEW: CPT

## 2024-08-26 PROCEDURE — 2500000005 HC RX 250 GENERAL PHARMACY W/O HCPCS

## 2024-08-26 PROCEDURE — 93005 ELECTROCARDIOGRAM TRACING: CPT

## 2024-08-26 PROCEDURE — 71045 X-RAY EXAM CHEST 1 VIEW: CPT | Performed by: RADIOLOGY

## 2024-08-26 PROCEDURE — 1036F TOBACCO NON-USER: CPT | Performed by: INTERNAL MEDICINE

## 2024-08-26 PROCEDURE — 85025 COMPLETE CBC W/AUTO DIFF WBC: CPT | Performed by: EMERGENCY MEDICINE

## 2024-08-26 PROCEDURE — 1160F RVW MEDS BY RX/DR IN RCRD: CPT | Performed by: INTERNAL MEDICINE

## 2024-08-26 PROCEDURE — 3078F DIAST BP <80 MM HG: CPT | Performed by: INTERNAL MEDICINE

## 2024-08-26 PROCEDURE — 71275 CT ANGIOGRAPHY CHEST: CPT

## 2024-08-26 PROCEDURE — 1126F AMNT PAIN NOTED NONE PRSNT: CPT | Performed by: INTERNAL MEDICINE

## 2024-08-26 PROCEDURE — 1159F MED LIST DOCD IN RCRD: CPT | Performed by: INTERNAL MEDICINE

## 2024-08-26 PROCEDURE — 71046 X-RAY EXAM CHEST 2 VIEWS: CPT | Performed by: RADIOLOGY

## 2024-08-26 PROCEDURE — 99223 1ST HOSP IP/OBS HIGH 75: CPT

## 2024-08-26 RX ORDER — ALBUTEROL SULFATE 90 UG/1
2 INHALANT RESPIRATORY (INHALATION) EVERY 4 HOURS PRN
Qty: 18 G | Refills: 11 | Status: SHIPPED | OUTPATIENT
Start: 2024-08-26 | End: 2025-08-26

## 2024-08-26 RX ORDER — FENTANYL CITRATE 50 UG/ML
50 INJECTION, SOLUTION INTRAMUSCULAR; INTRAVENOUS ONCE
Status: COMPLETED | OUTPATIENT
Start: 2024-08-26 | End: 2024-08-26

## 2024-08-26 RX ORDER — LIDOCAINE HYDROCHLORIDE 10 MG/ML
10 INJECTION INFILTRATION; PERINEURAL ONCE
Status: COMPLETED | OUTPATIENT
Start: 2024-08-26 | End: 2024-08-26

## 2024-08-26 RX ORDER — HYDROMORPHONE HYDROCHLORIDE 1 MG/ML
1 INJECTION, SOLUTION INTRAMUSCULAR; INTRAVENOUS; SUBCUTANEOUS ONCE
Status: COMPLETED | OUTPATIENT
Start: 2024-08-26 | End: 2024-08-26

## 2024-08-26 RX ORDER — LIDOCAINE HYDROCHLORIDE 10 MG/ML
INJECTION INFILTRATION; PERINEURAL
Status: DISPENSED
Start: 2024-08-26 | End: 2024-08-27

## 2024-08-26 RX ORDER — LIDOCAINE HYDROCHLORIDE 10 MG/ML
10 INJECTION INFILTRATION; PERINEURAL ONCE
Status: DISCONTINUED | OUTPATIENT
Start: 2024-08-26 | End: 2024-08-27

## 2024-08-26 RX ORDER — HYDROMORPHONE HYDROCHLORIDE 1 MG/ML
INJECTION, SOLUTION INTRAMUSCULAR; INTRAVENOUS; SUBCUTANEOUS
Status: DISCONTINUED
Start: 2024-08-26 | End: 2024-08-27 | Stop reason: HOSPADM

## 2024-08-26 RX ORDER — MIDAZOLAM HYDROCHLORIDE 1 MG/ML
2 INJECTION, SOLUTION INTRAMUSCULAR; INTRAVENOUS ONCE
Status: COMPLETED | OUTPATIENT
Start: 2024-08-26 | End: 2024-08-26

## 2024-08-26 ASSESSMENT — LIFESTYLE VARIABLES
EVER FELT BAD OR GUILTY ABOUT YOUR DRINKING: NO
TOTAL SCORE: 0
HAVE YOU EVER FELT YOU SHOULD CUT DOWN ON YOUR DRINKING: NO
HAVE PEOPLE ANNOYED YOU BY CRITICIZING YOUR DRINKING: NO
EVER HAD A DRINK FIRST THING IN THE MORNING TO STEADY YOUR NERVES TO GET RID OF A HANGOVER: NO

## 2024-08-26 ASSESSMENT — ENCOUNTER SYMPTOMS
NEUROLOGICAL NEGATIVE: 1
DEPRESSION: 0
SHORTNESS OF BREATH: 1
PSYCHIATRIC NEGATIVE: 1
GASTROINTESTINAL NEGATIVE: 1
FEVER: 0
COUGH: 0
CHILLS: 0
SHORTNESS OF BREATH: 1
CARDIOVASCULAR NEGATIVE: 1

## 2024-08-26 ASSESSMENT — COLUMBIA-SUICIDE SEVERITY RATING SCALE - C-SSRS
2. HAVE YOU ACTUALLY HAD ANY THOUGHTS OF KILLING YOURSELF?: NO
1. IN THE PAST MONTH, HAVE YOU WISHED YOU WERE DEAD OR WISHED YOU COULD GO TO SLEEP AND NOT WAKE UP?: NO
6. HAVE YOU EVER DONE ANYTHING, STARTED TO DO ANYTHING, OR PREPARED TO DO ANYTHING TO END YOUR LIFE?: NO

## 2024-08-26 ASSESSMENT — PAIN DESCRIPTION - ORIENTATION: ORIENTATION: RIGHT

## 2024-08-26 ASSESSMENT — PAIN SCALES - GENERAL
PAINLEVEL: 0-NO PAIN
PAINLEVEL_OUTOF10: 10 - WORST POSSIBLE PAIN

## 2024-08-26 ASSESSMENT — PATIENT HEALTH QUESTIONNAIRE - PHQ9
1. LITTLE INTEREST OR PLEASURE IN DOING THINGS: NOT AT ALL
SUM OF ALL RESPONSES TO PHQ9 QUESTIONS 1 AND 2: 0
2. FEELING DOWN, DEPRESSED OR HOPELESS: NOT AT ALL

## 2024-08-26 ASSESSMENT — PAIN DESCRIPTION - LOCATION: LOCATION: CHEST

## 2024-08-26 NOTE — ASSESSMENT & PLAN NOTE
Ct-guided lung bx consistent w/ adeno.  Referral to thoracic surgery and oncology for treatment options.

## 2024-08-26 NOTE — ASSESSMENT & PLAN NOTE
Mod obstruction + BD 7/2024.  Start Trelegy and albuterol.  Consider ID in the future.  Check RAST

## 2024-08-26 NOTE — ED TRIAGE NOTES
Pt reports shortness of breath for the past 2 weeks. Pt had a needle biopsy performed 2 weeks ago, that initially caused a partially collapsed lung on the right side. Today, Pt saw a physician who re-scanned Pt and saw that Pt's Right lung is still partially collapsed. Pt denies any CP. Pt has hx of COPD.

## 2024-08-26 NOTE — ASSESSMENT & PLAN NOTE
2/2 CT-guided lung biopsy.  Repeat CXR today stable but pt is very symptomatic since the procedure and would likely benefit from decompression.  Advised pt to go to ER.

## 2024-08-26 NOTE — ED PROVIDER NOTES
History of Present Illness     History provided by: Patient and Family Member  Limitations to History: None  External Records Reviewed with Brief Summary:  Previous hospitalization and outpatient records for past medical history as well as recent CT-guided lung biopsy approximately 2 weeks ago with pathology concerning for adenocarcinoma.    HPI:  Jayson Elizalde is a 84 y.o. male w PMHx of HTN, HLD, CAD, A-fib s/p watchman procedure not on ac, COPD, and recently diagnosed adenocarcinoma of the lung presenting with pneumothorax following a CT-guided lung biopsy of a lung nodule on the Right. Biopsy caused a right sided pneumothorax, which patient was not aware of, and had been experiencing dyspnea, pleuritic chest pain, and decreased stamina due to pain. Patient had a follow up with his pulmonologist today where CXR revealed a pneumothorax ongoing and patient came in.   No fevers chills syncope n/v/d. No chest pain, HA, vision changes, abdominal pain.     Physical Exam   Triage vitals:  T 36.6 °C (97.9 °F)  HR 64  BP (!) 168/98  RR 20  O2 95 %      General: Awake, alert, in no acute distress  Eyes: Gaze conjugate.  No scleral icterus or injection  HENT: Normo-cephalic, atraumatic. No stridor  CV: Regular rate, regular rhythm. Radial pulses 2+ bilaterally  Resp: Breathing non-labored, speaking in full sentences.  Diminished breath sounds on R anterior lung fields.  GI: Soft, non-distended, non-tender. No rebound or guarding.  : deferred  MSK/Extremities: No gross bony deformities. Moving all extremities  Skin: Warm. Appropriate color  Neuro: Alert. Oriented. Face symmetric. Speech is fluent.  Gross strength and sensation intact in b/l UE and LEs  Psych: Appropriate mood and affect      Medical Decision Making & ED Course   Medical Decision Makin y.o. male  w PMH of adenocarcinoma of the lung, Afib s/p watchmanm HTN, and HLD. 2 weeks ago after which she had a pneumothorax that was not disclosed to the  patient. He is having ongoing shortness of breath and pain over the last 2 weeks and his pulmonologist today was found to have an ongoing right-sided pneumothorax. I am placing a pigtail chest tube now for this patient. He is on 2 L of nasal cannula for oxygen support in the setting of pneumothorax however was 95% on room air.      Labs obtained interpreted with no acute leukocytosis, anemia, thrombocytopenia. No coagulopathy, no renal electrolyte or hepatic dysfunction.     14 [place] chest tube placed with resolution of pneumothorax on chest x-ray post procedurally.  Patient to admitted for further management of his chest tube and pneumothorax.    Patient did experience bleeding from around the site of chest tube insertion, patient remained hemodynamically stable at this time.  Due to pain and discomfort in the area patient did receive additional doses of IV analgesia, pressure was held and dressing was changed in sterile fashion.  Given amount of bleeding despite patient having underlying coagulopathy will obtain CT imaging to assess for any vascular injury during procedure.  Admitting team made aware.  ----       Social Determinants of Health which Significantly Impact Care: None identified     EKG Independent Interpretation: EKG interpreted by myself. Please see ED Course and [facility] for full interpretation.    Independent Result Review and Interpretation: Results were independently reviewed and interpreted by myself. Please see ED course and [facility] for full interpretation.    Chronic conditions affecting the patient's care: As documented in the MDM    The patient was discussed with the following consultants/services: Hospitalist/Admitting Provider who accepted the patient for admission    Care Considerations: As per [facility]    ED Course:  ED Course as of 08/26/24 1809   Mon Aug 26, 2024   1723 EKG independently interpreted with atrial paced rhythm with PACs.  Has sinus rhythm with heart rate of 67 bpm prolonged [place]  interval at 238 MS  MS QTc 434 MS within normal limits.  No acute ST segment elevation or T wave inversions concerning for acute ischemia. [SC]   1724 Patient takes one half of a baby aspirin daily no Plavix or other blood thinners as he is status post Watchman procedure. [SC]      ED Course User Index  [SC] Lesly Mora DO         Diagnoses as of 08/26/24 1809   Pneumothorax after biopsy     Disposition   Admission to medicine for management of pneumothorax    Procedures   Procedures    Patient seen and discussed with ED attending physician.    Lesly Mora DO  Emergency Medicine     Lesly Mora DO  Resident  08/28/24 4263

## 2024-08-26 NOTE — PROGRESS NOTES
Subjective   Patient ID: Jayson Elizalde is a 84 y.o. male who presents for Lung Eval.  H/o HTN here for follow-up of lung nodule.   Mod obstruction + BD (7/2024).  CT-guided lung bx consistent with likely adenocarcinoma.  Had residual PTX post procedure.   No fevers, chills, cough.  ET is about Denies any lung idsease.  No fevers, cough, or chills.  No night sweats.  Has had 10 lb weight loss over a month.  ET is about 30 feet.          Review of Systems   Constitutional:  Negative for chills and fever.   Respiratory:  Positive for shortness of breath. Negative for cough.    Cardiovascular: Negative.    Gastrointestinal: Negative.    Skin:  Negative for rash.   Neurological: Negative.    Psychiatric/Behavioral: Negative.     All other systems reviewed and are negative.      Objective   Physical Exam  Vitals reviewed.   Constitutional:       Appearance: Normal appearance.   HENT:      Head: Normocephalic and atraumatic.   Eyes:      Extraocular Movements: Extraocular movements intact.   Cardiovascular:      Rate and Rhythm: Normal rate and regular rhythm.      Heart sounds: Normal heart sounds.   Pulmonary:      Effort: Pulmonary effort is normal.      Breath sounds: Normal breath sounds.   Abdominal:      Palpations: Abdomen is soft.      Tenderness: There is no abdominal tenderness.   Musculoskeletal:      Cervical back: Normal range of motion.   Skin:     General: Skin is warm.   Neurological:      General: No focal deficit present.      Mental Status: He is alert and oriented to person, place, and time. Mental status is at baseline.   Psychiatric:         Mood and Affect: Mood normal.         Behavior: Behavior normal.         Assessment/Plan   Problem List Items Addressed This Visit       Adenocarcinoma, lung (Multi) - Primary     Ct-guided lung bx consistent w/ adeno.  Referral to thoracic surgery and oncology for treatment options.         Pulmonary emphysema (Multi)     Mod obstruction + BD 7/2024.  Start  Trelegy and albuterol.  Consider MT in the future.  Check RAST         Relevant Orders    Disability Placard    Pneumothorax     2/2 CT-guided lung biopsy.  Repeat CXR today stable but pt is very symptomatic since the procedure and would likely benefit from decompression.  Advised pt to go to ER.          Other Visit Diagnoses       Other form of dyspnea        Relevant Orders    XR chest 2 views    Wheeze        Relevant Orders    Respiratory Allergy Profile IgE    Primary adenocarcinoma of lung, unspecified laterality (Multi)        Relevant Orders    Referral to Thoracic Surgery    Referral to Hematology and Oncology          RTC in 4 months    Time Spent  Prep time on day of patient encounter: 10 minutes  Time spent directly with patient, family or caregiver: 20 minutes  Additional Time Spent on Patient Care Activities: 0 minutes  Documentation Time: 10 minutes  Other Time Spent: 0 minutes  Total: 40 minutes        Behzad Jordan MD 08/26/24 5:21 PM

## 2024-08-27 ENCOUNTER — APPOINTMENT (OUTPATIENT)
Dept: RADIOLOGY | Facility: HOSPITAL | Age: 84
End: 2024-08-27
Payer: MEDICARE

## 2024-08-27 VITALS
HEART RATE: 62 BPM | DIASTOLIC BLOOD PRESSURE: 69 MMHG | WEIGHT: 186.7 LBS | HEIGHT: 67 IN | RESPIRATION RATE: 16 BRPM | BODY MASS INDEX: 29.3 KG/M2 | OXYGEN SATURATION: 97 % | TEMPERATURE: 99.1 F | SYSTOLIC BLOOD PRESSURE: 118 MMHG

## 2024-08-27 PROBLEM — J95.811 PNEUMOTHORAX AFTER BIOPSY: Status: RESOLVED | Noted: 2024-08-26 | Resolved: 2024-08-27

## 2024-08-27 LAB
ALBUMIN SERPL BCP-MCNC: 4.3 G/DL (ref 3.4–5)
ANION GAP SERPL CALC-SCNC: 14 MMOL/L (ref 10–20)
BASOPHILS # BLD AUTO: 0.03 X10*3/UL (ref 0–0.1)
BASOPHILS NFR BLD AUTO: 0.4 %
BUN SERPL-MCNC: 18 MG/DL (ref 6–23)
CALCIUM SERPL-MCNC: 9.5 MG/DL (ref 8.6–10.3)
CHLORIDE SERPL-SCNC: 101 MMOL/L (ref 98–107)
CO2 SERPL-SCNC: 29 MMOL/L (ref 21–32)
CREAT SERPL-MCNC: 0.88 MG/DL (ref 0.5–1.3)
EGFRCR SERPLBLD CKD-EPI 2021: 86 ML/MIN/1.73M*2
EOSINOPHIL # BLD AUTO: 0.02 X10*3/UL (ref 0–0.4)
EOSINOPHIL NFR BLD AUTO: 0.2 %
ERYTHROCYTE [DISTWIDTH] IN BLOOD BY AUTOMATED COUNT: 13 % (ref 11.5–14.5)
GLUCOSE SERPL-MCNC: 150 MG/DL (ref 74–99)
HCT VFR BLD AUTO: 43.3 % (ref 41–52)
HGB BLD-MCNC: 14.3 G/DL (ref 13.5–17.5)
IMM GRANULOCYTES # BLD AUTO: 0.04 X10*3/UL (ref 0–0.5)
IMM GRANULOCYTES NFR BLD AUTO: 0.5 % (ref 0–0.9)
LYMPHOCYTES # BLD AUTO: 0.76 X10*3/UL (ref 0.8–3)
LYMPHOCYTES NFR BLD AUTO: 8.9 %
MAGNESIUM SERPL-MCNC: 1.95 MG/DL (ref 1.6–2.4)
MCH RBC QN AUTO: 30 PG (ref 26–34)
MCHC RBC AUTO-ENTMCNC: 33 G/DL (ref 32–36)
MCV RBC AUTO: 91 FL (ref 80–100)
MONOCYTES # BLD AUTO: 0.7 X10*3/UL (ref 0.05–0.8)
MONOCYTES NFR BLD AUTO: 8.2 %
NEUTROPHILS # BLD AUTO: 6.99 X10*3/UL (ref 1.6–5.5)
NEUTROPHILS NFR BLD AUTO: 81.8 %
NRBC BLD-RTO: 0 /100 WBCS (ref 0–0)
PHOSPHATE SERPL-MCNC: 3.7 MG/DL (ref 2.5–4.9)
PLATELET # BLD AUTO: 203 X10*3/UL (ref 150–450)
POTASSIUM SERPL-SCNC: 4.9 MMOL/L (ref 3.5–5.3)
RBC # BLD AUTO: 4.77 X10*6/UL (ref 4.5–5.9)
SODIUM SERPL-SCNC: 139 MMOL/L (ref 136–145)
WBC # BLD AUTO: 8.5 X10*3/UL (ref 4.4–11.3)

## 2024-08-27 PROCEDURE — 71275 CT ANGIOGRAPHY CHEST: CPT | Performed by: RADIOLOGY

## 2024-08-27 PROCEDURE — 71045 X-RAY EXAM CHEST 1 VIEW: CPT | Mod: 77

## 2024-08-27 PROCEDURE — 2550000001 HC RX 255 CONTRASTS: Performed by: EMERGENCY MEDICINE

## 2024-08-27 PROCEDURE — 83735 ASSAY OF MAGNESIUM: CPT

## 2024-08-27 PROCEDURE — 71045 X-RAY EXAM CHEST 1 VIEW: CPT | Performed by: RADIOLOGY

## 2024-08-27 PROCEDURE — 71046 X-RAY EXAM CHEST 2 VIEWS: CPT

## 2024-08-27 PROCEDURE — 71046 X-RAY EXAM CHEST 2 VIEWS: CPT | Performed by: RADIOLOGY

## 2024-08-27 PROCEDURE — 85025 COMPLETE CBC W/AUTO DIFF WBC: CPT

## 2024-08-27 PROCEDURE — 1200000002 HC GENERAL ROOM WITH TELEMETRY DAILY

## 2024-08-27 PROCEDURE — G0378 HOSPITAL OBSERVATION PER HR: HCPCS

## 2024-08-27 PROCEDURE — 71045 X-RAY EXAM CHEST 1 VIEW: CPT

## 2024-08-27 PROCEDURE — 99239 HOSP IP/OBS DSCHRG MGMT >30: CPT

## 2024-08-27 PROCEDURE — 36415 COLL VENOUS BLD VENIPUNCTURE: CPT

## 2024-08-27 PROCEDURE — 71045 X-RAY EXAM CHEST 1 VIEW
CPT | Mod: REPEAT PROCEDURE BY ANOTHER PHYSICIAN | Performed by: STUDENT IN AN ORGANIZED HEALTH CARE EDUCATION/TRAINING PROGRAM

## 2024-08-27 PROCEDURE — 2500000001 HC RX 250 WO HCPCS SELF ADMINISTERED DRUGS (ALT 637 FOR MEDICARE OP)

## 2024-08-27 PROCEDURE — 80069 RENAL FUNCTION PANEL: CPT

## 2024-08-27 PROCEDURE — 99223 1ST HOSP IP/OBS HIGH 75: CPT | Performed by: INTERNAL MEDICINE

## 2024-08-27 RX ORDER — ROSUVASTATIN CALCIUM 10 MG/1
10 TABLET, COATED ORAL 3 TIMES WEEKLY
Status: DISCONTINUED | OUTPATIENT
Start: 2024-08-28 | End: 2024-08-27 | Stop reason: HOSPADM

## 2024-08-27 RX ORDER — FLUTICASONE FUROATE AND VILANTEROL 100; 25 UG/1; UG/1
1 POWDER RESPIRATORY (INHALATION)
Status: DISCONTINUED | OUTPATIENT
Start: 2024-08-27 | End: 2024-08-27 | Stop reason: HOSPADM

## 2024-08-27 RX ORDER — ACETAMINOPHEN 325 MG/1
650 TABLET ORAL EVERY 6 HOURS PRN
Status: DISCONTINUED | OUTPATIENT
Start: 2024-08-27 | End: 2024-08-27 | Stop reason: HOSPADM

## 2024-08-27 RX ORDER — ACEBUTOLOL HYDROCHLORIDE 200 MG/1
200 CAPSULE ORAL 2 TIMES DAILY
Status: DISCONTINUED | OUTPATIENT
Start: 2024-08-27 | End: 2024-08-27 | Stop reason: HOSPADM

## 2024-08-27 RX ORDER — ENOXAPARIN SODIUM 100 MG/ML
1 INJECTION SUBCUTANEOUS ONCE
Status: DISCONTINUED | OUTPATIENT
Start: 2024-08-27 | End: 2024-08-27

## 2024-08-27 RX ORDER — NAPROXEN SODIUM 220 MG/1
81 TABLET, FILM COATED ORAL ONCE
Status: COMPLETED | OUTPATIENT
Start: 2024-08-27 | End: 2024-08-27

## 2024-08-27 RX ORDER — ALBUTEROL SULFATE 90 UG/1
2 INHALANT RESPIRATORY (INHALATION) EVERY 4 HOURS PRN
Status: DISCONTINUED | OUTPATIENT
Start: 2024-08-27 | End: 2024-08-27 | Stop reason: HOSPADM

## 2024-08-27 RX ORDER — FLUTICASONE FUROATE AND VILANTEROL 100; 25 UG/1; UG/1
1 POWDER RESPIRATORY (INHALATION)
Start: 2024-08-28 | End: 2024-08-27 | Stop reason: HOSPADM

## 2024-08-27 RX ORDER — FLUTICASONE FUROATE AND VILANTEROL 100; 25 UG/1; UG/1
1 POWDER RESPIRATORY (INHALATION)
Qty: 1 EACH | Refills: 0 | Status: SHIPPED | OUTPATIENT
Start: 2024-08-28 | End: 2024-08-27 | Stop reason: HOSPADM

## 2024-08-27 RX ORDER — ENOXAPARIN SODIUM 100 MG/ML
40 INJECTION SUBCUTANEOUS EVERY 24 HOURS
Status: DISCONTINUED | OUTPATIENT
Start: 2024-08-27 | End: 2024-08-27 | Stop reason: HOSPADM

## 2024-08-27 SDOH — SOCIAL STABILITY: SOCIAL INSECURITY: ABUSE: ADULT

## 2024-08-27 SDOH — SOCIAL STABILITY: SOCIAL INSECURITY: HAVE YOU HAD ANY THOUGHTS OF HARMING ANYONE ELSE?: NO

## 2024-08-27 SDOH — SOCIAL STABILITY: SOCIAL INSECURITY: ARE YOU OR HAVE YOU BEEN THREATENED OR ABUSED PHYSICALLY, EMOTIONALLY, OR SEXUALLY BY ANYONE?: NO

## 2024-08-27 SDOH — SOCIAL STABILITY: SOCIAL INSECURITY: DOES ANYONE TRY TO KEEP YOU FROM HAVING/CONTACTING OTHER FRIENDS OR DOING THINGS OUTSIDE YOUR HOME?: NO

## 2024-08-27 SDOH — SOCIAL STABILITY: SOCIAL INSECURITY: WERE YOU ABLE TO COMPLETE ALL THE BEHAVIORAL HEALTH SCREENINGS?: YES

## 2024-08-27 SDOH — SOCIAL STABILITY: SOCIAL INSECURITY: DO YOU FEEL ANYONE HAS EXPLOITED OR TAKEN ADVANTAGE OF YOU FINANCIALLY OR OF YOUR PERSONAL PROPERTY?: NO

## 2024-08-27 SDOH — SOCIAL STABILITY: SOCIAL INSECURITY: DO YOU FEEL UNSAFE GOING BACK TO THE PLACE WHERE YOU ARE LIVING?: NO

## 2024-08-27 SDOH — SOCIAL STABILITY: SOCIAL INSECURITY: ARE THERE ANY APPARENT SIGNS OF INJURIES/BEHAVIORS THAT COULD BE RELATED TO ABUSE/NEGLECT?: NO

## 2024-08-27 SDOH — SOCIAL STABILITY: SOCIAL INSECURITY: HAVE YOU HAD THOUGHTS OF HARMING ANYONE ELSE?: NO

## 2024-08-27 ASSESSMENT — PAIN - FUNCTIONAL ASSESSMENT: PAIN_FUNCTIONAL_ASSESSMENT: 0-10

## 2024-08-27 ASSESSMENT — LIFESTYLE VARIABLES
HOW OFTEN DO YOU HAVE A DRINK CONTAINING ALCOHOL: 4 OR MORE TIMES A WEEK
HOW OFTEN DO YOU HAVE 6 OR MORE DRINKS ON ONE OCCASION: NEVER
AUDIT-C TOTAL SCORE: 4
SKIP TO QUESTIONS 9-10: 1
HOW MANY STANDARD DRINKS CONTAINING ALCOHOL DO YOU HAVE ON A TYPICAL DAY: 1 OR 2
AUDIT-C TOTAL SCORE: 4

## 2024-08-27 ASSESSMENT — COGNITIVE AND FUNCTIONAL STATUS - GENERAL
HELP NEEDED FOR BATHING: A LITTLE
PATIENT BASELINE BEDBOUND: NO
DRESSING REGULAR UPPER BODY CLOTHING: A LITTLE
MOVING FROM LYING ON BACK TO SITTING ON SIDE OF FLAT BED WITH BEDRAILS: A LITTLE
DAILY ACTIVITIY SCORE: 20
WALKING IN HOSPITAL ROOM: A LOT
MOBILITY SCORE: 16
DRESSING REGULAR UPPER BODY CLOTHING: A LITTLE
MOVING TO AND FROM BED TO CHAIR: A LITTLE
DAILY ACTIVITIY SCORE: 20
TURNING FROM BACK TO SIDE WHILE IN FLAT BAD: A LITTLE
WALKING IN HOSPITAL ROOM: A LOT
TURNING FROM BACK TO SIDE WHILE IN FLAT BAD: A LITTLE
STANDING UP FROM CHAIR USING ARMS: A LITTLE
CLIMB 3 TO 5 STEPS WITH RAILING: A LOT
MOVING TO AND FROM BED TO CHAIR: A LITTLE
DRESSING REGULAR LOWER BODY CLOTHING: A LITTLE
STANDING UP FROM CHAIR USING ARMS: A LITTLE
MOBILITY SCORE: 16
MOVING FROM LYING ON BACK TO SITTING ON SIDE OF FLAT BED WITH BEDRAILS: A LITTLE
DRESSING REGULAR LOWER BODY CLOTHING: A LITTLE
CLIMB 3 TO 5 STEPS WITH RAILING: A LOT
HELP NEEDED FOR BATHING: A LITTLE
TOILETING: A LITTLE
TOILETING: A LITTLE

## 2024-08-27 ASSESSMENT — ENCOUNTER SYMPTOMS
NEUROLOGICAL NEGATIVE: 1
CHILLS: 0
GASTROINTESTINAL NEGATIVE: 1
COUGH: 0
FEVER: 0
PSYCHIATRIC NEGATIVE: 1
CARDIOVASCULAR NEGATIVE: 1
SHORTNESS OF BREATH: 1

## 2024-08-27 ASSESSMENT — ACTIVITIES OF DAILY LIVING (ADL)
FEEDING YOURSELF: INDEPENDENT
JUDGMENT_ADEQUATE_SAFELY_COMPLETE_DAILY_ACTIVITIES: YES
LACK_OF_TRANSPORTATION: NO
HEARING - LEFT EAR: HEARING AID
PATIENT'S MEMORY ADEQUATE TO SAFELY COMPLETE DAILY ACTIVITIES?: YES
HEARING - RIGHT EAR: HEARING AID
DRESSING YOURSELF: INDEPENDENT
BATHING: INDEPENDENT
LACK_OF_TRANSPORTATION: NO
ASSISTIVE_DEVICE: HEARING AID - LEFT;HEARING AID - RIGHT
TOILETING: INDEPENDENT
GROOMING: INDEPENDENT
ADEQUATE_TO_COMPLETE_ADL: YES
WALKS IN HOME: INDEPENDENT

## 2024-08-27 ASSESSMENT — PATIENT HEALTH QUESTIONNAIRE - PHQ9
2. FEELING DOWN, DEPRESSED OR HOPELESS: NOT AT ALL
SUM OF ALL RESPONSES TO PHQ9 QUESTIONS 1 & 2: 0
1. LITTLE INTEREST OR PLEASURE IN DOING THINGS: NOT AT ALL

## 2024-08-27 ASSESSMENT — PAIN SCALES - GENERAL
PAINLEVEL_OUTOF10: 0 - NO PAIN
PAINLEVEL_OUTOF10: 4

## 2024-08-27 NOTE — PROGRESS NOTES
Pharmacy Medication History Review    Jayson Elizalde is a 84 y.o. male admitted for Pneumothorax after biopsy. Pharmacy reviewed the patient's [id number] medications and allergies for accuracy.    The list below reflectives the updated PTA list. Please review each medication in order reconciliation for additional clarification and justification.  Medications Prior to Admission   Medication Sig Dispense Refill Last Dose    acebutolol (Sectral) 200 mg capsule Take 1 capsule (200 mg) by mouth 2 times a day.   8/26/2024    acetaminophen (Tylenol 8 HOUR) 650 mg ER tablet Take 1 tablet (650 mg) by mouth twice a day.   8/26/2024    albuterol (Ventolin HFA) 90 mcg/actuation inhaler Inhale 2 puffs every 4 hours if needed for wheezing or shortness of breath. 18 g 11 8/26/2024    aspirin 81 mg chewable tablet Chew 1 tablet (81 mg) 1 time.   8/26/2024    fluticasone-umeclidin-vilanter (TRELEGY-ELLIPTA) 100-62.5-25 mcg blister with device Inhale 1 puff once daily. 1 each 11 8/26/2024    lisinopriL-hydrochlorothiazide 10-12.5 mg tablet Take 1 tablet by mouth once daily.   8/26/2024    rosuvastatin (Crestor) 10 mg tablet Take 1 tablet (10 mg) by mouth 3 times a week.   8/26/2024    docusate sodium (Colace) 100 mg capsule Take 1 capsule (100 mg) by mouth 2 times a day. (Patient not taking: Reported on 7/26/2024)       lidocaine 4 % patch Place 1 patch over 12 hours on the skin once daily. Remove & discard patch within 12 hours or as directed by MD. (Patient not taking: Reported on 7/26/2024) 30 patch 0     methyl salicylate-menthol (Bengay) 15-10 % greaseless cream Apply 1 Application topically every 8 hours. (Patient not taking: Reported on 7/11/2024)       oxygen (O2) gas therapy Inhale 1 each once every 24 hours. (Patient not taking: Reported on 7/11/2024)           The list below reflectives the updated allergy list. Please review each documented allergy for additional clarification and justification.  Allergies   Reviewed by Elba Masters PharmD on 8/27/2024   No Known Allergies         Elba Masters PharmD   PGY1 Pharmacy Resident

## 2024-08-27 NOTE — SIGNIFICANT EVENT
Right chest tube discontinued, occlusive dressing applied, small amount of crepitus noted at site, no active bleeding.  Patient tolerated well, no SOB or pain, has some tenderness at site. Follow up CXR ordered for 1700.

## 2024-08-27 NOTE — CONSULTS
"Nutrition Initial Assessment:   Nutrition Assessment    Reason for Assessment: Admission nursing screening    Patient is a 84 y.o. male presenting with shortness of breath for the past two weeks. PMH of HTN, HLD, COPD, CAD, and adenocarcinoma of the lung presenting with pneumothorax. Met with Pt this am and helped him set up breakfast tray. Pt stated after two bites of eggs he was \"done with them\" requested another orange juice and brought him one. Pt stated he does not eat a lot because \"he is never hungry\".  Pt stated he does not typically drink ensure or boost products but will try if he is brought them. Rec ensure HP 2x daily with L/D providing a total of 320 kcals and 32g of protein.       Nutrition History:  Energy Intake: Poor < 50 %  Food and Nutrient History:  (Patient stated he is not hungry often, but he eats when he is. He does not eat 3 meals a day.)  Food Allergies/Intolerances:  None  GI Symptoms: None  Oral Problems: None       Anthropometrics:  Height: 170.2 cm (5' 7\")   Weight: 84.7 kg (186 lb 11.2 oz)   BMI (Calculated): 29.23             Weight History:   Pt stated his UBW is 191 lbs. Wt hx from chart states wt from 7/11/24 was 201 lbs, and from 4/19/24 was 205 lbs.     Weight Change %:  Weight History / % Weight Change: 7.4% (Wt loss of 7.4% in 6 weeks, and a loss of 9.2% in 4 months. Wt loss of 7.4% is significant loss.)  Significant Weight Loss: Yes  Interpretation of Weight Loss: >5% in 1 month    Nutrition Focused Physical Exam Findings:    Subcutaneous Fat Loss:   Orbital Fat Pads: Mild-Moderate (slight dark circles and slight hollowing)  Buccal Fat Pads: Mild-Moderate (flat cheeks, minimal bounce)  Muscle Wasting:  Temporalis: Severe (hollowed scooping depression)  Pectoralis (Clavicular Region): Mild-Moderate (some protrusion of clavicle)  Deltoid/Trapezius: Mild-Moderate (slight protrusion of acromion process)  Edema:  Edema: none  Physical Findings:  Hair: Negative  Eyes: " Negative  Mouth: Negative  Skin: Negative (Skin intact.)    Nutrition Significant Labs:  CBC Trend:   Results from last 7 days   Lab Units 08/27/24  0633 08/26/24  1709   WBC AUTO x10*3/uL 8.5 7.1   RBC AUTO x10*6/uL 4.77 4.89   HEMOGLOBIN g/dL 14.3 14.6   HEMATOCRIT % 43.3 43.7   MCV fL 91 89   PLATELETS AUTO x10*3/uL 203 212    , BMP Trend:   Results from last 7 days   Lab Units 08/27/24  0633 08/26/24  1709   GLUCOSE mg/dL 150* 114*   CALCIUM mg/dL 9.5 9.3   SODIUM mmol/L 139 139   POTASSIUM mmol/L 4.9 4.2   CO2 mmol/L 29 30   CHLORIDE mmol/L 101 100   BUN mg/dL 18 15   CREATININE mg/dL 0.88 1.12    , A1C:  Lab Results   Component Value Date    HGBA1C 6.0 05/04/2020       Nutrition Specific Medications:  acebutolol, 200 mg, oral, BID  enoxaparin, 40 mg, subcutaneous, q24h  tiotropium, 2 puff, inhalation, Daily   And  fluticasone furoate-vilanteroL, 1 puff, inhalation, Daily  [Held by provider] lisinopril 20 mg, hydroCHLOROthiazide 12.5 mg for Zestoretic/Prinizide, , oral, Daily  [START ON 8/28/2024] rosuvastatin, 10 mg, oral, Once per day on Monday Wednesday Friday         I/O:    ;      Dietary Orders (From admission, onward)       Start     Ordered    08/27/24 1222  Oral nutritional supplements  Until discontinued        Question Answer Comment   Deliver with Lunch chocolate   Deliver with Dinner    Select supplement: Ensure High Protein        08/27/24 1221    08/27/24 0227  May Participate in Room Service  Once        Question:  .  Answer:  Yes    08/27/24 0227 08/27/24 0207  Adult diet Regular  Diet effective now        Question:  Diet type  Answer:  Regular    08/27/24 0206                     Estimated Needs:   Total Energy Estimated Needs (kCal): 2100 kCal  Method for Estimating Needs: 25kcal/kg bw  Total Protein Estimated Needs (g): 84 g  Method for Estimating Needs: 1g/kg BW  Total Fluid Estimated Needs (mL): 2100 mL  Method for Estimating Needs: 1 ml/kcal        Nutrition Diagnosis   Malnutrition  Diagnosis  Patient has Malnutrition Diagnosis: Yes  Diagnosis Status: New  Malnutrition Diagnosis: Severe malnutrition related to starvation  As Evidenced by: Poor PO intakes for extended period of time, wt loss of 7.4% in 6 weeks, and findings from NFPE.            Nutrition Interventions/Recommendations         Nutrition Prescription:  Individualized Nutrition Prescription Provided for :  (Individualized Nutrition Prescription Provided for :  (Individualized nutrition prescription provided for 2100 kcals and 84g of protein to be provided. Also ensure HP 2x daily providing an additional 360 kcals and 32g of protein.))        Nutrition Interventions:   Interventions: Medical food supplement  Meals and Snacks: Other (Comment) (ensure HP 2x daily, providing 360 kcals and 32g of protein.)      Nutrition Monitoring and Evaluation   Food/Nutrient Related History Monitoring  Monitoring and Evaluation Plan: Energy intake  Energy Intake: Estimated energy intake  Criteria: Continue with PO intakes with goal of >50% of meals, and start ensure HP 2x daily providing 320 kcals and 32g of protein.      Time Spent (min): 60 minutes

## 2024-08-27 NOTE — DISCHARGE SUMMARY
Discharge Diagnosis  Pneumothorax after biopsy    Issues Requiring Follow-Up  - Trelogy from pharmacy  -Follow up with PCP    Discharge Meds     Your medication list        START taking these medications        Instructions Last Dose Given Next Dose Due   albuterol 90 mcg/actuation inhaler  Commonly known as: Ventolin HFA      Inhale 2 puffs every 4 hours if needed for wheezing or shortness of breath.       fluticasone-umeclidin-vilanter 100-62.5-25 mcg blister with device  Commonly known as: TRELEGY-ELLIPTA      Inhale 1 puff once daily.              CONTINUE taking these medications        Instructions Last Dose Given Next Dose Due   acebutolol 200 mg capsule  Commonly known as: Sectral           acetaminophen 650 mg ER tablet  Commonly known as: Tylenol 8 HOUR           aspirin 81 mg chewable tablet           lisinopriL-hydrochlorothiazide 10-12.5 mg tablet           rosuvastatin 10 mg tablet  Commonly known as: Crestor                  ASK your doctor about these medications        Instructions Last Dose Given Next Dose Due   docusate sodium 100 mg capsule  Commonly known as: Colace      Take 1 capsule (100 mg) by mouth 2 times a day.       lidocaine 4 % patch      Place 1 patch over 12 hours on the skin once daily. Remove & discard patch within 12 hours or as directed by MD.       methyl salicylate-menthol 15-10 % greaseless cream  Commonly known as: Bengay      Apply 1 Application topically every 8 hours.       oxygen gas therapy  Commonly known as: O2      Inhale 1 each once every 24 hours.                 Where to Get Your Medications        These medications were sent to [id number] [place] [id number]      Phone: [phone]   albuterol 90 mcg/actuation inhaler  fluticasone-umeclidin-vilanter 100-62.5-25 mcg blister with device         Test Results Pending At Discharge  Pending Labs       No current pending labs.            [facility]  Course  Jayson Elizalde is a 84 y.o. male with a PMH of HTN, HLD, CAD, A-fib s/p watchman procedure, COPD, and adenocarcinoma of the lung presenting with pneumothorax following a CT-guided lung bx.      Two weeks ago the patient had a needle biopsy performed for lung nodule likely consistent with adenocarcinoma. Biopsy caused a right sided pneumothorax. Since the biopsy, the patient has been complaining of worsening SOB and pain around the site. Today, the patient had a follow up with Dr. Jordan of pulmonology, who recommended the patient come into the ED for symptomatic pneumothorax. He had a repeat CXR at Dr. Jordan's office that showed a stable pneumothorax.    ED course was largely unremarkable. He had a chest tube placed in the ED. Pulmonology was consulted for chest tube management. It was clamped around 11am and removed around 3pm. He tolerated the procedure well and without complications. Cleared for discharged.     Pertinent Physical Exam At Time of Discharge  Physical Exam  Vitals reviewed.   Constitutional:       Appearance: Normal appearance.   Cardiovascular:      Rate and Rhythm: Normal rate and regular rhythm.      Heart sounds: Normal heart sounds. No murmur heard.     No gallop.   Pulmonary:      Effort: Pulmonary effort is normal. No respiratory distress.      Breath sounds: Normal breath sounds.   Abdominal:      General: Abdomen is flat. There is no distension.      Palpations: Abdomen is soft.      Tenderness: There is no abdominal tenderness.   Musculoskeletal:      Right lower leg: No edema.      Left lower leg: No edema.   Skin:     General: Skin is warm and dry.      Comments: Right chest dressing   Neurological:      Mental Status: He is alert. Mental status is at baseline.   Psychiatric:         Mood and Affect: Mood normal.         Behavior: Behavior normal.         Outpatient Follow-Up  Future Appointments   Date Time Provider Department Center   1/3/2025  3:20 PM Behzad Jordan MD  [id number]         Sal Lazar, DO

## 2024-08-27 NOTE — ED PROCEDURE NOTE
Procedure  Chest Tube Insertion    Performed by: Lesly Mora DO  Authorized by: Forrest Woods MD    Consent:     Consent obtained:  Written    Consent given by:  Patient    Risks, benefits, and alternatives were discussed: yes      Risks discussed:  Bleeding, damage to surrounding structures, infection, pain, nerve damage and incomplete drainage    Alternatives discussed:  Observation  [place] protocol:     Patient identity confirmed:  Verbally with patient  Pre-procedure details:     Skin preparation:  Chlorhexidine    Preparation: Patient was prepped and draped in the usual sterile fashion    Sedation:     Sedation type:  Anxiolysis (2 IV versed)  Anesthesia:     Anesthesia method:  Local infiltration    Local anesthetic:  Lidocaine 1% w/o epi (10)  Procedure details:     Placement location:  R lateral    Scalpel size:  10    Tube size (Sinhala): 14.    Tube connected to:  Suction    Drainage characteristics:  Air only    Suture material:  0 silk    Dressing:  Xeroform gauze and 4x4 sterile gauze  Post-procedure details:     Post-insertion x-ray findings: tube in good position      Procedure completion:  Tolerated well, no immediate complications  Comments:      Right approximately 2 hours after initiation was called back to bedside for significant oozing from around the chest tube dressing.  Additional pressure was applied and 2 additional sutures with 0 silk were applied around the chest tube site.  Patient continues to have some oozing from the site and with concern for vascular injury with placement of CT angio of the chest will be obtained.  Patient remained hemodynamically stable no chest tube output.  Patient remains saturating 97% on 2 L nasal cannula.  No tachycardia or hypotension.    Lesly Mora DO  PGY-3 Emergency Medicine             Lesly Mora DO  Resident  08/26/24 0493

## 2024-08-27 NOTE — CONSULTS
Inpatient consult to Pulmonology  Consult performed by: Behzad Jordan MD  Consult ordered by: Lianet Valdez DO  Reason for consult: PTX  Assessment/Recommendations: 85 yo man h/o COPD, lung adeno admitted w/ PTX    PTX - s/p chest tube.  Resolved.  Will remove chest tube and repeat CXR at 5pm.  OK to dc home if no recurrence of PTX.    Emphysema - pt to start trelegy as outpt.    Lung adeno - referred to thoracic surgery and oncology as outpatient already.    Pt to follow-up with me as previously scheduled.          Reason For Consult  PTX    History Of Present Illness  Jayson Elizalde is a 84 y.o. male presenting with PTX.  Pt w/ h/o emphysema, newly dx lung adeno seen by me yesterday for follow-up CT-guided lung biopsy.  Pt had it done 2 weeks ago and had a post-procedure PTX that was stable.  However, pt was significantly symptomatic at that time and had stayed that way since then.  Repeat CXR yesterday showed stable PTX.  Since he was symptomatic, I sent him to ER for intervention.  Pt had chest tube placed overnight.  Now he's improved and able to yell when previously he could not.  No air leak noted this morning.  Repeat CXR after clamping showed no PTX    Former 20 pack year smoker who quit 40 years ago. Was a  and . Has dog. No family history of lung disease.      Past Medical History  Past Medical History:   Diagnosis Date    Acute UTI (urinary tract infection) 06/27/2024    Atherosclerotic heart disease of native coronary artery without angina pectoris     CAD (coronary artery disease)    Hypertension     Limb pain 06/27/2024    Numbness 06/27/2024    Pain in joint of left shoulder 06/27/2024    Pain in joint of right shoulder 06/27/2024    Pain of right upper extremity 06/27/2024    Personal history of other diseases of the circulatory system 11/04/2016    History of right bundle branch block (RBBB)    Personal history of other diseases of the musculoskeletal system and  connective tissue     History of osteoarthritis    Personal history of other diseases of the respiratory system     History of chronic obstructive lung disease    Personal history of other endocrine, nutritional and metabolic disease     History of hyperlipidemia    Personal history of other endocrine, nutritional and metabolic disease     History of hyperlipidemia    Personal history of other specified conditions     History of weakness    Trigger finger, acquired 06/27/2024       Surgical History  Past Surgical History:   Procedure Laterality Date    EYE SURGERY      OTHER SURGICAL HISTORY  06/28/2013    Nerve Block Transforaminal Epidural    SKIN BIOPSY          Social History  Social History     Tobacco Use    Smoking status: Former     Types: Cigarettes    Smokeless tobacco: Never   Vaping Use    Vaping status: Never Used   Substance Use Topics    Alcohol use: Yes     Comment: wine    Drug use: Never       Family History  No family history on file.     Allergies  Patient has no known allergies.    Review of Systems   Constitutional:  Negative for chills and fever.   Respiratory:  Positive for shortness of breath. Negative for cough.    Cardiovascular: Negative.    Gastrointestinal: Negative.    Skin:  Negative for rash.   Neurological: Negative.    Psychiatric/Behavioral: Negative.     All other systems reviewed and are negative.       Physical Exam  Vitals reviewed.   Constitutional:       Appearance: Normal appearance.   HENT:      Head: Normocephalic and atraumatic.   Eyes:      Extraocular Movements: Extraocular movements intact.   Cardiovascular:      Rate and Rhythm: Normal rate and regular rhythm.      Heart sounds: Normal heart sounds.   Pulmonary:      Effort: Pulmonary effort is normal.      Breath sounds: Normal breath sounds.      Comments: Right chest tube in place, tidaling, no air leak.  Abdominal:      Palpations: Abdomen is soft.      Tenderness: There is no abdominal tenderness.  "  Musculoskeletal:      Cervical back: Normal range of motion.   Skin:     General: Skin is warm.   Neurological:      General: No focal deficit present.      Mental Status: He is alert and oriented to person, place, and time. Mental status is at baseline.   Psychiatric:         Mood and Affect: Mood normal.         Behavior: Behavior normal.          Vital Signs  Blood pressure 118/69, pulse 62, temperature 37.3 °C (99.1 °F), temperature source Temporal, resp. rate 16, height 1.702 m (5' 7\"), weight 84.7 kg (186 lb 11.2 oz), SpO2 97%.  Oxygen Therapy  SpO2: 97 %  Medical Gas Therapy: Supplemental oxygen  Medical Gas Delivery Method: Nasal cannula       Relevant Results  XR chest 1 view 08/26/2024    Narrative  Interpreted By:  Mercedez Prieto,  STUDY:  XR CHEST 1 VIEW;  8/26/2024 8:31 pm    INDICATION:  Signs/Symptoms:pneumothorax.    COMPARISON:  08/26/2024, 08/12/2024    ACCESSION NUMBER(S):  [id number]    ORDERING CLINICIAN:  ARGENTINA CEJA    FINDINGS:  AP    Interval placement of a right chest tube. Similar appearance of a  dual lead left subclavian pacemaker.    CARDIOMEDIASTINAL SILHOUETTE:  Cardiomediastinal silhouette is stable in size and configuration.  Tortuosity of the thoracic aorta with atherosclerotic calcifications.    LUNGS:  Coarse nonspecific bilateral interstitial opacities are noted with  slightly hazy appearance in the right. No sizable pleural effusion  interval improvement with possible resolution of right pneumothorax  within constraints of imaging. The left greater than right  costophrenic angle is excluded from imaging.    ABDOMEN:  No remarkable upper abdominal findings.    BONES:  Degenerative changes.    Impression  1.  Interval right chest tube placement without significant  pneumothorax identified.  2. Interstitial opacities which may be chronic. Superimposed atypical  infection or edema not excluded.        MACRO:  None    Signed by: Mercedez Prieto 8/26/2024 9:26 PM  Dictation " workstation:   [id number]    Scheduled medications  acebutolol, 200 mg, oral, BID  enoxaparin, 40 mg, subcutaneous, q24h  tiotropium, 2 puff, inhalation, Daily   And  fluticasone furoate-vilanteroL, 1 puff, inhalation, Daily  [Held by provider] lisinopril 20 mg, hydroCHLOROthiazide 12.5 mg for Zestoretic/Prinizide, , oral, Daily  [START ON 8/28/2024] rosuvastatin, 10 mg, oral, Once per day on Monday Wednesday Friday      Continuous medications     PRN medications  PRN medications: acetaminophen, albuterol    Results for orders placed or performed during the hospital encounter of 08/26/24 (from the past 24 hour(s))   CBC and Auto Differential   Result Value Ref Range    WBC 7.1 4.4 - 11.3 x10*3/uL    nRBC 0.0 0.0 - 0.0 /100 WBCs    RBC 4.89 4.50 - 5.90 x10*6/uL    Hemoglobin 14.6 13.5 - 17.5 g/dL    Hematocrit 43.7 41.0 - 52.0 %    MCV 89 80 - 100 fL    MCH 29.9 26.0 - 34.0 pg    MCHC 33.4 32.0 - 36.0 g/dL    RDW 12.9 11.5 - 14.5 %    Platelets 212 150 - 450 x10*3/uL    Neutrophils % 64.7 40.0 - 80.0 %    Immature Granulocytes %, Automated 0.3 0.0 - 0.9 %    Lymphocytes % 18.2 13.0 - 44.0 %    Monocytes % 11.3 2.0 - 10.0 %    Eosinophils % 4.4 0.0 - 6.0 %    Basophils % 1.1 0.0 - 2.0 %    Neutrophils Absolute 4.59 1.60 - 5.50 x10*3/uL    Immature Granulocytes Absolute, Automated 0.02 0.00 - 0.50 x10*3/uL    Lymphocytes Absolute 1.29 0.80 - 3.00 x10*3/uL    Monocytes Absolute 0.80 0.05 - 0.80 x10*3/uL    Eosinophils Absolute 0.31 0.00 - 0.40 x10*3/uL    Basophils Absolute 0.08 0.00 - 0.10 x10*3/uL   Comprehensive metabolic panel   Result Value Ref Range    Glucose 114 (H) 74 - 99 mg/dL    Sodium 139 136 - 145 mmol/L    Potassium 4.2 3.5 - 5.3 mmol/L    Chloride 100 98 - 107 mmol/L    Bicarbonate 30 21 - 32 mmol/L    Anion Gap 13 10 - 20 mmol/L    Urea Nitrogen 15 6 - 23 mg/dL    Creatinine 1.12 0.50 - 1.30 mg/dL    eGFR 66 >60 mL/min/1.73m*2    Calcium 9.3 8.6 - 10.3 mg/dL    Albumin 4.3 3.4 - 5.0 g/dL    Alkaline  Phosphatase 69 33 - 136 U/L    Total Protein 7.2 6.4 - 8.2 g/dL    AST 12 9 - 39 U/L    Bilirubin, Total 0.6 0.0 - 1.2 mg/dL    ALT 10 10 - 52 U/L   Protime-INR   Result Value Ref Range    Protime 11.7 9.8 - 12.8 seconds    INR 1.0 0.9 - 1.1   ECG 12 lead   Result Value Ref Range    Ventricular Rate 67 BPM    Atrial Rate 67 BPM    MS Interval 238 ms    QRS Duration 140 ms    QT Interval 440 ms    QTC Calculation(Bazett) 464 ms    R Axis -78 degrees    T Axis 41 degrees    QRS Count 11 beats    Q Onset 227 ms    P Onset 147 ms    P Offset 174 ms    T Offset 447 ms    QTC Fredericia 456 ms   CBC and Auto Differential   Result Value Ref Range    WBC 8.5 4.4 - 11.3 x10*3/uL    nRBC 0.0 0.0 - 0.0 /100 WBCs    RBC 4.77 4.50 - 5.90 x10*6/uL    Hemoglobin 14.3 13.5 - 17.5 g/dL    Hematocrit 43.3 41.0 - 52.0 %    MCV 91 80 - 100 fL    MCH 30.0 26.0 - 34.0 pg    MCHC 33.0 32.0 - 36.0 g/dL    RDW 13.0 11.5 - 14.5 %    Platelets 203 150 - 450 x10*3/uL    Neutrophils % 81.8 40.0 - 80.0 %    Immature Granulocytes %, Automated 0.5 0.0 - 0.9 %    Lymphocytes % 8.9 13.0 - 44.0 %    Monocytes % 8.2 2.0 - 10.0 %    Eosinophils % 0.2 0.0 - 6.0 %    Basophils % 0.4 0.0 - 2.0 %    Neutrophils Absolute 6.99 (H) 1.60 - 5.50 x10*3/uL    Immature Granulocytes Absolute, Automated 0.04 0.00 - 0.50 x10*3/uL    Lymphocytes Absolute 0.76 (L) 0.80 - 3.00 x10*3/uL    Monocytes Absolute 0.70 0.05 - 0.80 x10*3/uL    Eosinophils Absolute 0.02 0.00 - 0.40 x10*3/uL    Basophils Absolute 0.03 0.00 - 0.10 x10*3/uL   Renal Function Panel   Result Value Ref Range    Glucose 150 (H) 74 - 99 mg/dL    Sodium 139 136 - 145 mmol/L    Potassium 4.9 3.5 - 5.3 mmol/L    Chloride 101 98 - 107 mmol/L    Bicarbonate 29 21 - 32 mmol/L    Anion Gap 14 10 - 20 mmol/L    Urea Nitrogen 18 6 - 23 mg/dL    Creatinine 0.88 0.50 - 1.30 mg/dL    eGFR 86 >60 mL/min/1.73m*2    Calcium 9.5 8.6 - 10.3 mg/dL    Phosphorus 3.7 2.5 - 4.9 mg/dL    Albumin 4.3 3.4 - 5.0 g/dL   Magnesium    Result Value Ref Range    Magnesium 1.95 1.60 - 2.40 mg/dL         Assessment/Plan     83 yo man h/o COPD, lung adeno admitted w/ PTX    PTX - s/p chest tube.  Resolved.  Will remove chest tube and repeat CXR at 5pm.  OK to dc home if no recurrence of PTX.    Emphysema - pt to start trelegy as outpt.    Lung adeno - referred to thoracic surgery and oncology as outpatient already.    Pt to follow-up with me as previously scheduled.    Behzad Jordan MD

## 2024-08-27 NOTE — DISCHARGE INSTRUCTIONS
Thank you for choosing [facility] - it has been a pleasure taking part in your medical care. Please follow up with your Primary Medical Physician within 1 week of discharge and any specialists as noted within 1-2 weeks for further workup. If your symptoms should persist or worsen, please return immediately to the nearest Emergency Room for further care.      BELOW ARE IMPORTANT PATIENT INSTRUCTIONS:     You were admitted to the hospital for:  - pneumothorax requiring chest tube placement    After leaving the hospital it is important that you:   -follow up with your PCP for further management of your care.  -please  your Trelogy inhaler prescribed by Dr. Jordan (pulmonology).   -Keep your wound dressing on for several days. OK to remove it after that.     Please return to the ED if symptoms worsen. Please take all of your discharge paperwork with you to your next appointments.

## 2024-08-27 NOTE — HOSPITAL COURSE
Jayson Elizalde is a 84 y.o. male with a PMH of HTN, HLD, CAD, A-fib s/p watchman procedure, COPD, and adenocarcinoma of the lung presenting with pneumothorax following a CT-guided lung bx.      Two weeks ago the patient had a needle biopsy performed for lung nodule likely consistent with adenocarcinoma. Biopsy caused a right sided pneumothorax. Since the biopsy, the patient has been complaining of worsening SOB and pain around the site. Today, the patient had a follow up with Dr. Jordan of pulmonology, who recommended the patient come into the ED for symptomatic pneumothorax. He had a repeat CXR at Dr. Jordan's office that showed a stable pneumothorax.    ED course was largely unremarkable. He had a chest tube placed in the ED. Pulmonology was consulted for chest tube management. It was clamped around 11am and removed around 3pm. He tolerated the procedure well and without complications. Cleared for discharged.

## 2024-08-27 NOTE — PROGRESS NOTES
08/27/24 0815   Discharge Planning   Living Arrangements Children  (Home with daughter Lisbet and son-in-law)   Support Systems Children   Assistance Needed A&OX3; independent with ADLs with no DME; drives; room air baseline - currently on 2L NC   Type of Residence Private residence   Number of Stairs to Enter Residence 14   Number of Stairs Within Residence 0   Do you have animals or pets at home? Yes   Type of Animals or Pets Daughter has 1 dog in the home   Who is requesting discharge planning? Provider   Expected Discharge Disposition Home  (TBD pending workup. Pt currently on supplemental O2)   Does the patient need discharge transport arranged? Yes   RoundTrip coordination needed? Yes   Has discharge transport been arranged? No   Financial Resource Strain   How hard is it for you to pay for the very basics like food, housing, medical care, and heating? Not hard   Housing Stability   In the last 12 months, was there a time when you were not able to pay the mortgage or rent on time? N   In the past 12 months, how many times have you moved where you were living? 1   At any time in the past 12 months, were you homeless or living in a shelter (including now)? N   Transportation Needs   In the past 12 months, has lack of transportation kept you from medical appointments or from getting medications? no   In the past 12 months, has lack of transportation kept you from meetings, work, or from getting things needed for daily living? No

## 2024-08-27 NOTE — H&P
History Of Present Illness  Jayson Elizalde is a 84 y.o. male with a PMH of HTN, HLD, CAD, A-fib s/p watchman procedure, COPD, and adenocarcinoma of the lung presenting with pneumothorax following a CT-guided lung bx.     Two weeks ago the patient had a needle biopsy performed for lung nodule likely consistent with adenocarcinoma. Biopsy caused a right sided pneumothorax. Since the biopsy, the patient has been complaining of worsening SOB and pain around the site. Today, the patient had a follow up with Dr. Jordan of pulmonology, who recommended the patient come into the ED for symptomatic pneumothorax. He had a repeat CXR at Dr. Jordan's office that showed a stable pneumothorax. During interview, the patient described being frustrated with this hospital and said he was only here because his children live nearby.     In the ED, the patient denies fever, chills, and cough. Patient had a 10lb weight loss over a month.      ED Course  Vitals  Temp 97.9, HR 64, /98, RR 20, SpO2 95%    Labs   Glucose 114, otherwise all unremarkable    Imaging  CXR  1.  Interval right chest tube placement without significant   pneumothorax identified.   2. Interstitial opacities which may be chronic. Superimposed atypical infection or edema not excluded     Interventions  Chest tube placement   Past Medical History  Past Medical History:   Diagnosis Date    Acute UTI (urinary tract infection) 06/27/2024    Atherosclerotic heart disease of native coronary artery without angina pectoris     CAD (coronary artery disease)    Hypertension     Limb pain 06/27/2024    Numbness 06/27/2024    Pain in joint of left shoulder 06/27/2024    Pain in joint of right shoulder 06/27/2024    Pain of right upper extremity 06/27/2024    Personal history of other diseases of the circulatory system 11/04/2016    History of right bundle branch block (RBBB)    Personal history of other diseases of the musculoskeletal system and connective tissue     History of  "osteoarthritis    Personal history of other diseases of the respiratory system     History of chronic obstructive lung disease    Personal history of other endocrine, nutritional and metabolic disease     History of hyperlipidemia    Personal history of other endocrine, nutritional and metabolic disease     History of hyperlipidemia    Personal history of other specified conditions     History of weakness    Trigger finger, acquired 06/27/2024       Surgical History  Past Surgical History:   Procedure Laterality Date    EYE SURGERY      OTHER SURGICAL HISTORY  06/28/2013    Nerve Block Transforaminal Epidural    SKIN BIOPSY          Social History  He reports that he has quit smoking. His smoking use included cigarettes. He has never used smokeless tobacco. He reports current alcohol use. He reports that he does not use drugs.    Family History  No family history on file.     Allergies  Patient has no known allergies.    Review of Systems   Respiratory:  Positive for shortness of breath.    Cardiovascular:  Positive for chest pain.   All other systems reviewed and are negative.       Physical Exam  Constitutional:       Appearance: Normal appearance.   Cardiovascular:      Rate and Rhythm: Normal rate and regular rhythm.   Pulmonary:      Effort: Pulmonary effort is normal.      Breath sounds: Normal breath sounds.      Comments: Chest tube in place, bleeding from chest tube site  Abdominal:      Palpations: Abdomen is soft.   Musculoskeletal:         General: No tenderness.      Right lower leg: No edema.      Left lower leg: No edema.   Neurological:      Mental Status: He is alert.   Psychiatric:         Behavior: Behavior normal.          Last Recorded Vitals  Blood pressure 126/80, pulse 69, temperature 36.6 °C (97.9 °F), temperature source Temporal, resp. rate 13, height 1.702 m (5' 7\"), weight 86.6 kg (191 lb), SpO2 98%.    Relevant Results  Scheduled medications  gelatin absorbable, , ,   gelatin " sponge,absorb-porcine, , ,   gelatin sponge,absorb-porcine, 1 each, topical (top), Once  lidocaine, 10 mL, infiltration, Once  lidocaine, , ,   oxygen, , inhalation, Continuous - Inhalation      Continuous medications     PRN medications  PRN medications: gelatin absorbable, gelatin sponge,absorb-porcine, lidocaine  Results for orders placed or performed during the hospital encounter of 08/26/24 (from the past 24 hour(s))   CBC and Auto Differential   Result Value Ref Range    WBC 7.1 4.4 - 11.3 x10*3/uL    nRBC 0.0 0.0 - 0.0 /100 WBCs    RBC 4.89 4.50 - 5.90 x10*6/uL    Hemoglobin 14.6 13.5 - 17.5 g/dL    Hematocrit 43.7 41.0 - 52.0 %    MCV 89 80 - 100 fL    MCH 29.9 26.0 - 34.0 pg    MCHC 33.4 32.0 - 36.0 g/dL    RDW 12.9 11.5 - 14.5 %    Platelets 212 150 - 450 x10*3/uL    Neutrophils % 64.7 40.0 - 80.0 %    Immature Granulocytes %, Automated 0.3 0.0 - 0.9 %    Lymphocytes % 18.2 13.0 - 44.0 %    Monocytes % 11.3 2.0 - 10.0 %    Eosinophils % 4.4 0.0 - 6.0 %    Basophils % 1.1 0.0 - 2.0 %    Neutrophils Absolute 4.59 1.60 - 5.50 x10*3/uL    Immature Granulocytes Absolute, Automated 0.02 0.00 - 0.50 x10*3/uL    Lymphocytes Absolute 1.29 0.80 - 3.00 x10*3/uL    Monocytes Absolute 0.80 0.05 - 0.80 x10*3/uL    Eosinophils Absolute 0.31 0.00 - 0.40 x10*3/uL    Basophils Absolute 0.08 0.00 - 0.10 x10*3/uL   Comprehensive metabolic panel   Result Value Ref Range    Glucose 114 (H) 74 - 99 mg/dL    Sodium 139 136 - 145 mmol/L    Potassium 4.2 3.5 - 5.3 mmol/L    Chloride 100 98 - 107 mmol/L    Bicarbonate 30 21 - 32 mmol/L    Anion Gap 13 10 - 20 mmol/L    Urea Nitrogen 15 6 - 23 mg/dL    Creatinine 1.12 0.50 - 1.30 mg/dL    eGFR 66 >60 mL/min/1.73m*2    Calcium 9.3 8.6 - 10.3 mg/dL    Albumin 4.3 3.4 - 5.0 g/dL    Alkaline Phosphatase 69 33 - 136 U/L    Total Protein 7.2 6.4 - 8.2 g/dL    AST 12 9 - 39 U/L    Bilirubin, Total 0.6 0.0 - 1.2 mg/dL    ALT 10 10 - 52 U/L   Protime-INR   Result Value Ref Range    Protime  11.7 9.8 - 12.8 seconds    INR 1.0 0.9 - 1.1     XR chest 1 view    Result Date: 8/26/2024  Interpreted By:  Mercedez Prieto, STUDY: XR CHEST 1 VIEW;  8/26/2024 8:31 pm   INDICATION: Signs/Symptoms:pneumothorax.   COMPARISON: 08/26/2024, 08/12/2024   ACCESSION NUMBER(S): [id number]   ORDERING CLINICIAN: ARGENTINA CEJA   FINDINGS: AP   Interval placement of a right chest tube. Similar appearance of a dual lead left subclavian pacemaker.   CARDIOMEDIASTINAL SILHOUETTE: Cardiomediastinal silhouette is stable in size and configuration. Tortuosity of the thoracic aorta with atherosclerotic calcifications.   LUNGS: Coarse nonspecific bilateral interstitial opacities are noted with slightly hazy appearance in the right. No sizable pleural effusion interval improvement with possible resolution of right pneumothorax within constraints of imaging. The left greater than right costophrenic angle is excluded from imaging.   ABDOMEN: No remarkable upper abdominal findings.   BONES: Degenerative changes.       1.  Interval right chest tube placement without significant pneumothorax identified. 2. Interstitial opacities which may be chronic. Superimposed atypical infection or edema not excluded.       MACRO: None   Signed by: Mercedez Prieto 8/26/2024 9:26 PM Dictation workstation:   [id number]    CT guided percutaneous biopsy lung    Addendum Date: 8/26/2024    Interpreted By:  Baldomero Alcaraz, ADDENDUM: Right lung nodule, biopsy: -- Minimally represented invasive non small cell carcinoma, suggestive of adenocarcinoma involving lung parenchyma.   Signed by: Baldomero Alcaraz 8/26/2024 7:35 AM   -------- ORIGINAL REPORT -------- Dictation workstation:   [id number]    Result Date: 8/26/2024  Interpreted By:  Baldomero Alcaraz, STUDY: CT GUIDED PERCUTANEOUS BIOPSY LUNG;  8/12/2024 10:56 am   INDICATION: Signs/Symptoms:lung right.   COMPARISON: PET-CT from 07/23/2024   ACCESSION NUMBER(S): [id number]   ORDERING CLINICIAN: EMERITA CONROY    TECHNIQUE: Interventional radiologist:   Baldomero Alcaraz MD   CONSENT: The patient was informed of the nature of the proposed procedure. The purposes, alternatives, risks, and benefits were explained and discussed. All questions were answered and consent was obtained.   SEDATION: Moderate conscious IV sedation services (supervision of administration, induction, and maintenance) were provided by the physician performing the procedure with intravenous fentanyl 100 mcg and versed 1 mg during the procedure. The physician was assisted by an independent trained observer, an interventional radiology nurse, in the continuous monitoring of patient level of consciousness and physiologic status.   The patient was placed in prone position on the interventional CT scanner bed. An initial  image and axial CT images were obtained at the level of the  chest without IV contrast. A percutaneous  right posterior approach was chosen for biopsy. The skin site was marked, prepped, and draped in usual sterile fashion. Local anesthesia of the skin and deep tissues was administered with 1% lidocaine. Then, using a  20 gauge core biopsy needle, a total of  2 core biopsy specimens were obtained of the targeted nodule. Moderate size pneumothorax is seen following the procedure. The needles were removed and sterile dressing applied.   The patient tolerated the procedure well without evidence of additional immediate complication.  The patient was then monitored in the radiology recovery area prior to discharge. 2 follow-up chest x-rays demonstrate stable moderate size right-sided pneumothorax. Patient's vital signs continued stable and the oxygen saturation levels were continuously improving during the recovery period. Patient was discharged home in satisfactory and stable condition.     FINDINGS: CT-guided biopsy of perihilar right lung nodule as above.   Intraprocedural CT images demonstrate percutaneous biopsy of the lung nodule in  progress.  Postprocedural pneumothorax as above.       Technically successful CT-guided biopsy of right lung nodule as above. Samples were sent to surgical pathology for further evaluation.   MACRO: None   Signed by: Baldomero Alcaraz 8/12/2024 3:52 PM Dictation workstation:   [id number]    Complete Pulmonary Function Test Pre/Post Bronchodialator (Spirometry Pre/Post/DLCO/Lung Volumes)    Result Date: 8/24/2024  Findings: FEV1 and FEV1/FVC ratio are reduced.  FVC is normal.  There is a positive bronchodilator response.  Lung volumes are normal.  Diffusing capacity is normal.  Impression: FEV1 is moderately reduced with a decreased FEV1/FVC ratio.  There is a positive bronchodilator response.  Diffusing capacity is normal.  These findings are consistent with a moderately obstructive pulmonary process such as COPD with an element of bronchoreactivity.    XR chest 1 view    Result Date: 8/12/2024  Interpreted By:  Baldomero Alcaraz, STUDY: XR CHEST 1 VIEW; 8/12/2024 12:01 pm   INDICATION: Signs/Symptoms:post right lung biopsy   COMPARISON: 08/12/2024 at 11:13 a.m..   ACCESSION NUMBER(S): [id number]   ORDERING CLINICIAN: BALDOMERO ALCARAZ   TECHNIQUE: Single AP view of the chest.   FINDINGS: There is a stable moderate size right sided pneumothorax. Cardiac silhouette is stable. No significant interval change since previous exam.       Stable moderate size right-sided pneumothorax. Patient is stable hemodynamically and clinically therefore will be discharged home with close supervision from his family members. These findings and recommendations were discussed with the patient and patient's daughter at the time of discharge. All patient's questions and concerns were answered and addressed to patient's satisfaction.   Signed by: Baldomero Alcaraz 8/12/2024 3:29 PM Dictation workstation:   [id number]    XR chest 1 view    Result Date: 8/12/2024  Interpreted By:  Baldomero Alcaraz, STUDY: XR CHEST 1 VIEW; 8/12/2024 11:18 am   INDICATION:  Signs/Symptoms:chest pain after biopsey   COMPARISON: 06/30/2024.   ACCESSION NUMBER(S): [id number]   ORDERING CLINICIAN: BALDOMERO ALCARAZ   TECHNIQUE: AP view of the chest.   FINDINGS: Tortuous aorta with wall calcifications similar to prior. Cardiac silhouette stable in size. There is a moderate size right-sided pneumothorax. Patient is status post CT-guided right lung nodule biopsy. Left lung with left basilar atelectasis or scarring.       Moderate size right study pneumothorax following CT-guided biopsy of right lung nodule. Will follow-up the patient with additional radiograph to document stability.   Signed by: Baldomero Alcaraz 8/12/2024 11:54 AM Dictation workstation:   [id number]      Assessment/Plan   Assessment & Plan  Pneumothorax after biopsy    Jayson Elizalde is a 84 y.o. male with a PMH of HTN, HLD, CAD, A-fib s/p watchman, COPD, and adenocarcinoma of the lung presenting with pneumothorax following a CT-guided lung bx.     Acute Medical Issues  #Right sided pneumothorax  - Patient reports that since his lung nodule biopsy two weeks ago, he's been experiencing worsening SOB as well as right sided chest pain.  - Patient had a follow up appointment with Dr. Jordan of pulmonology, recommending he report to the ER after CXR showed right sided pneumothorax.  -ER staff placed chest tube, however procedure was complicated by post procedure bleeding  - CT angio chest w/ and w/o contrast was unremarkable for focal hematoma or active extravasation of the chest tube site, showcasing a small apical right pneumothorax.  - Pulmonology consulted, appreciate recs  - Will get repeat CXR tomorrow AM    Chronic Medical Issues   #HTN  - Continue acebutolol 200 mg BID  - Holding home lisinopril-hydrochlorothiazide 20-12.5 mg     #CAD  #HLD  - Continue rosuvastatin 10 mg three times weekly  - Continue aspirin 81 mg daily    #COPD  - Was prescribed Trelegy and albuterol at pulmonologist visit, will continue both  -Consider IN in  the future    #Adenocarcinoma of the lung  -CT-guided lung bx consistent with adenocarcinoma,   - Follow up with thoracic surgery and oncology as outpatient.    Dispo: 84 year old male admitted with pneumothorax s/p chest tube placement. Estimated LOS > 48 hours    Shanna Musa, M3 medical student      I saw and examined the patient, I agree with the med student's documentation as described above, in addition:    Jayson Elizalde is an 84 year old male with PMH as listed above admitted to the hospital for right sided pneumothorax. Patient seen and examined at bedside. Patient expressed significant frustration at our hospital about not following up with him about the pneumothorax as well as for the care it provided to his wife, and is frustrated that he's here in the first place. Patient went to an outpatient visit with Dr. Jordan of pulmonology, who got an x-ray that showed the right sided PTX, and given his 2 weeks of symptoms, he was advised to go to the ED. Patient only complaining of shortness of breath and chest pain since the biopsy 2 weeks ago that hasn't gotten better. Patient hemodynamically stable and afebrile, was put on 2L of O2 for comfort, however is tolerating being on room air. Blood work was completely unremarkable. Chest tube was placed by ER staff, and post-procedure CXR showed improvement of pneumothorax. However, he was found to have bleeding from the chest tube site dripping onto his gown and bedsheets. A CT angio chest w/ and w/o contrast was obtained after, that did not show any focal site of bleeding or hematoma. Bleeding improved, and patient transferred to the floors in stable condition. Overall plan is consulting pulmonology for further management of chest tube, symptomatic care with inhalers and Tylenol PRN. Will get a repeat CXR in the AM. He is ultimately pending pulmonology clearance prior to discharge.

## 2024-08-28 ENCOUNTER — APPOINTMENT (OUTPATIENT)
Dept: PULMONOLOGY | Facility: CLINIC | Age: 84
End: 2024-08-28
Payer: MEDICARE

## 2024-08-28 ENCOUNTER — PATIENT OUTREACH (OUTPATIENT)
Dept: PRIMARY CARE | Facility: CLINIC | Age: 84
End: 2024-08-28
Payer: MEDICARE

## 2024-08-28 ENCOUNTER — DOCUMENTATION (OUTPATIENT)
Dept: PRIMARY CARE | Facility: CLINIC | Age: 84
End: 2024-08-28
Payer: MEDICARE

## 2024-08-28 LAB
ATRIAL RATE: 67 BPM
ELECTRONICALLY SIGNED BY: NORMAL
FOCUSED SOLID TUMOR DNA/RNA RESULTS: NORMAL
P OFFSET: 174 MS
P ONSET: 147 MS
PR INTERVAL: 238 MS
Q ONSET: 227 MS
QRS COUNT: 11 BEATS
QRS DURATION: 140 MS
QT INTERVAL: 440 MS
QTC CALCULATION(BAZETT): 464 MS
QTC FREDERICIA: 456 MS
R AXIS: -78 DEGREES
T AXIS: 41 DEGREES
T OFFSET: 447 MS
VENTRICULAR RATE: 67 BPM

## 2024-08-28 NOTE — PROGRESS NOTES
Discharge Facility:  [facility]     Discharge Diagnosis:    Pneumothorax after biopsy     Issues Requiring Follow-Up  - Trelogy from pharmacy  -Follow up with PCP       Admission Date: 8/27/2024   Discharge Date:  8/27/2024     PCP Appointment Date:     Specialist Appointment Date: Dr. Jordan     Hospital Encounter and Summary Linked: Yes    See discharge assessment below for further details     Engagement  Call Start Time: 1059 (8/28/2024 10:59 AM)    Medications  Medications reviewed with patient/caregiver?: -- (Pt states not home will calll tomorrow to review meds per request) (8/28/2024 10:59 AM)  Prescription Comments: Pt states not home will calll tomorrow to review meds per request (8/28/2024 10:59 AM)  Is the patient taking all medications as directed (includes completed medication regime)?: -- (Pt states not home will calll tomorrow to review meds per request) (8/28/2024 10:59 AM)  Medication Comments: Pt states not home will calll tomorrow to review meds per request (8/28/2024 10:59 AM)    Appointments  Does the patient have a primary care provider?: Yes (8/28/2024 10:59 AM)  Care Management Interventions: Verified appointment date/time/provider (8/28/2024 10:59 AM)  Has the patient kept scheduled appointments due by today?: Yes (8/28/2024 10:59 AM)  Care Management Interventions: Advised to schedule with specialist (8/28/2024 10:59 AM)    Self Management  What is the home health agency?: NA (8/28/2024 10:59 AM)  What Durable Medical Equipment (DME) was ordered?: NA (8/28/2024 10:59 AM)    Patient Teaching  Does the patient have access to their discharge instructions?: Yes (8/28/2024 10:59 AM)  Care Management Interventions: Reviewed instructions with patient (8/28/2024 10:59 AM)  What is the patient's perception of their health status since discharge?: Improving (8/28/2024 10:59 AM)  Is the patient/caregiver able to teach back the hierarchy of who to call/visit for symptoms/problems? PCP, Specialist,  Home Health nurse, Urgent Care, ED, 911: Yes (8/28/2024 10:59 AM)  Patient/Caregiver Education Comments: Patient aware to keep dressing on Chest tube removal site X4 days then remove , reviewed SSX infection and when to call providers for any questions concerns or change in cond. Patient was/ provided number to Patient Advocacy line D/T hospital stay concerns. Patient states he is not home right now / request agrees to F/U tomorrow to review meds. Patient was thankful for the F/U call and agrees to TCM calls . Patient encouraged to call Dr. Jordan pulmonary office to discuss F/U appt. Patient has this nurse number for any non emergent needs or questions (8/28/2024 10:59 AM)

## 2024-08-29 ENCOUNTER — PATIENT OUTREACH (OUTPATIENT)
Dept: PRIMARY CARE | Facility: CLINIC | Age: 84
End: 2024-08-29
Payer: MEDICARE

## 2024-08-29 NOTE — SIGNIFICANT EVENT
Follow Up Phone Call    Outgoing phone call    Spoke to: Jayson Elizalde Relationship:self   Phone number: [phone]      Outcome: I left a message on answering machine   Chief Complaint   Patient presents with    Shortness of Breath     Pt reports shortness of breath for the past 2 weeks. Pt had a needle biopsy performed 2 weeks ago, that initially caused a partially collapsed lung on the right side. Today, Pt saw a physician who re-scanned Pt and saw that Pt's Right lung is still partially collapsed. Pt denies any CP. Pt has hx of COPD.          Diagnosis:Not applicable

## 2024-08-29 NOTE — PROGRESS NOTES
Unable to reach patient for a F/U call to review medications in home per yesterday conversation    LVM with call back number for patient to call if needed   If no voicemail available call attempts x 2 were made to contact the patient to assist with any questions or concerns patient may have.     L/M Encouraged patient to call providers for any questions concerns or change in condition

## 2024-09-04 DIAGNOSIS — J93.9 PNEUMOTHORAX, UNSPECIFIED TYPE: ICD-10-CM

## 2024-09-05 ENCOUNTER — APPOINTMENT (OUTPATIENT)
Dept: PRIMARY CARE | Facility: CLINIC | Age: 84
End: 2024-09-05
Payer: MEDICARE

## 2024-09-05 VITALS
TEMPERATURE: 98.9 F | BODY MASS INDEX: 29.82 KG/M2 | SYSTOLIC BLOOD PRESSURE: 132 MMHG | OXYGEN SATURATION: 92 % | WEIGHT: 190 LBS | HEART RATE: 62 BPM | HEIGHT: 67 IN | DIASTOLIC BLOOD PRESSURE: 74 MMHG

## 2024-09-05 DIAGNOSIS — Z09 HOSPITAL DISCHARGE FOLLOW-UP: Primary | ICD-10-CM

## 2024-09-05 PROCEDURE — 3075F SYST BP GE 130 - 139MM HG: CPT | Performed by: FAMILY MEDICINE

## 2024-09-05 PROCEDURE — 1126F AMNT PAIN NOTED NONE PRSNT: CPT | Performed by: FAMILY MEDICINE

## 2024-09-05 PROCEDURE — 1036F TOBACCO NON-USER: CPT | Performed by: FAMILY MEDICINE

## 2024-09-05 PROCEDURE — 99495 TRANSJ CARE MGMT MOD F2F 14D: CPT | Performed by: FAMILY MEDICINE

## 2024-09-05 PROCEDURE — 3078F DIAST BP <80 MM HG: CPT | Performed by: FAMILY MEDICINE

## 2024-09-05 PROCEDURE — 1159F MED LIST DOCD IN RCRD: CPT | Performed by: FAMILY MEDICINE

## 2024-09-05 ASSESSMENT — ENCOUNTER SYMPTOMS
CHILLS: 0
PALPITATIONS: 0
DEPRESSION: 0
OCCASIONAL FEELINGS OF UNSTEADINESS: 0
FATIGUE: 0
FEVER: 0
COUGH: 0
LIGHT-HEADEDNESS: 0
DIZZINESS: 0
SHORTNESS OF BREATH: 0
LOSS OF SENSATION IN FEET: 0

## 2024-09-05 ASSESSMENT — PAIN SCALES - GENERAL: PAINLEVEL: 0-NO PAIN

## 2024-09-05 ASSESSMENT — COLUMBIA-SUICIDE SEVERITY RATING SCALE - C-SSRS
1. IN THE PAST MONTH, HAVE YOU WISHED YOU WERE DEAD OR WISHED YOU COULD GO TO SLEEP AND NOT WAKE UP?: NO
2. HAVE YOU ACTUALLY HAD ANY THOUGHTS OF KILLING YOURSELF?: NO
6. HAVE YOU EVER DONE ANYTHING, STARTED TO DO ANYTHING, OR PREPARED TO DO ANYTHING TO END YOUR LIFE?: NO

## 2024-09-05 NOTE — PROGRESS NOTES
I reviewed and examined the patient. I was present for the key exam elements, and I fully participated in the patient's care. I discussed the management of the care with the resident. I have personally reviewed the pertinent labs and imaging, as well as recent notes, with the patient. I have reviewed the note above and agree with the resident's medical decision making as documented in the resident's note, in addition to the following comments / findings:     Agree with the rest of the plan outlined below by resident physician. No red flags.      The patient understands and agrees to the assessment and plan of care. Patient has also agreed to follow up and comply with the treatment and evaluation as recommended today. Patient was instructed to call the office at [phone] should questions arise regarding their treatment or care.     Liborio Vincent DO, FAOASM  Family Medicine   [address], [address]  [place] [address]     Liborio Vincent DO

## 2024-09-05 NOTE — PROGRESS NOTES
"Patient: Jayson Elizalde  : 1940  PCP: Liborio Vincent DO  MRN: [id number]  Program: Transitional Care Management  Status: Enrolled  Effective Dates: 2024 - present  Responsible Staff: Tara Harrell  Social Determinants to be Addressed: Physical Activity, Social Connections, Stress, Tobacco Use         Jayson Elizalde is a 84 y.o. male presenting today for follow-up after being discharged from the hospital 10 days ago. The main problem requiring admission was Pneumothorax after biopsy. The discharge summary and/or Transitional Care Management documentation was reviewed. Medication reconciliation was performed as indicated via the \"Rafa as Reviewed\" timestamp.     Jayson Elizalde was contacted by Transitional Care Management services two days after his discharge. This encounter and supporting documentation was reviewed.    Review of Systems   Constitutional:  Negative for chills, fatigue and fever.   Respiratory:  Negative for cough and shortness of breath.    Cardiovascular:  Negative for chest pain and palpitations.   Neurological:  Negative for dizziness, syncope and light-headedness.       /74   Pulse 62   Temp 37.2 °C (98.9 °F) (Temporal)   Ht 1.702 m (5' 7\")   Wt 86.2 kg (190 lb)   SpO2 92%   BMI 29.76 kg/m²     Physical Exam  Constitutional:       Appearance: Normal appearance. He is obese.   HENT:      Head: Normocephalic and atraumatic.      Nose: Nose normal.      Mouth/Throat:      Mouth: Mucous membranes are moist.      Pharynx: Oropharynx is clear.   Eyes:      Extraocular Movements: Extraocular movements intact.      Conjunctiva/sclera: Conjunctivae normal.   Cardiovascular:      Rate and Rhythm: Normal rate and regular rhythm.      Heart sounds: Normal heart sounds.   Pulmonary:      Breath sounds: Normal breath sounds.   Musculoskeletal:      Right lower leg: No edema.      Left lower leg: No edema.   Skin:     General: Skin is warm and dry.      Capillary Refill: Capillary " refill takes less than 2 seconds.   Neurological:      General: No focal deficit present.      Mental Status: He is alert and oriented to person, place, and time. Mental status is at baseline.   Psychiatric:         Mood and Affect: Mood normal.         Behavior: Behavior normal.         Thought Content: Thought content normal.         Judgment: Judgment normal.         The complexity of medical decision making for this patient's transitional care is moderate.    Assessment/Plan   Problem List Items Addressed This Visit    None  Visit Diagnoses         Codes    Hospital discharge follow-up    -  Primary Z09          Pt is an 85 yo M who is presenting to the office for a hospital discharge follow up. Reviewed the hospital course and discussed the discharge medications. No changes to his medications were made at this visit. Patient was told to follow up with the pulmonologist and follow up with PCP as needed. Patient agreed with treatment and plan.    Patient was staffed with Norma Ch DO, PGY-3  [facility]

## 2024-09-06 ENCOUNTER — PATIENT OUTREACH (OUTPATIENT)
Dept: PRIMARY CARE | Facility: CLINIC | Age: 84
End: 2024-09-06
Payer: MEDICARE

## 2024-09-06 NOTE — PROGRESS NOTES
Call regarding appt. with PCP on 9/5/2024  after hospitalization, I spoke with Lisbet, states next week will see Onc, Card  Surg F/U along with pulmonary      At time of outreach call  Lisbet feels as if Bill's  condition has improved  since last visit.    Reviewed the PCP appointment with the pt and addressed any questions or concerns.       Encouraged  Lisbet to call providers for any questions concerns or change in condition

## 2024-09-10 ENCOUNTER — OFFICE VISIT (OUTPATIENT)
Dept: CARDIAC SURGERY | Facility: CLINIC | Age: 84
End: 2024-09-10
Payer: MEDICARE

## 2024-09-10 ENCOUNTER — PATIENT OUTREACH (OUTPATIENT)
Dept: HEMATOLOGY/ONCOLOGY | Facility: HOSPITAL | Age: 84
End: 2024-09-10
Payer: MEDICARE

## 2024-09-10 ENCOUNTER — HOSPITAL ENCOUNTER (OUTPATIENT)
Dept: RADIOLOGY | Facility: HOSPITAL | Age: 84
Discharge: HOME | End: 2024-09-10
Payer: MEDICARE

## 2024-09-10 VITALS
SYSTOLIC BLOOD PRESSURE: 102 MMHG | OXYGEN SATURATION: 98 % | BODY MASS INDEX: 30.31 KG/M2 | HEART RATE: 93 BPM | WEIGHT: 200 LBS | RESPIRATION RATE: 18 BRPM | HEIGHT: 68 IN | DIASTOLIC BLOOD PRESSURE: 65 MMHG

## 2024-09-10 DIAGNOSIS — J93.9 PNEUMOTHORAX, UNSPECIFIED TYPE: ICD-10-CM

## 2024-09-10 DIAGNOSIS — C34.90 PRIMARY ADENOCARCINOMA OF LUNG, UNSPECIFIED LATERALITY (MULTI): ICD-10-CM

## 2024-09-10 PROCEDURE — 3074F SYST BP LT 130 MM HG: CPT | Performed by: THORACIC SURGERY (CARDIOTHORACIC VASCULAR SURGERY)

## 2024-09-10 PROCEDURE — 1125F AMNT PAIN NOTED PAIN PRSNT: CPT | Performed by: THORACIC SURGERY (CARDIOTHORACIC VASCULAR SURGERY)

## 2024-09-10 PROCEDURE — 3078F DIAST BP <80 MM HG: CPT | Performed by: THORACIC SURGERY (CARDIOTHORACIC VASCULAR SURGERY)

## 2024-09-10 PROCEDURE — 71046 X-RAY EXAM CHEST 2 VIEWS: CPT | Performed by: RADIOLOGY

## 2024-09-10 PROCEDURE — 99205 OFFICE O/P NEW HI 60 MIN: CPT | Performed by: THORACIC SURGERY (CARDIOTHORACIC VASCULAR SURGERY)

## 2024-09-10 PROCEDURE — 1159F MED LIST DOCD IN RCRD: CPT | Performed by: THORACIC SURGERY (CARDIOTHORACIC VASCULAR SURGERY)

## 2024-09-10 PROCEDURE — 1036F TOBACCO NON-USER: CPT | Performed by: THORACIC SURGERY (CARDIOTHORACIC VASCULAR SURGERY)

## 2024-09-10 PROCEDURE — 99215 OFFICE O/P EST HI 40 MIN: CPT | Performed by: THORACIC SURGERY (CARDIOTHORACIC VASCULAR SURGERY)

## 2024-09-10 PROCEDURE — 71046 X-RAY EXAM CHEST 2 VIEWS: CPT

## 2024-09-10 ASSESSMENT — LIFESTYLE VARIABLES
SKIP TO QUESTIONS 9-10: 1
HOW OFTEN DO YOU HAVE A DRINK CONTAINING ALCOHOL: 4 OR MORE TIMES A WEEK
HOW OFTEN DO YOU HAVE SIX OR MORE DRINKS ON ONE OCCASION: NEVER
AUDIT-C TOTAL SCORE: 4
HOW MANY STANDARD DRINKS CONTAINING ALCOHOL DO YOU HAVE ON A TYPICAL DAY: 1 OR 2

## 2024-09-10 ASSESSMENT — ENCOUNTER SYMPTOMS
OCCASIONAL FEELINGS OF UNSTEADINESS: 0
LOSS OF SENSATION IN FEET: 0
DEPRESSION: 0

## 2024-09-10 ASSESSMENT — PAIN SCALES - GENERAL: PAINLEVEL: 2

## 2024-09-10 NOTE — PROGRESS NOTES
This RN called to perform patient outreach since patient is new to Dr. Ye's office. No answer at this time. RN left message with callback number.

## 2024-09-11 ENCOUNTER — OFFICE VISIT (OUTPATIENT)
Dept: PULMONOLOGY | Facility: CLINIC | Age: 84
End: 2024-09-11
Payer: MEDICARE

## 2024-09-11 ENCOUNTER — LAB (OUTPATIENT)
Dept: LAB | Facility: LAB | Age: 84
End: 2024-09-11
Payer: MEDICARE

## 2024-09-11 ENCOUNTER — APPOINTMENT (OUTPATIENT)
Dept: HEMATOLOGY/ONCOLOGY | Facility: HOSPITAL | Age: 84
End: 2024-09-11
Payer: MEDICARE

## 2024-09-11 VITALS
BODY MASS INDEX: 30.49 KG/M2 | WEIGHT: 200.5 LBS | OXYGEN SATURATION: 96 % | DIASTOLIC BLOOD PRESSURE: 80 MMHG | HEART RATE: 71 BPM | RESPIRATION RATE: 16 BRPM | SYSTOLIC BLOOD PRESSURE: 139 MMHG

## 2024-09-11 DIAGNOSIS — J44.9 CHRONIC OBSTRUCTIVE PULMONARY DISEASE, UNSPECIFIED COPD TYPE (MULTI): ICD-10-CM

## 2024-09-11 DIAGNOSIS — J95.811 POSTPROCEDURAL PNEUMOTHORAX: Primary | ICD-10-CM

## 2024-09-11 DIAGNOSIS — C34.91 ADENOCARCINOMA OF RIGHT LUNG (MULTI): ICD-10-CM

## 2024-09-11 DIAGNOSIS — J43.9 PULMONARY EMPHYSEMA, UNSPECIFIED EMPHYSEMA TYPE (MULTI): ICD-10-CM

## 2024-09-11 DIAGNOSIS — R06.2 WHEEZE: ICD-10-CM

## 2024-09-11 PROBLEM — J93.9 PNEUMOTHORAX: Status: RESOLVED | Noted: 2024-08-26 | Resolved: 2024-09-11

## 2024-09-11 PROBLEM — C34.90: Status: ACTIVE | Noted: 2024-09-10

## 2024-09-11 PROCEDURE — 82785 ASSAY OF IGE: CPT

## 2024-09-11 PROCEDURE — 99213 OFFICE O/P EST LOW 20 MIN: CPT | Performed by: INTERNAL MEDICINE

## 2024-09-11 PROCEDURE — 3079F DIAST BP 80-89 MM HG: CPT | Performed by: INTERNAL MEDICINE

## 2024-09-11 PROCEDURE — 1126F AMNT PAIN NOTED NONE PRSNT: CPT | Performed by: INTERNAL MEDICINE

## 2024-09-11 PROCEDURE — 36415 COLL VENOUS BLD VENIPUNCTURE: CPT

## 2024-09-11 PROCEDURE — 1036F TOBACCO NON-USER: CPT | Performed by: INTERNAL MEDICINE

## 2024-09-11 PROCEDURE — 1159F MED LIST DOCD IN RCRD: CPT | Performed by: INTERNAL MEDICINE

## 2024-09-11 PROCEDURE — 86003 ALLG SPEC IGE CRUDE XTRC EA: CPT

## 2024-09-11 PROCEDURE — 1160F RVW MEDS BY RX/DR IN RCRD: CPT | Performed by: INTERNAL MEDICINE

## 2024-09-11 PROCEDURE — 3075F SYST BP GE 130 - 139MM HG: CPT | Performed by: INTERNAL MEDICINE

## 2024-09-11 ASSESSMENT — ENCOUNTER SYMPTOMS
PSYCHIATRIC NEGATIVE: 1
APPETITE CHANGE: 0
HEMATOLOGIC/LYMPHATIC NEGATIVE: 1
VOMITING: 0
UNEXPECTED WEIGHT CHANGE: 0
GASTROINTESTINAL NEGATIVE: 1
FEVER: 0
NAUSEA: 0
PALPITATIONS: 0
STRIDOR: 0
NEUROLOGICAL NEGATIVE: 1
RESPIRATORY NEGATIVE: 1
MUSCULOSKELETAL NEGATIVE: 1
DIAPHORESIS: 0
ENDOCRINE NEGATIVE: 1
FATIGUE: 0
ABDOMINAL PAIN: 0
CONSTIPATION: 0
CHILLS: 0
CHILLS: 0
PSYCHIATRIC NEGATIVE: 1
WHEEZING: 0
COUGH: 0
ALLERGIC/IMMUNOLOGIC NEGATIVE: 1
CHOKING: 0
CHEST TIGHTNESS: 0
SHORTNESS OF BREATH: 0
DEPRESSION: 0
EYES NEGATIVE: 1
DIARRHEA: 0
FEVER: 0
NEUROLOGICAL NEGATIVE: 1
CARDIOVASCULAR NEGATIVE: 1
ABDOMINAL DISTENTION: 0
COUGH: 0

## 2024-09-11 ASSESSMENT — PAIN SCALES - GENERAL: PAINLEVEL: 0-NO PAIN

## 2024-09-11 NOTE — PROGRESS NOTES
Subjective   Patient ID: Jayson Elizalde is a 84 y.o. male who presents for Consult (Follow up from pneumothorax after bx).  HPI    84-year-old male with a history of heavy smoking who quit 40 years ago and who was found to have a right lower lobe lung nodule.  He underwent a PET scan showing an hypermetabolic lesion in the right lower lobe suspicious for malignancy and no other evidence of lymphadenopathy or metastatic disease.  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  He is biopsy was complicated by a pneumothorax that required admission to the hospital.  He underwent PFTs as well showing an FEV1 of 46% of predicted and a DLCO of 79% of predicted.  I had a discussion with him about the next steps.  He is a very active man who does not endorse any shortness of breath.  Also, the location of this nodule is favorable for a sublobar resection.  We had a discussion about the mechanics of a robotic lung resection with lymphadenectomy.  We discussed the expected recovery potential complications.  We will proceed on September 25 at [facility] [place].    Review of Systems   Constitutional:  Negative for appetite change, chills, diaphoresis, fatigue, fever and unexpected weight change.   HENT: Negative.     Eyes: Negative.    Respiratory:  Negative for cough, choking, chest tightness, shortness of breath, wheezing and stridor.    Cardiovascular:  Negative for chest pain, palpitations and leg swelling.   Gastrointestinal:  Negative for abdominal distention, abdominal pain, constipation, diarrhea, nausea and vomiting.   Endocrine: Negative.    Genitourinary: Negative.    Musculoskeletal: Negative.    Skin: Negative.    Allergic/Immunologic: Negative.    Neurological: Negative.    Hematological: Negative.    Psychiatric/Behavioral: Negative.     All other systems reviewed and are negative.      Objective   Physical Exam  Constitutional:       Appearance: Normal appearance.   HENT:      Head: Normocephalic and  atraumatic.      Nose: Nose normal.      Mouth/Throat:      Mouth: Mucous membranes are moist.      Pharynx: Oropharynx is clear.   Eyes:      Extraocular Movements: Extraocular movements intact.      Conjunctiva/sclera: Conjunctivae normal.      Pupils: Pupils are equal, round, and reactive to light.   Cardiovascular:      Rate and Rhythm: Normal rate and regular rhythm.      Pulses: Normal pulses.      Heart sounds: Normal heart sounds.   Pulmonary:      Effort: Pulmonary effort is normal. No respiratory distress.      Breath sounds: Normal breath sounds. No stridor. No wheezing, rhonchi or rales.   Chest:      Chest wall: No tenderness.   Abdominal:      General: Abdomen is flat. Bowel sounds are normal.      Palpations: Abdomen is soft.   Musculoskeletal:         General: Normal range of motion.      Cervical back: Normal range of motion and neck supple.   Skin:     General: Skin is warm and dry.      Capillary Refill: Capillary refill takes less than 2 seconds.   Neurological:      General: No focal deficit present.      Mental Status: He is alert and oriented to person, place, and time.         Assessment/Plan   Diagnoses and all orders for this visit:  Primary adenocarcinoma of lung, unspecified laterality (Multi)  -     Referral to Thoracic Surgery  -     Case Request Operating Room: Resection Robot-Assisted Lung Segment  -     Request for Pre-Admission Testing Visit; Future  -     Basic Metabolic Panel; Future  -     Type And Screen; Future           Cliff Pleitez MD 09/11/24 12:07 PM

## 2024-09-11 NOTE — H&P (VIEW-ONLY)
Subjective   Patient ID: Jayson Elizalde is a 84 y.o. male who presents for Consult (Follow up from pneumothorax after bx).  HPI    84-year-old male with a history of heavy smoking who quit 40 years ago and who was found to have a right lower lobe lung nodule.  He underwent a PET scan showing an hypermetabolic lesion in the right lower lobe suspicious for malignancy and no other evidence of lymphadenopathy or metastatic disease.  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  He is biopsy was complicated by a pneumothorax that required admission to the hospital.  He underwent PFTs as well showing an FEV1 of 46% of predicted and a DLCO of 79% of predicted.  I had a discussion with him about the next steps.  He is a very active man who does not endorse any shortness of breath.  Also, the location of this nodule is favorable for a sublobar resection.  We had a discussion about the mechanics of a robotic lung resection with lymphadenectomy.  We discussed the expected recovery potential complications.  [place] [facility] [place]    Review of Systems   Constitutional:  Negative for appetite change, chills, diaphoresis, fatigue, fever and unexpected weight change.   HENT: Negative.     Eyes: Negative.    Respiratory:  Negative for cough, choking, chest tightness, shortness of breath, wheezing and stridor.    Cardiovascular:  Negative for chest pain, palpitations and leg swelling.   Gastrointestinal:  Negative for abdominal distention, abdominal pain, constipation, diarrhea, nausea and vomiting.   Endocrine: Negative.    Genitourinary: Negative.    Musculoskeletal: Negative.    Skin: Negative.    Allergic/Immunologic: Negative.    Neurological: Negative.    Hematological: Negative.    Psychiatric/Behavioral: Negative.     All other systems reviewed and are negative.      Objective   Physical Exam  Constitutional:       Appearance: Normal appearance.   HENT:      Head: Normocephalic and  atraumatic.      Nose: Nose normal.      Mouth/Throat:      Mouth: Mucous membranes are moist.      Pharynx: Oropharynx is clear.   Eyes:      Extraocular Movements: Extraocular movements intact.      Conjunctiva/sclera: Conjunctivae normal.      Pupils: Pupils are equal, round, and reactive to light.   Cardiovascular:      Rate and Rhythm: Normal rate and regular rhythm.      Pulses: Normal pulses.      Heart sounds: Normal heart sounds.   Pulmonary:      Effort: Pulmonary effort is normal. No respiratory distress.      Breath sounds: Normal breath sounds. No stridor. No wheezing, rhonchi or rales.   Chest:      Chest wall: No tenderness.   Abdominal:      General: Abdomen is flat. Bowel sounds are normal.      Palpations: Abdomen is soft.   Musculoskeletal:         General: Normal range of motion.      Cervical back: Normal range of motion and neck supple.   Skin:     General: Skin is warm and dry.      Capillary Refill: Capillary refill takes less than 2 seconds.   Neurological:      General: No focal deficit present.      Mental Status: He is alert and oriented to person, place, and time.         Assessment/Plan   Diagnoses and all orders for this visit:  Primary adenocarcinoma of lung, unspecified laterality (Multi)  -     Referral to Thoracic Surgery  -     Case Request Operating Room: Resection Robot-Assisted Lung Segment  -     Request for Pre-Admission Testing Visit; Future  -     Basic Metabolic Panel; Future  -     Type And Screen; Future           Cliff Pleitez MD 09/11/24 12:07 PM

## 2024-09-11 NOTE — PROGRESS NOTES
Subjective   Patient ID: Jayson Elizalde is a 84 y.o. male who presents for Lung Eval.  H/o HTN here for follow-up of lung nodule.   Mod obstruction + BD (7/2024).  CT-guided lung bx consistent with likely adenocarcinoma.  Had residual PTX post procedure.   Was admitted for chest tube for his PTX and doing well since then.  Saw thoracic surgery and planning for resection.   No fevers, chills, cough.  Using Trelegy and thinks it's helping.  Has not needed a rescue.  ET is about 150ft.        Review of Systems   Constitutional:  Negative for chills and fever.   Respiratory: Negative.  Negative for cough.    Cardiovascular: Negative.    Gastrointestinal: Negative.    Skin:  Negative for rash.   Neurological: Negative.    Psychiatric/Behavioral: Negative.     All other systems reviewed and are negative.      Objective   Physical Exam  Vitals reviewed.   Constitutional:       Appearance: Normal appearance.   HENT:      Head: Normocephalic and atraumatic.   Eyes:      Extraocular Movements: Extraocular movements intact.   Cardiovascular:      Rate and Rhythm: Normal rate and regular rhythm.      Heart sounds: Normal heart sounds.   Pulmonary:      Effort: Pulmonary effort is normal.      Breath sounds: Normal breath sounds.   Abdominal:      Palpations: Abdomen is soft.      Tenderness: There is no abdominal tenderness.   Musculoskeletal:      Cervical back: Normal range of motion.   Skin:     General: Skin is warm.   Neurological:      General: No focal deficit present.      Mental Status: He is alert and oriented to person, place, and time. Mental status is at baseline.   Psychiatric:         Mood and Affect: Mood normal.         Behavior: Behavior normal.         Assessment/Plan   Problem List Items Addressed This Visit       Adenocarcinoma, lung (Multi)     Ct-guided lung bx consistent w/ adeno.  Seen by thoracic w/ plan for resection.         Pulmonary emphysema (Multi)     Mod obstruction + BD 7/2024.  Cont Trelegy  and albuterol.  [place] referral.  Check RAST         RESOLVED: Pneumothorax - Primary     2/2 CT-guided lung biopsy.  Resolved after admission for a chest tube.  Repeat CXR yesterday showed no residual.          RTC in January as previously scheduled.    Time Spent  Prep time on day of patient encounter: 5 minutes  Time spent directly with patient, family or caregiver: 10 minutes  Additional Time Spent on Patient Care Activities: 0 minutes  Documentation Time: 5 minutes  Other Time Spent: 0 minutes  Total: 20 minutes        Behzad Jordan MD 09/11/24 9:55 AM

## 2024-09-11 NOTE — ASSESSMENT & PLAN NOTE
2/2 CT-guided lung biopsy.  Resolved after admission for a chest tube.  Repeat CXR yesterday showed no residual.

## 2024-09-12 ENCOUNTER — APPOINTMENT (OUTPATIENT)
Dept: PREADMISSION TESTING | Facility: HOSPITAL | Age: 84
End: 2024-09-12
Payer: MEDICARE

## 2024-09-12 DIAGNOSIS — Z09 S/P LUNG SURGERY, FOLLOW-UP EXAM: ICD-10-CM

## 2024-09-13 LAB

## 2024-09-16 ENCOUNTER — PRE-ADMISSION TESTING (OUTPATIENT)
Dept: PREADMISSION TESTING | Facility: HOSPITAL | Age: 84
End: 2024-09-16
Payer: MEDICARE

## 2024-09-16 ENCOUNTER — HOSPITAL ENCOUNTER (OUTPATIENT)
Dept: RADIOLOGY | Facility: HOSPITAL | Age: 84
Discharge: HOME | End: 2024-09-16
Payer: MEDICARE

## 2024-09-16 VITALS
TEMPERATURE: 97.4 F | HEIGHT: 67 IN | RESPIRATION RATE: 16 BRPM | HEART RATE: 60 BPM | WEIGHT: 201 LBS | BODY MASS INDEX: 31.55 KG/M2 | DIASTOLIC BLOOD PRESSURE: 82 MMHG | SYSTOLIC BLOOD PRESSURE: 158 MMHG

## 2024-09-16 DIAGNOSIS — Z09 S/P LUNG SURGERY, FOLLOW-UP EXAM: ICD-10-CM

## 2024-09-16 DIAGNOSIS — C34.90 PRIMARY ADENOCARCINOMA OF LUNG, UNSPECIFIED LATERALITY (MULTI): Primary | ICD-10-CM

## 2024-09-16 LAB
ABO GROUP (TYPE) IN BLOOD: NORMAL
ANION GAP SERPL CALC-SCNC: 14 MMOL/L (ref 10–20)
ANTIBODY SCREEN: NORMAL
BUN SERPL-MCNC: 13 MG/DL (ref 6–23)
CALCIUM SERPL-MCNC: 9.5 MG/DL (ref 8.6–10.6)
CHLORIDE SERPL-SCNC: 102 MMOL/L (ref 98–107)
CO2 SERPL-SCNC: 29 MMOL/L (ref 21–32)
CREAT SERPL-MCNC: 0.81 MG/DL (ref 0.5–1.3)
EGFRCR SERPLBLD CKD-EPI 2021: 88 ML/MIN/1.73M*2
ERYTHROCYTE [DISTWIDTH] IN BLOOD BY AUTOMATED COUNT: 13.7 % (ref 11.5–14.5)
GLUCOSE SERPL-MCNC: 103 MG/DL (ref 74–99)
HCT VFR BLD AUTO: 42 % (ref 41–52)
HGB BLD-MCNC: 13.9 G/DL (ref 13.5–17.5)
MCH RBC QN AUTO: 29.8 PG (ref 26–34)
MCHC RBC AUTO-ENTMCNC: 33.1 G/DL (ref 32–36)
MCV RBC AUTO: 90 FL (ref 80–100)
NRBC BLD-RTO: 0 /100 WBCS (ref 0–0)
PLATELET # BLD AUTO: 189 X10*3/UL (ref 150–450)
POTASSIUM SERPL-SCNC: 4.5 MMOL/L (ref 3.5–5.3)
RBC # BLD AUTO: 4.66 X10*6/UL (ref 4.5–5.9)
RH FACTOR (ANTIGEN D): NORMAL
SODIUM SERPL-SCNC: 140 MMOL/L (ref 136–145)
WBC # BLD AUTO: 5.5 X10*3/UL (ref 4.4–11.3)

## 2024-09-16 PROCEDURE — 71046 X-RAY EXAM CHEST 2 VIEWS: CPT | Performed by: RADIOLOGY

## 2024-09-16 PROCEDURE — 80048 BASIC METABOLIC PNL TOTAL CA: CPT

## 2024-09-16 PROCEDURE — 71046 X-RAY EXAM CHEST 2 VIEWS: CPT

## 2024-09-16 PROCEDURE — 36415 COLL VENOUS BLD VENIPUNCTURE: CPT

## 2024-09-16 PROCEDURE — 86901 BLOOD TYPING SEROLOGIC RH(D): CPT

## 2024-09-16 PROCEDURE — 87081 CULTURE SCREEN ONLY: CPT

## 2024-09-16 PROCEDURE — 85027 COMPLETE CBC AUTOMATED: CPT

## 2024-09-16 PROCEDURE — 99204 OFFICE O/P NEW MOD 45 MIN: CPT | Performed by: NURSE PRACTITIONER

## 2024-09-16 RX ORDER — CHLORHEXIDINE GLUCONATE ORAL RINSE 1.2 MG/ML
15 SOLUTION DENTAL AS NEEDED
Qty: 473 ML | Refills: 0 | Status: SHIPPED | OUTPATIENT
Start: 2024-09-16 | End: 2024-09-18

## 2024-09-16 RX ORDER — CHLORHEXIDINE GLUCONATE 40 MG/ML
SOLUTION TOPICAL DAILY PRN
Qty: 473 ML | Refills: 0 | Status: SHIPPED | OUTPATIENT
Start: 2024-09-16 | End: 2024-09-21

## 2024-09-16 ASSESSMENT — DUKE ACTIVITY SCORE INDEX (DASI)
CAN YOU DO LIGHT WORK AROUND THE HOUSE LIKE DUSTING OR WASHING DISHES: YES
CAN YOU PARTICIPATE IN STRENOUS SPORTS LIKE SWIMMING, SINGLES TENNIS, FOOTBALL, BASKETBALL, OR SKIING: NO
CAN YOU DO HEAVY WORK AROUND THE HOUSE LIKE SCRUBBING FLOORS OR LIFTING AND MOVING HEAVY FURNITURE: NO
TOTAL_SCORE: 42.7
CAN YOU WALK A BLOCK OR TWO ON LEVEL GROUND: YES
DASI METS SCORE: 8
CAN YOU WALK INDOORS, SUCH AS AROUND YOUR HOUSE: YES
CAN YOU PARTICIPATE IN MODERATE RECREATIONAL ACTIVITIES LIKE GOLF, BOWLING, DANCING, DOUBLES TENNIS OR THROWING A BASEBALL OR FOOTBALL: YES
CAN YOU DO YARD WORK LIKE RAKING LEAVES, WEEDING OR PUSHING A MOWER: YES
CAN YOU HAVE SEXUAL RELATIONS: YES
CAN YOU TAKE CARE OF YOURSELF (EAT, DRESS, BATHE, OR USE TOILET): YES
CAN YOU RUN A SHORT DISTANCE: YES
CAN YOU DO MODERATE WORK AROUND THE HOUSE LIKE VACUUMING, SWEEPING FLOORS OR CARRYING GROCERIES: YES
CAN YOU CLIMB A FLIGHT OF STAIRS OR WALK UP A HILL: YES

## 2024-09-16 ASSESSMENT — ENCOUNTER SYMPTOMS
CONSTITUTIONAL NEGATIVE: 1
GASTROINTESTINAL NEGATIVE: 1
SHORTNESS OF BREATH: 1
BRUISES/BLEEDS EASILY: 1
NECK NEGATIVE: 1
MUSCULOSKELETAL NEGATIVE: 1
NEUROLOGICAL NEGATIVE: 1

## 2024-09-16 ASSESSMENT — LIFESTYLE VARIABLES: SMOKING_STATUS: NONSMOKER

## 2024-09-16 NOTE — PREPROCEDURE INSTRUCTIONS
Fasting Guidelines    NPO Instructions:    Do not eat any food after midnight the night before your surgery/procedure.  You may have up to TEN ounces of clear liquids until TWO hours before your instructed arrival time to the hospital. This includes water, black tea/coffee, (no milk or cream), apple juice, and/or electrolyte drinks (Gatorade).  You may chew gum up to TWO hours before your surgery/procedure.    Additional Instructions:     We have sent a prescription for Hibiclens soap and Peridex mouth wash to your preferred pharmacy.  If you have not already, Please  your prescription and start using as directed before surgery.  Follow the instruction sheet provided to you at your CPM/PAT appointment.    Avoid herbal supplements, multivitamins and NSAIDS (non-steroidal anti-inflammatory drugs) such as Advil, Aleve, Ibuprofen, Naproxen, Excedrin, Meloxicam or Celebrex for at least 7 days prior to surgery. May take Tylenol as needed.    Avoid tobacco and alcohol products for 24 hours prior to surgery.    CONTACT SURGEON'S OFFICE IF YOU DEVELOP:  * Fever = 100.4 F   * New respiratory symptoms (e.g. cough, shortness of breath, respiratory distress, sore throat)  * Recent loss of taste or smell  *Flu like symptoms such as headache, fatigue or gastrointestinal symptoms  * You develop any open sores, shingles, burning or painful urination   AND/OR:  * You no longer wish to have the surgery.  * Any other personal circumstances change that may lead to the need to cancel or defer this surgery.  *You were admitted to any hospital within one week of your planned procedure.    Seven/Six Days before Surgery:  Review your medication instructions, stop indicated medications    Day of Surgery:  Review your medication instructions, take indicated medications  Wear comfortable loose fitting clothing  Do not use moisturizers, creams, lotions or perfume  All jewelry and valuables should be left at home      [facility]  [phone]           Patient Information: Pre-Operative Infection Prevention Measures     Why did I have my nose, under my arms, and groin swabbed?  The purpose of the swab is to identify Staphylococcus aureus inside your nose or on your skin.  The swab was sent to the laboratory for culture.  A positive swab/culture for Staphylococcus aureus is called colonization or carriage.      What is Staphylococcus aureus?  Staphylococcus aureus, also known as “staph”, is a germ found on the skin or in the nose of healthy people.  Sometimes Staphylococcus aureus can get into the body and cause an infection.  This can be minor (such as pimples, boils, or other skin problems).  It might also be serious (such as a blood infection, pneumonia, or a surgical site infection).    What is Staphylococcus aureus colonization or carriage?  Colonization or carriage means that a person has the germ but is not sick from it.  These bacteria can be spread on the hands or when breathing or sneezing.    How is Staphylococcus aureus spread?  It is most often spread by close contact with a person or item that carries it.    What happens if my culture is positive for Staphylococcus aureus?  Your doctor/medical team will use this information to guide any antibiotic treatment which may be necessary.  Regardless of the culture results, we will clean the inside of your nose with a betadine swab just before you have your surgery.      Will I get an infection if I have Staphylococcus aureus in my nose or on my skin?  Anyone can get an infection with Staphylococcus aureus.  However, the best way to reduce your risk of infection is to follow the instructions provided to you for the use of your CHG soap and dental rinse.        Patient Information: Oral/Dental Rinse    What is oral/dental rinse?   It is a mouthwash. It is a way of cleaning the mouth with a germ-killing solution before your surgery.  The solution contains  chlorhexidine, commonly known as CHG.   It is used inside the mouth to kill a bacteria known as Staphylococcus aureus.  Let your doctor know if you are allergic to Chlorhexidine.    Why do I need to use CHG oral/dental rinse?  The CHG oral/dental rinse helps to kill a bacteria in your mouth known as Staphylococcus aureus.     This reduces the risk of infection at the surgical site.      Using your CHG oral/dental rinse  STEPS:  Use your CHG oral/dental rinse after you brush your teeth the night before (at bedtime) and the morning of your surgery.  Follow all directions on your prescription label.    Use the cap on the container to measure 15ml   Swish (gargle if you can) the mouthwash in your mouth for at least 30 seconds, (do not swallow) and spit out  After you use your CHG rinse, do not rinse your mouth with water, drink or eat.  Please refer to the prescription label for the appropriate time to resume oral intake      What side effects might I have using the CHG oral/dental rinse?  CHG rinse will stick to plaque on the teeth.  Brush and floss just before use.  Teeth brushing will help avoid staining of plaque during use.      Patient Information: Home Preoperative Antibacterial Shower      What is a home preoperative antibacterial shower?  This shower is a way of cleaning the skin with a germ-killing solution before surgery.  The solution contains chlorhexidine, commonly known as CHG.  CHG is a skin cleanser with germ-killing ability.  Let your doctor know if you are allergic to chlorhexidine.    Why do I need to take a preoperative antibacterial shower?  Skin is not sterile.  It is best to try to make your skin as free of germs as possible before surgery.  Proper cleansing with a germ-killing soap before surgery can lower the number of germs on your skin.  This helps to reduce the risk of infection at the surgical site.  Following the instructions listed below will help you prepare your skin for surgery.       How do I use the solution?  Steps:  Begin using your CHG soap 5 days before your scheduled surgery on ________________________.    First, wash and rinse your hair using the CHG soap. Keep CHG soap away from ear canals and eyes.  Rinse completely, do not condition.  Hair extensions should be removed.  Wash your face with your normal soap and rinse.    Apply the CHG solution to a clean wet washcloth.  Turn the water off or move away from the water spray to avoid premature rinsing of the CHG soap as you are applying.   Firmly lather your entire body from the neck down.  Do not use on your face.  Pay special attention to the area(s) where your incision(s) will be located unless they are on your face.  Avoid scrubbing your skin too hard.  The important point is to have the CHG soap sit on your skin for 3 minutes.    When the 3 minutes are up, turn on the water and rinse the CHG solution off your body completely.   DO NOT wash with regular soap after you have used the CHG soap solution  Pat yourself dry with a clean, freshly-laundered towel.  DO NOT apply powders, deodorants, or lotions.  Dress in clean, freshly laundered nightclothes.    Be sure to sleep with clean, freshly laundered sheets.  Be aware that CHG will cause stains on fabrics; if you wash them with bleach after use.  Rinse your washcloth and other linens that have contact with CHG completely.  Use only non-chlorine detergents to launder the items used.   The morning of surgery is the fifth day.  Repeat the above steps and dress in clean comfortable clothing     Whom should I contact if I have any questions regarding the use of CHG soap?  Call the [facility], [facility] at [phone] if you have any questions.               Preoperative Brain Exercises    What are brain exercises?  A brain exercise is any activity that engages your thinking (cognitive) skills.    What types of activities are  considered brain exercises?  Jigsaw puzzles, crossword puzzles, word jumble, memory games, word search, and many more.  Many can be found free online or on your phone via a mobile hieu.    Why should I do brain exercises before my surgery?  More recent research has shown brain exercise before surgery can lower the risk of postoperative delirium (confusion) which can be especially important for older adults.  Patients who did brain exercises for 5 to 10 hours the days before surgery, cut their risk of postoperative delirium in half up to 1 week after surgery.         [facility]    Preoperative Deep Breathing Exercises    Why it is important to do deep breathing exercises before my surgery?  Deep breathing exercises strengthen your breathing muscles.  This helps you to recover after your surgery and decreases the chance of breathing complications.      How are the deep breathing exercises done?  Sit straight with your back supported.  Breathe in deeply and slowly through your nose. Your lower rib cage should expand and your abdomen may move forward.  Hold that breath for 3 to 5 seconds.  Breathe out through pursed lips, slowly and completely.  Rest and repeat 10 times every hour while awake.  Rest longer if you become dizzy or lightheaded.         Patient and Family Education             Ways You Can Help Prevent Blood Clots             This handout explains some simple things you can do to help prevent blood clots.      Blood clots are blockages that can form in the body's veins. When a blood clot forms in your deep veins, it may be called a deep vein thrombosis, or DVT for short. Blood clots can happen in any part of the body where blood flows, but they are most common in the arms and legs. If a piece of a blood clot breaks free and travels to the lungs, it is called a pulmonary embolus (PE). A PE can be a very serious problem.         Being in the hospital or having surgery can raise your  chances of getting a blood clot because you may not be well enough to move around as much as you normally do.         Ways you can help prevent blood clots in the hospital         Wearing SCDs. SCDs stands for Sequential Compression Devices.   SCDs are special sleeves that wrap around your legs  They attach to a pump that fills them with air to gently squeeze your legs every few minutes.   This helps return the blood in your legs to your heart.   SCDs should only be taken off when walking or bathing.   SCDs may not be comfortable, but they can help save your life.               Wearing compression stockings - if your doctor orders them. These special snug fitting stockings gently squeeze your legs to help blood flow.       Walking. Walking helps move the blood in your legs.   If your doctor says it is ok, try walking the halls at least   5 times a day. Ask us to help you get up, so you don't fall.      Taking any blood thinning medicines your doctor orders.        Page 1 of 2     [facility]; 3/23   Ways you can help prevent blood clots at home       Wearing compression stockings - if your doctor orders them. ? Walking - to help move the blood in your legs.       Taking any blood thinning medicines your doctor orders.      Signs of a blood clot or PE      Tell your doctor or nurse know right away if you have of the problems listed below.    If you are at home, seek medical care right away. Call 911 for chest pain or problems breathing.               Signs of a blood clot (DVT) - such as pain,  swelling, redness or warmth in your arm or leg      Signs of a pulmonary embolism (PE) - such as chest     pain or feeling short of breath

## 2024-09-16 NOTE — CPM/PAT H&P
CPM/PAT Evaluation       Name: Jayson Elizalde (Jayson Elizalde)  /Age: 1940/84 y.o.     Visit Type:   In-Person       Chief Complaint: Primary adenocarcinoma of lung, unspecified laterality (Multi)    HPI  Patient is a 84-year-old male found to have a right lower lobe lung nodule being evaluated in CPM in anticipation of a robotic assisted right lower lobe superior segmentectomy with mediastinal lymphadenectomy with Dr. Yadi Pleitez on 24.  Past Medical History:   Diagnosis Date    Acute UTI (urinary tract infection) 2024    Atherosclerotic heart disease of native coronary artery without angina pectoris     CAD (coronary artery disease)    COPD (chronic obstructive pulmonary disease) (Multi)     Hearing aid worn     HL (hearing loss)     Hypertension     Limb pain 2024    Numbness 2024    Pain in joint of left shoulder 2024    Pain in joint of right shoulder 2024    Pain of right upper extremity 2024    Personal history of other diseases of the circulatory system 2016    History of right bundle branch block (RBBB)    Personal history of other diseases of the musculoskeletal system and connective tissue     History of osteoarthritis    Personal history of other diseases of the respiratory system     History of chronic obstructive lung disease    Personal history of other endocrine, nutritional and metabolic disease     History of hyperlipidemia    Personal history of other endocrine, nutritional and metabolic disease     History of hyperlipidemia    Personal history of other specified conditions     History of weakness    Trigger finger, acquired 2024       Past Surgical History:   Procedure Laterality Date    EYE SURGERY      OTHER SURGICAL HISTORY  2013    Nerve Block Transforaminal Epidural    PACEMAKER PLACEMENT          SKIN BIOPSY         Patient Sexual activity questions deferred to the physician.    Family History   Problem Relation Name  Age of Onset    Heart attack Mother      Heart attack Father      Heart attack Brother         No Known Allergies    Prior to Admission medications    Medication Sig Start Date End Date Taking? Authorizing Provider   acebutolol (Sectral) 200 mg capsule Take 1 capsule (200 mg) by mouth 2 times a day.   Yes Historical Provider, MD   acetaminophen (Tylenol 8 HOUR) 650 mg ER tablet Take 1 tablet (650 mg) by mouth twice a day.   Yes Historical Provider, MD   albuterol (Ventolin HFA) 90 mcg/actuation inhaler Inhale 2 puffs every 4 hours if needed for wheezing or shortness of breath. 8/26/24 8/26/25 Yes Behzad Jordan MD   aspirin 81 mg chewable tablet Chew 1 tablet (81 mg) 1 time. 8/9/23  Yes Historical Provider, MD   fluticasone-umeclidin-vilanter (TRELEGY-ELLIPTA) 100-62.5-25 mcg blister with device Inhale 1 puff once daily. 8/26/24  Yes Behzad Jordan MD   lisinopriL-hydrochlorothiazide 10-12.5 mg tablet Take 1 tablet by mouth once daily.   Yes Historical Provider, MD   rosuvastatin (Crestor) 10 mg tablet Take 1 tablet (10 mg) by mouth 3 times a week.   Yes Historical Provider, MD ARAGON ROS:   Constitutional:   neg    Neuro/Psych:   neg    Eyes:    use of corrective lenses  Ears:    hearing loss   hearing aides  Nose:   neg    Mouth:   neg    Throat:   neg    Neck:   neg    Cardio:    Right sided chest pain r/t chest pain insertion site  Respiratory:    shortness of breath  Endocrine:   GI:   neg    :   neg    Musculoskeletal:   neg    Hematologic:    bruises/bleeds easily  Skin:  neg        Physical Exam  Constitutional:       Appearance: Normal appearance.   HENT:      Head: Normocephalic and atraumatic.      Ears:      Comments: Hearing aids     Nose: Nose normal.      Mouth/Throat:      Mouth: Mucous membranes are moist.   Cardiovascular:      Rate and Rhythm: Normal rate and regular rhythm.      Pulses: Normal pulses.      Heart sounds: Normal heart sounds.   Pulmonary:      Effort: Pulmonary effort is  normal.      Breath sounds: Normal breath sounds.   Abdominal:      Palpations: Abdomen is soft.   Musculoskeletal:         General: Normal range of motion.      Cervical back: Normal range of motion.   Skin:     General: Skin is warm.   Neurological:      General: No focal deficit present.      Mental Status: He is alert and oriented to person, place, and time.   Psychiatric:         Mood and Affect: Mood normal.         Behavior: Behavior normal.          PAT AIRWAY:   Airway:     Mallampati::  II    TM distance::  >3 FB   upper dentures and lower dentures      Visit Vitals  /82   Pulse 60   Temp 36.3 °C (97.4 °F)   Resp 16       DASI Risk Score      Flowsheet Row Most Recent Value   DASI SCORE 42.7   METS Score (Will be calculated only when all the questions are answered) 8          Caprini DVT Assessment      Flowsheet Row Most Recent Value   DVT Score 10   Current Status Present cancer or chemotherapy, COPD   History Prior major surgery   Age Over 75 years   BMI 31-40 (Obesity)          Modified Frailty Index      Flowsheet Row Most Recent Value   Modified Frailty Index Calculator .3636          CHADS2 Stroke Risk  Current as of 42 minutes ago        4% 3 to 100%: High Risk   2 to < 3%: Medium Risk   0 to < 2%: Low Risk     Last Change: N/A          This score determines the patient's risk of having a stroke if the patient has atrial fibrillation.          Points Metrics   0 Has Congestive Heart Failure:  No     Patients with congestive heart failure get 1 point.    Current as of 42 minutes ago   1 Has Hypertension:  Yes     Patients with hypertension get 1 point.    Current as of 42 minutes ago   1 Age:  84     Patients who are 75 years of age or older get 1 point.    Current as of 42 minutes ago   0 Has Diabetes:  No     Patients with diabetes get 1 point.    Current as of 42 minutes ago   0 Had Stroke:  No  Had TIA:  No  Had Thromboembolism:  No     Patients who have had a stroke, TIA, or  thromboembolism get 2 points.    Current as of 42 minutes ago             Revised Cardiac Risk Index      Flowsheet Row Most Recent Value   Revised Cardiac Risk Calculator 2          Apfel Simplified Score      Flowsheet Row Most Recent Value   Apfel Simplified Score Calculator 2          Risk Analysis Index Results This Encounter    No data found in the last 1 encounters.       Stop Bang Score      Flowsheet Row Most Recent Value   Do you snore loudly? 0   Do you often feel tired or fatigued after your sleep? 0   Has anyone ever observed you stop breathing in your sleep? 0   Do you have or are you being treated for high blood pressure? 1   Recent BMI (Calculated) 31.5   Is BMI greater than 35 kg/m2? 0=No   Age older than 50 years old? 1=Yes   Is your neck circumference greater than 17 inches (Male) or 16 inches (Female)? 0   Gender - Male 1=Yes   STOP-BANG Total Score 3            Assessment and Plan:     Anesthesia:  The patient denies problems with anesthesia in the past such as PONV, prolonged sedation, awareness, dental damage, aspiration, cardiac arrest, difficult intubation, or unexpected hospital admissions.     Neuro:   The patient has no neurological diagnoses or significant findings on chart review, clinical presentation, and evaluation. No grossly apparent neurological perioperative risk. The patient is at increased risk for postoperative delirium secondary to age 65 or older. The patient is at increased risk for perioperative stroke secondary to cardiac disease, hypertension , increased age, general anesthesia, operative time >2.5 hours. Handouts for preoperative brain exercises given to patient.    HEENT/Airway  No diagnoses, significant findings on chart review, clinical presentation, or evaluation.    Cardiovascular  The patient is scheduled for non-cardiac surgery associated with elevated risk. The patient has no major cardiac contraindications to non- cardiac surgery.  RCRI  The patient meets 0-1  RCRI criteria and therefore has a less than 1% risk of major adverse cardiac complications.  METS  The patient's functional capacity capacity is greater than 4 METS.  EKG  The patient has no EKG or echocardiographic changes concerning for myocardial ischemia.   Heart Failure  The patient has no known history of heart failure.  Additionally, the patient reports no symptoms of heart failure and demonstrates no signs of heart failure.  Hypertension Evaluation  The patient has a known history of hypertension that is controlled.  Patient's hypertension is most consistent with stage 1.  Heart Rhythm Evaluation  Patient has a history of afib which is paroxysmal and is treated by watchman, The patient has a cardiac implantable electrical device for treatment of sick sinus syndrome. Device is a Pacemaker.  Please consider evaluation of device on day of surgery.  Heart Valve Evaluation  The patient has no known history of valvular heart disease. The patient has no symptoms or physical exam findings to suggest valvular heart disease.  Cardiology Evaluation  The patient follows with cardiology, Dr. Toro. Patient was last seen 7-16-24. Per note,   Patient is stable from a cardiac standpoint continue aspirin and statin for cardioprotection, lisinopril for hypertension.  Pacemaker functioning well, adequate battery life.  Patient should continue a low-salt low-fat low-cholesterol diet.  Continue regular physical activity.  Follow-up with PCP as advised.  Return for follow-up in 6 months for routine cardiac follow-up.  The patient has a 30-day risk for MACE of 1 predictor, 6.0% risk for cardiac death, nonfatal myocardial infarction, and nonfatal cardiac arrest.  ERNESTO score which indicates a 0.1% risk of intraoperative or 30-day postoperative MACE (major adverse cardiac event).    Pulmonary   The patient has COPD. The patient is at increased risk of perioperative pulmonary complications secondary to thoracic surgery,  advanced age greater than 60.    The patient has a stop bang score of 3, which places patient at intermediate risk for having NASEEM.    ARISCAT 47, High, 42.1% risk of in-hospital postoperative pulmonary complications  PRODIGY 24, high risk of respiratory depression episode. Patient given PI sheet for preoperative deep breathing exercises.    Hematology  The patient has RLL lung nodule  Antiplatelet management   The patient is currently receiving antiplatelet therapy for CAD.  Anticoagulation management  The patient is not currently receiving anticoagulation therapy.    Caprini score 10, high risk of perioperative VTE.     Patient instructed to ambulate as soon as possible postoperatively to decrease thromboembolic risk. Initiate mechanical DVT prophylaxis as soon as possible and initiate chemical prophylaxis when deemed safe from a bleeding standpoint post surgery.     Transfusion Evaluation  A type and screen was obtained given the likelihood for perioperative transfusion of blood or blood products.    Gastrointestinal  No diagnoses or significant findings on chart review or clinical presentation and evaluation.    Eat 10- 0,  self-perceived oropharyngeal dysphagia scale (0-40)     Genitourinary  No diagnoses or significant findings on chart review or clinical presentation and evaluation.    Renal  No renal diagnoses or significant findings on chart review or clinical presentation and evaluation. The patient has specific risk factors associated with increased risk of perioperative renal complications related to age greater than 55, male gender, hypertension, COPD.    Musculoskeletal  No diagnoses or significant findings on chart review or clinical presentation and evaluation.    Endocrine  Diabetes Evaluation  The patient has no history of diabetes mellitus.  Thyroid Disease Evaluation  The patient has no history of thyroid disease.    ID  No diagnoses or significant findings on chart review or clinical presentation  and evaluation. MRSA screening obtained. Prescriptions and instructions given for Hibiclens and Peridex.    -Preoperative medication instructions were provided and reviewed with the patient.  Any additional testing or evaluation was explained to the patient.  NPO Instructions were discussed, and the patient's questions were answered prior to conclusion of this encounter. Patient verbalized understanding of preoperative instructions. After Visit Summary given.        Recent Results (from the past 168 hour(s))   Staphylococcus aureus/MRSA colonization, Culture    Collection Time: 09/16/24 11:30 AM    Specimen: Nares/Axilla/Groin; Swab   Result Value Ref Range    Staph/MRSA Screen Culture No Staphylococcus aureus isolated    Basic Metabolic Panel    Collection Time: 09/16/24 11:30 AM   Result Value Ref Range    Glucose 103 (H) 74 - 99 mg/dL    Sodium 140 136 - 145 mmol/L    Potassium 4.5 3.5 - 5.3 mmol/L    Chloride 102 98 - 107 mmol/L    Bicarbonate 29 21 - 32 mmol/L    Anion Gap 14 10 - 20 mmol/L    Urea Nitrogen 13 6 - 23 mg/dL    Creatinine 0.81 0.50 - 1.30 mg/dL    eGFR 88 >60 mL/min/1.73m*2    Calcium 9.5 8.6 - 10.6 mg/dL   Type And Screen    Collection Time: 09/16/24 11:30 AM   Result Value Ref Range    ABO TYPE A     Rh TYPE POS     ANTIBODY SCREEN NEG    CBC    Collection Time: 09/16/24 11:30 AM   Result Value Ref Range    WBC 5.5 4.4 - 11.3 x10*3/uL    nRBC 0.0 0.0 - 0.0 /100 WBCs    RBC 4.66 4.50 - 5.90 x10*6/uL    Hemoglobin 13.9 13.5 - 17.5 g/dL    Hematocrit 42.0 41.0 - 52.0 %    MCV 90 80 - 100 fL    MCH 29.8 26.0 - 34.0 pg    MCHC 33.1 32.0 - 36.0 g/dL    RDW 13.7 11.5 - 14.5 %    Platelets 189 150 - 450 x10*3/uL

## 2024-09-18 LAB — STAPHYLOCOCCUS SPEC CULT: NORMAL

## 2024-09-19 ENCOUNTER — PATIENT OUTREACH (OUTPATIENT)
Dept: PRIMARY CARE | Facility: CLINIC | Age: 84
End: 2024-09-19
Payer: MEDICARE

## 2024-09-24 ENCOUNTER — ANESTHESIA EVENT (OUTPATIENT)
Dept: OPERATING ROOM | Facility: HOSPITAL | Age: 84
End: 2024-09-24
Payer: MEDICARE

## 2024-09-24 NOTE — PROGRESS NOTES
Pharmacy Medication History Review    Jayson Elizalde is a 84 y.o. male who is planned to be admitted for Primary adenocarcinoma of lung (Multi). Pharmacy called the patient prior to their scheduled procedure and reviewed the patient's [id number] medications for accuracy.    Medications ADDED:  none  Medications CHANGED:  Lisinopril/hydrochlorothiazide 10-12.5mg to lisinopril/hydrochlorothiazide 20-12.5mg  Medications REMOVED:   none    Please review updated prior to admission medication list and comments regarding how patient may be taking medications differently by going to Admission tab --> Admission Orders --> Admit Orders / Review prior to admission medications.     Preferred pharmacy and allergies to be confirmed with patient by nursing the day of procedure.     Sources used to complete the med history include spoke with patient, surescripts, oarrs, care everywhere    Below are additional concerns with the patient's PTA list.  betsy Driver    [facility]  Please reach out via Secure Chat for questions

## 2024-09-25 ENCOUNTER — ANESTHESIA (OUTPATIENT)
Dept: OPERATING ROOM | Facility: HOSPITAL | Age: 84
End: 2024-09-25
Payer: MEDICARE

## 2024-09-25 ENCOUNTER — APPOINTMENT (OUTPATIENT)
Dept: CARDIOLOGY | Facility: HOSPITAL | Age: 84
End: 2024-09-25
Payer: MEDICARE

## 2024-09-25 ENCOUNTER — APPOINTMENT (OUTPATIENT)
Dept: RADIOLOGY | Facility: HOSPITAL | Age: 84
End: 2024-09-25
Payer: MEDICARE

## 2024-09-25 ENCOUNTER — HOSPITAL ENCOUNTER (INPATIENT)
Facility: HOSPITAL | Age: 84
LOS: 2 days | Discharge: HOME | End: 2024-09-27
Attending: THORACIC SURGERY (CARDIOTHORACIC VASCULAR SURGERY) | Admitting: THORACIC SURGERY (CARDIOTHORACIC VASCULAR SURGERY)
Payer: MEDICARE

## 2024-09-25 DIAGNOSIS — I10 HYPERTENSION, UNSPECIFIED TYPE: ICD-10-CM

## 2024-09-25 DIAGNOSIS — C34.90 PRIMARY ADENOCARCINOMA OF LUNG, UNSPECIFIED LATERALITY (MULTI): ICD-10-CM

## 2024-09-25 DIAGNOSIS — C34.91 NON-SMALL CELL CANCER OF RIGHT LUNG (MULTI): Primary | ICD-10-CM

## 2024-09-25 LAB
ABO GROUP (TYPE) IN BLOOD: NORMAL
RH FACTOR (ANTIGEN D): NORMAL

## 2024-09-25 PROCEDURE — 93286 PERI-PX EVAL PM/LDLS PM IP: CPT | Performed by: INTERNAL MEDICINE

## 2024-09-25 PROCEDURE — 3700000002 HC GENERAL ANESTHESIA TIME - EACH INCREMENTAL 1 MINUTE: Performed by: THORACIC SURGERY (CARDIOTHORACIC VASCULAR SURGERY)

## 2024-09-25 PROCEDURE — 32666 THORACOSCOPY W/WEDGE RESECT: CPT | Performed by: THORACIC SURGERY (CARDIOTHORACIC VASCULAR SURGERY)

## 2024-09-25 PROCEDURE — 2500000005 HC RX 250 GENERAL PHARMACY W/O HCPCS: Performed by: THORACIC SURGERY (CARDIOTHORACIC VASCULAR SURGERY)

## 2024-09-25 PROCEDURE — 2500000005 HC RX 250 GENERAL PHARMACY W/O HCPCS

## 2024-09-25 PROCEDURE — 2720000007 HC OR 272 NO HCPCS: Performed by: THORACIC SURGERY (CARDIOTHORACIC VASCULAR SURGERY)

## 2024-09-25 PROCEDURE — 2780000003 HC OR 278 NO HCPCS: Performed by: THORACIC SURGERY (CARDIOTHORACIC VASCULAR SURGERY)

## 2024-09-25 PROCEDURE — 7100000002 HC RECOVERY ROOM TIME - EACH INCREMENTAL 1 MINUTE: Performed by: THORACIC SURGERY (CARDIOTHORACIC VASCULAR SURGERY)

## 2024-09-25 PROCEDURE — 71045 X-RAY EXAM CHEST 1 VIEW: CPT

## 2024-09-25 PROCEDURE — 93286 PERI-PX EVAL PM/LDLS PM IP: CPT

## 2024-09-25 PROCEDURE — 7100000001 HC RECOVERY ROOM TIME - INITIAL BASE CHARGE: Performed by: THORACIC SURGERY (CARDIOTHORACIC VASCULAR SURGERY)

## 2024-09-25 PROCEDURE — 1200000002 HC GENERAL ROOM WITH TELEMETRY DAILY

## 2024-09-25 PROCEDURE — 88307 TISSUE EXAM BY PATHOLOGIST: CPT | Performed by: STUDENT IN AN ORGANIZED HEALTH CARE EDUCATION/TRAINING PROGRAM

## 2024-09-25 PROCEDURE — 88307 TISSUE EXAM BY PATHOLOGIST: CPT | Mod: TC,SUR | Performed by: THORACIC SURGERY (CARDIOTHORACIC VASCULAR SURGERY)

## 2024-09-25 PROCEDURE — 3600000017 HC OR TIME - EACH INCREMENTAL 1 MINUTE - PROCEDURE LEVEL SIX: Performed by: THORACIC SURGERY (CARDIOTHORACIC VASCULAR SURGERY)

## 2024-09-25 PROCEDURE — 2500000001 HC RX 250 WO HCPCS SELF ADMINISTERED DRUGS (ALT 637 FOR MEDICARE OP): Performed by: PHYSICIAN ASSISTANT

## 2024-09-25 PROCEDURE — 32674 THORACOSCOPY LYMPH NODE EXC: CPT | Performed by: THORACIC SURGERY (CARDIOTHORACIC VASCULAR SURGERY)

## 2024-09-25 PROCEDURE — 0BJ08ZZ INSPECTION OF TRACHEOBRONCHIAL TREE, VIA NATURAL OR ARTIFICIAL OPENING ENDOSCOPIC: ICD-10-PCS | Performed by: THORACIC SURGERY (CARDIOTHORACIC VASCULAR SURGERY)

## 2024-09-25 PROCEDURE — 88313 SPECIAL STAINS GROUP 2: CPT | Performed by: STUDENT IN AN ORGANIZED HEALTH CARE EDUCATION/TRAINING PROGRAM

## 2024-09-25 PROCEDURE — 2500000004 HC RX 250 GENERAL PHARMACY W/ HCPCS (ALT 636 FOR OP/ED): Performed by: PHYSICIAN ASSISTANT

## 2024-09-25 PROCEDURE — 2500000002 HC RX 250 W HCPCS SELF ADMINISTERED DRUGS (ALT 637 FOR MEDICARE OP, ALT 636 FOR OP/ED): Performed by: PHYSICIAN ASSISTANT

## 2024-09-25 PROCEDURE — 32666 THORACOSCOPY W/WEDGE RESECT: CPT | Performed by: PHYSICIAN ASSISTANT

## 2024-09-25 PROCEDURE — 2500000004 HC RX 250 GENERAL PHARMACY W/ HCPCS (ALT 636 FOR OP/ED)

## 2024-09-25 PROCEDURE — 32674 THORACOSCOPY LYMPH NODE EXC: CPT | Performed by: PHYSICIAN ASSISTANT

## 2024-09-25 PROCEDURE — 07T70ZZ RESECTION OF THORAX LYMPHATIC, OPEN APPROACH: ICD-10-PCS | Performed by: THORACIC SURGERY (CARDIOTHORACIC VASCULAR SURGERY)

## 2024-09-25 PROCEDURE — 88331 PATH CONSLTJ SURG 1 BLK 1SPC: CPT | Mod: TC,SUR | Performed by: STUDENT IN AN ORGANIZED HEALTH CARE EDUCATION/TRAINING PROGRAM

## 2024-09-25 PROCEDURE — 3600000018 HC OR TIME - INITIAL BASE CHARGE - PROCEDURE LEVEL SIX: Performed by: THORACIC SURGERY (CARDIOTHORACIC VASCULAR SURGERY)

## 2024-09-25 PROCEDURE — 8E0W4CZ ROBOTIC ASSISTED PROCEDURE OF TRUNK REGION, PERCUTANEOUS ENDOSCOPIC APPROACH: ICD-10-PCS | Performed by: THORACIC SURGERY (CARDIOTHORACIC VASCULAR SURGERY)

## 2024-09-25 PROCEDURE — 71045 X-RAY EXAM CHEST 1 VIEW: CPT | Performed by: RADIOLOGY

## 2024-09-25 PROCEDURE — 88305 TISSUE EXAM BY PATHOLOGIST: CPT | Performed by: STUDENT IN AN ORGANIZED HEALTH CARE EDUCATION/TRAINING PROGRAM

## 2024-09-25 PROCEDURE — 36415 COLL VENOUS BLD VENIPUNCTURE: CPT | Performed by: THORACIC SURGERY (CARDIOTHORACIC VASCULAR SURGERY)

## 2024-09-25 PROCEDURE — 3700000001 HC GENERAL ANESTHESIA TIME - INITIAL BASE CHARGE: Performed by: THORACIC SURGERY (CARDIOTHORACIC VASCULAR SURGERY)

## 2024-09-25 PROCEDURE — 0BBF4ZZ EXCISION OF RIGHT LOWER LUNG LOBE, PERCUTANEOUS ENDOSCOPIC APPROACH: ICD-10-PCS | Performed by: THORACIC SURGERY (CARDIOTHORACIC VASCULAR SURGERY)

## 2024-09-25 RX ORDER — ACETAMINOPHEN 325 MG/1
650 TABLET ORAL EVERY 4 HOURS PRN
Status: DISCONTINUED | OUTPATIENT
Start: 2024-09-25 | End: 2024-09-26

## 2024-09-25 RX ORDER — NAPROXEN SODIUM 220 MG/1
81 TABLET, FILM COATED ORAL DAILY
Status: DISCONTINUED | OUTPATIENT
Start: 2024-09-26 | End: 2024-09-27 | Stop reason: HOSPADM

## 2024-09-25 RX ORDER — OXYCODONE HYDROCHLORIDE 5 MG/1
5 TABLET ORAL EVERY 4 HOURS PRN
Status: DISCONTINUED | OUTPATIENT
Start: 2024-09-25 | End: 2024-09-25 | Stop reason: HOSPADM

## 2024-09-25 RX ORDER — OXYCODONE HYDROCHLORIDE 10 MG/1
10 TABLET ORAL EVERY 4 HOURS PRN
Status: DISCONTINUED | OUTPATIENT
Start: 2024-09-25 | End: 2024-09-27 | Stop reason: HOSPADM

## 2024-09-25 RX ORDER — FLUTICASONE FUROATE AND VILANTEROL 100; 25 UG/1; UG/1
1 POWDER RESPIRATORY (INHALATION)
Status: DISCONTINUED | OUTPATIENT
Start: 2024-09-25 | End: 2024-09-27 | Stop reason: HOSPADM

## 2024-09-25 RX ORDER — ONDANSETRON HYDROCHLORIDE 2 MG/ML
4 INJECTION, SOLUTION INTRAVENOUS ONCE AS NEEDED
Status: DISCONTINUED | OUTPATIENT
Start: 2024-09-25 | End: 2024-09-25 | Stop reason: HOSPADM

## 2024-09-25 RX ORDER — OXYCODONE HYDROCHLORIDE 5 MG/1
5 TABLET ORAL EVERY 4 HOURS PRN
Status: DISCONTINUED | OUTPATIENT
Start: 2024-09-25 | End: 2024-09-27 | Stop reason: HOSPADM

## 2024-09-25 RX ORDER — LIDOCAINE HYDROCHLORIDE 10 MG/ML
0.1 INJECTION, SOLUTION INFILTRATION; PERINEURAL ONCE
Status: DISCONTINUED | OUTPATIENT
Start: 2024-09-25 | End: 2024-09-25 | Stop reason: HOSPADM

## 2024-09-25 RX ORDER — ALBUTEROL SULFATE 0.83 MG/ML
2.5 SOLUTION RESPIRATORY (INHALATION) ONCE AS NEEDED
Status: DISCONTINUED | OUTPATIENT
Start: 2024-09-25 | End: 2024-09-25 | Stop reason: HOSPADM

## 2024-09-25 RX ORDER — IPRATROPIUM BROMIDE AND ALBUTEROL SULFATE 2.5; .5 MG/3ML; MG/3ML
3 SOLUTION RESPIRATORY (INHALATION) EVERY 6 HOURS PRN
Status: DISCONTINUED | OUTPATIENT
Start: 2024-09-25 | End: 2024-09-26

## 2024-09-25 RX ORDER — ACEBUTOLOL HYDROCHLORIDE 200 MG/1
200 CAPSULE ORAL 2 TIMES DAILY
Status: DISCONTINUED | OUTPATIENT
Start: 2024-09-25 | End: 2024-09-27 | Stop reason: HOSPADM

## 2024-09-25 RX ORDER — HEPARIN SODIUM 5000 [USP'U]/ML
INJECTION, SOLUTION INTRAVENOUS; SUBCUTANEOUS AS NEEDED
Status: DISCONTINUED | OUTPATIENT
Start: 2024-09-25 | End: 2024-09-25

## 2024-09-25 RX ORDER — CEFAZOLIN 1 G/1
INJECTION, POWDER, FOR SOLUTION INTRAVENOUS AS NEEDED
Status: DISCONTINUED | OUTPATIENT
Start: 2024-09-25 | End: 2024-09-25

## 2024-09-25 RX ORDER — CEFAZOLIN SODIUM 2 G/100ML
2 INJECTION, SOLUTION INTRAVENOUS EVERY 8 HOURS
Status: COMPLETED | OUTPATIENT
Start: 2024-09-25 | End: 2024-09-26

## 2024-09-25 RX ORDER — HYDROMORPHONE HCL/0.9% NACL/PF 15 MG/30ML
PATIENT CONTROLLED ANALGESIA SYRINGE INTRAVENOUS CONTINUOUS
Status: CANCELLED | OUTPATIENT
Start: 2024-09-25

## 2024-09-25 RX ORDER — ONDANSETRON HYDROCHLORIDE 2 MG/ML
4 INJECTION, SOLUTION INTRAVENOUS EVERY 8 HOURS PRN
Status: DISCONTINUED | OUTPATIENT
Start: 2024-09-25 | End: 2024-09-27 | Stop reason: HOSPADM

## 2024-09-25 RX ORDER — LIDOCAINE HYDROCHLORIDE 20 MG/ML
INJECTION, SOLUTION INFILTRATION; PERINEURAL AS NEEDED
Status: DISCONTINUED | OUTPATIENT
Start: 2024-09-25 | End: 2024-09-25

## 2024-09-25 RX ORDER — LABETALOL HYDROCHLORIDE 5 MG/ML
5 INJECTION, SOLUTION INTRAVENOUS ONCE AS NEEDED
Status: DISCONTINUED | OUTPATIENT
Start: 2024-09-25 | End: 2024-09-25 | Stop reason: HOSPADM

## 2024-09-25 RX ORDER — ONDANSETRON HYDROCHLORIDE 2 MG/ML
INJECTION, SOLUTION INTRAVENOUS AS NEEDED
Status: DISCONTINUED | OUTPATIENT
Start: 2024-09-25 | End: 2024-09-25

## 2024-09-25 RX ORDER — FENTANYL CITRATE 50 UG/ML
INJECTION, SOLUTION INTRAMUSCULAR; INTRAVENOUS AS NEEDED
Status: DISCONTINUED | OUTPATIENT
Start: 2024-09-25 | End: 2024-09-25

## 2024-09-25 RX ORDER — PROPOFOL 10 MG/ML
INJECTION, EMULSION INTRAVENOUS
Status: COMPLETED
Start: 2024-09-25 | End: 2024-09-25

## 2024-09-25 RX ORDER — ROSUVASTATIN CALCIUM 20 MG/1
10 TABLET, COATED ORAL 3 TIMES WEEKLY
Status: DISCONTINUED | OUTPATIENT
Start: 2024-09-25 | End: 2024-09-27 | Stop reason: HOSPADM

## 2024-09-25 RX ORDER — OXYCODONE HYDROCHLORIDE 5 MG/1
10 TABLET ORAL EVERY 4 HOURS PRN
Status: DISCONTINUED | OUTPATIENT
Start: 2024-09-25 | End: 2024-09-25 | Stop reason: HOSPADM

## 2024-09-25 RX ORDER — BUPIVACAINE HCL/EPINEPHRINE 0.25-.0005
VIAL (ML) INJECTION AS NEEDED
Status: DISCONTINUED | OUTPATIENT
Start: 2024-09-25 | End: 2024-09-25 | Stop reason: HOSPADM

## 2024-09-25 RX ORDER — CEFAZOLIN SODIUM 2 G/100ML
2 INJECTION, SOLUTION INTRAVENOUS ONCE
Status: DISCONTINUED | OUTPATIENT
Start: 2024-09-25 | End: 2024-09-25 | Stop reason: HOSPADM

## 2024-09-25 RX ORDER — HYDROMORPHONE HYDROCHLORIDE 1 MG/ML
INJECTION, SOLUTION INTRAMUSCULAR; INTRAVENOUS; SUBCUTANEOUS AS NEEDED
Status: DISCONTINUED | OUTPATIENT
Start: 2024-09-25 | End: 2024-09-25 | Stop reason: HOSPADM

## 2024-09-25 RX ORDER — AMOXICILLIN 250 MG
2 CAPSULE ORAL 2 TIMES DAILY
Status: DISCONTINUED | OUTPATIENT
Start: 2024-09-25 | End: 2024-09-27 | Stop reason: HOSPADM

## 2024-09-25 RX ORDER — SODIUM CHLORIDE, SODIUM LACTATE, POTASSIUM CHLORIDE, CALCIUM CHLORIDE 600; 310; 30; 20 MG/100ML; MG/100ML; MG/100ML; MG/100ML
100 INJECTION, SOLUTION INTRAVENOUS CONTINUOUS
Status: DISCONTINUED | OUTPATIENT
Start: 2024-09-25 | End: 2024-09-25

## 2024-09-25 RX ORDER — FENTANYL CITRATE 50 UG/ML
INJECTION, SOLUTION INTRAMUSCULAR; INTRAVENOUS
Status: COMPLETED
Start: 2024-09-25 | End: 2024-09-25

## 2024-09-25 RX ORDER — METHOCARBAMOL 100 MG/ML
1000 INJECTION, SOLUTION INTRAMUSCULAR; INTRAVENOUS ONCE
Status: COMPLETED | OUTPATIENT
Start: 2024-09-25 | End: 2024-09-25

## 2024-09-25 RX ORDER — PROPOFOL 10 MG/ML
INJECTION, EMULSION INTRAVENOUS AS NEEDED
Status: DISCONTINUED | OUTPATIENT
Start: 2024-09-25 | End: 2024-09-25

## 2024-09-25 RX ORDER — HYDROMORPHONE HYDROCHLORIDE 1 MG/ML
INJECTION, SOLUTION INTRAMUSCULAR; INTRAVENOUS; SUBCUTANEOUS
Status: COMPLETED
Start: 2024-09-25 | End: 2024-09-25

## 2024-09-25 RX ORDER — HYDROMORPHONE HYDROCHLORIDE 1 MG/ML
0.5 INJECTION, SOLUTION INTRAMUSCULAR; INTRAVENOUS; SUBCUTANEOUS EVERY 5 MIN PRN
Status: DISCONTINUED | OUTPATIENT
Start: 2024-09-25 | End: 2024-09-25 | Stop reason: HOSPADM

## 2024-09-25 RX ORDER — HYDRALAZINE HYDROCHLORIDE 20 MG/ML
5 INJECTION INTRAMUSCULAR; INTRAVENOUS EVERY 30 MIN PRN
Status: DISCONTINUED | OUTPATIENT
Start: 2024-09-25 | End: 2024-09-25 | Stop reason: HOSPADM

## 2024-09-25 RX ORDER — HEPARIN SODIUM 5000 [USP'U]/ML
5000 INJECTION, SOLUTION INTRAVENOUS; SUBCUTANEOUS EVERY 8 HOURS
Status: DISCONTINUED | OUTPATIENT
Start: 2024-09-25 | End: 2024-09-27 | Stop reason: HOSPADM

## 2024-09-25 RX ORDER — NALOXONE HYDROCHLORIDE 0.4 MG/ML
0.2 INJECTION, SOLUTION INTRAMUSCULAR; INTRAVENOUS; SUBCUTANEOUS AS NEEDED
Status: DISCONTINUED | OUTPATIENT
Start: 2024-09-25 | End: 2024-09-27 | Stop reason: HOSPADM

## 2024-09-25 RX ORDER — HYDROMORPHONE HYDROCHLORIDE 1 MG/ML
0.2 INJECTION, SOLUTION INTRAMUSCULAR; INTRAVENOUS; SUBCUTANEOUS EVERY 5 MIN PRN
Status: DISCONTINUED | OUTPATIENT
Start: 2024-09-25 | End: 2024-09-25 | Stop reason: HOSPADM

## 2024-09-25 RX ORDER — ACETAMINOPHEN 325 MG/1
650 TABLET ORAL EVERY 4 HOURS PRN
Status: DISCONTINUED | OUTPATIENT
Start: 2024-09-25 | End: 2024-09-25 | Stop reason: HOSPADM

## 2024-09-25 RX ORDER — ROCURONIUM BROMIDE 10 MG/ML
INJECTION, SOLUTION INTRAVENOUS AS NEEDED
Status: DISCONTINUED | OUTPATIENT
Start: 2024-09-25 | End: 2024-09-25

## 2024-09-25 SDOH — SOCIAL STABILITY: SOCIAL INSECURITY: HAVE YOU HAD ANY THOUGHTS OF HARMING ANYONE ELSE?: NO

## 2024-09-25 SDOH — SOCIAL STABILITY: SOCIAL INSECURITY: HAS ANYONE EVER THREATENED TO HURT YOUR FAMILY OR YOUR PETS?: NO

## 2024-09-25 SDOH — SOCIAL STABILITY: SOCIAL INSECURITY
WITHIN THE LAST YEAR, HAVE YOU BEEN KICKED, HIT, SLAPPED, OR OTHERWISE PHYSICALLY HURT BY YOUR PARTNER OR EX-PARTNER?: PATIENT DECLINED

## 2024-09-25 SDOH — ECONOMIC STABILITY: HOUSING INSECURITY: AT ANY TIME IN THE PAST 12 MONTHS, WERE YOU HOMELESS OR LIVING IN A SHELTER (INCLUDING NOW)?: NO

## 2024-09-25 SDOH — ECONOMIC STABILITY: FOOD INSECURITY: WITHIN THE PAST 12 MONTHS, THE FOOD YOU BOUGHT JUST DIDN'T LAST AND YOU DIDN'T HAVE MONEY TO GET MORE.: NEVER TRUE

## 2024-09-25 SDOH — SOCIAL STABILITY: SOCIAL INSECURITY
WITHIN THE LAST YEAR, HAVE TO BEEN RAPED OR FORCED TO HAVE ANY KIND OF SEXUAL ACTIVITY BY YOUR PARTNER OR EX-PARTNER?: PATIENT DECLINED

## 2024-09-25 SDOH — SOCIAL STABILITY: SOCIAL INSECURITY: ARE THERE ANY APPARENT SIGNS OF INJURIES/BEHAVIORS THAT COULD BE RELATED TO ABUSE/NEGLECT?: NO

## 2024-09-25 SDOH — SOCIAL STABILITY: SOCIAL INSECURITY: WITHIN THE LAST YEAR, HAVE YOU BEEN AFRAID OF YOUR PARTNER OR EX-PARTNER?: PATIENT DECLINED

## 2024-09-25 SDOH — ECONOMIC STABILITY: INCOME INSECURITY: IN THE LAST 12 MONTHS, WAS THERE A TIME WHEN YOU WERE NOT ABLE TO PAY THE MORTGAGE OR RENT ON TIME?: NO

## 2024-09-25 SDOH — SOCIAL STABILITY: SOCIAL INSECURITY
WITHIN THE LAST YEAR, HAVE YOU BEEN RAPED OR FORCED TO HAVE ANY KIND OF SEXUAL ACTIVITY BY YOUR PARTNER OR EX-PARTNER?: PATIENT DECLINED

## 2024-09-25 SDOH — SOCIAL STABILITY: SOCIAL INSECURITY: HAVE YOU HAD THOUGHTS OF HARMING ANYONE ELSE?: NO

## 2024-09-25 SDOH — SOCIAL STABILITY: SOCIAL INSECURITY
WITHIN THE LAST YEAR, HAVE YOU BEEN HUMILIATED OR EMOTIONALLY ABUSED IN OTHER WAYS BY YOUR PARTNER OR EX-PARTNER?: PATIENT DECLINED

## 2024-09-25 SDOH — SOCIAL STABILITY: SOCIAL INSECURITY: ARE YOU OR HAVE YOU BEEN THREATENED OR ABUSED PHYSICALLY, EMOTIONALLY, OR SEXUALLY BY ANYONE?: NO

## 2024-09-25 SDOH — SOCIAL STABILITY: SOCIAL INSECURITY: ABUSE: ADULT

## 2024-09-25 SDOH — ECONOMIC STABILITY: TRANSPORTATION INSECURITY: IN THE PAST 12 MONTHS, HAS LACK OF TRANSPORTATION KEPT YOU FROM MEDICAL APPOINTMENTS OR FROM GETTING MEDICATIONS?: NO

## 2024-09-25 SDOH — SOCIAL STABILITY: SOCIAL INSECURITY: DOES ANYONE TRY TO KEEP YOU FROM HAVING/CONTACTING OTHER FRIENDS OR DOING THINGS OUTSIDE YOUR HOME?: NO

## 2024-09-25 SDOH — ECONOMIC STABILITY: INCOME INSECURITY: IN THE PAST 12 MONTHS, HAS THE ELECTRIC, GAS, OIL, OR WATER COMPANY THREATENED TO SHUT OFF SERVICE IN YOUR HOME?: NO

## 2024-09-25 SDOH — ECONOMIC STABILITY: HOUSING INSECURITY: IN THE PAST 12 MONTHS, HOW MANY TIMES HAVE YOU MOVED WHERE YOU WERE LIVING?: 0

## 2024-09-25 SDOH — ECONOMIC STABILITY: FOOD INSECURITY: WITHIN THE PAST 12 MONTHS, YOU WORRIED THAT YOUR FOOD WOULD RUN OUT BEFORE YOU GOT THE MONEY TO BUY MORE.: NEVER TRUE

## 2024-09-25 SDOH — ECONOMIC STABILITY: FOOD INSECURITY: WITHIN THE PAST 12 MONTHS, YOU WORRIED THAT YOUR FOOD WOULD RUN OUT BEFORE YOU GOT MONEY TO BUY MORE.: NEVER TRUE

## 2024-09-25 SDOH — ECONOMIC STABILITY: HOUSING INSECURITY: IN THE LAST 12 MONTHS, WAS THERE A TIME WHEN YOU WERE NOT ABLE TO PAY THE MORTGAGE OR RENT ON TIME?: NO

## 2024-09-25 SDOH — ECONOMIC STABILITY: TRANSPORTATION INSECURITY
IN THE PAST 12 MONTHS, HAS THE LACK OF TRANSPORTATION KEPT YOU FROM MEDICAL APPOINTMENTS OR FROM GETTING MEDICATIONS?: NO

## 2024-09-25 SDOH — ECONOMIC STABILITY: FOOD INSECURITY: HOW HARD IS IT FOR YOU TO PAY FOR THE VERY BASICS LIKE FOOD, HOUSING, MEDICAL CARE, AND HEATING?: NOT HARD AT ALL

## 2024-09-25 SDOH — SOCIAL STABILITY: SOCIAL INSECURITY: WERE YOU ABLE TO COMPLETE ALL THE BEHAVIORAL HEALTH SCREENINGS?: YES

## 2024-09-25 SDOH — ECONOMIC STABILITY: INCOME INSECURITY: IN THE PAST 12 MONTHS HAS THE ELECTRIC, GAS, OIL, OR WATER COMPANY THREATENED TO SHUT OFF SERVICES IN YOUR HOME?: NO

## 2024-09-25 SDOH — SOCIAL STABILITY: SOCIAL INSECURITY: DO YOU FEEL UNSAFE GOING BACK TO THE PLACE WHERE YOU ARE LIVING?: NO

## 2024-09-25 SDOH — SOCIAL STABILITY: SOCIAL INSECURITY: DO YOU FEEL ANYONE HAS EXPLOITED OR TAKEN ADVANTAGE OF YOU FINANCIALLY OR OF YOUR PERSONAL PROPERTY?: NO

## 2024-09-25 SDOH — ECONOMIC STABILITY: TRANSPORTATION INSECURITY
IN THE PAST 12 MONTHS, HAS LACK OF TRANSPORTATION KEPT YOU FROM MEETINGS, WORK, OR FROM GETTING THINGS NEEDED FOR DAILY LIVING?: NO

## 2024-09-25 SDOH — ECONOMIC STABILITY: INCOME INSECURITY: HOW HARD IS IT FOR YOU TO PAY FOR THE VERY BASICS LIKE FOOD, HOUSING, MEDICAL CARE, AND HEATING?: NOT HARD AT ALL

## 2024-09-25 ASSESSMENT — PAIN - FUNCTIONAL ASSESSMENT
PAIN_FUNCTIONAL_ASSESSMENT: 0-10
PAIN_FUNCTIONAL_ASSESSMENT: UNABLE TO SELF-REPORT
PAIN_FUNCTIONAL_ASSESSMENT: 0-10
PAIN_FUNCTIONAL_ASSESSMENT: UNABLE TO SELF-REPORT

## 2024-09-25 ASSESSMENT — PATIENT HEALTH QUESTIONNAIRE - PHQ9
SUM OF ALL RESPONSES TO PHQ9 QUESTIONS 1 & 2: 0
SUM OF ALL RESPONSES TO PHQ9 QUESTIONS 1 & 2: 0
1. LITTLE INTEREST OR PLEASURE IN DOING THINGS: NOT AT ALL
2. FEELING DOWN, DEPRESSED OR HOPELESS: NOT AT ALL
1. LITTLE INTEREST OR PLEASURE IN DOING THINGS: NOT AT ALL
2. FEELING DOWN, DEPRESSED OR HOPELESS: NOT AT ALL

## 2024-09-25 ASSESSMENT — ACTIVITIES OF DAILY LIVING (ADL)
FEEDING YOURSELF: INDEPENDENT
DRESSING YOURSELF: INDEPENDENT
TOILETING: INDEPENDENT
HEARING - LEFT EAR: HEARING AID
PATIENT'S MEMORY ADEQUATE TO SAFELY COMPLETE DAILY ACTIVITIES?: YES
LACK_OF_TRANSPORTATION: NO
ASSISTIVE_DEVICE: DENTURES UPPER;DENTURES LOWER;HEARING AID - LEFT;HEARING AID - RIGHT
LACK_OF_TRANSPORTATION: NO
HEARING - RIGHT EAR: HEARING AID
JUDGMENT_ADEQUATE_SAFELY_COMPLETE_DAILY_ACTIVITIES: YES
BATHING: INDEPENDENT
WALKS IN HOME: INDEPENDENT
ADEQUATE_TO_COMPLETE_ADL: YES
LACK_OF_TRANSPORTATION: NO
GROOMING: INDEPENDENT

## 2024-09-25 ASSESSMENT — COGNITIVE AND FUNCTIONAL STATUS - GENERAL
PATIENT BASELINE BEDBOUND: NO
DRESSING REGULAR UPPER BODY CLOTHING: A LITTLE
TOILETING: A LITTLE
DAILY ACTIVITIY SCORE: 21
TOILETING: A LITTLE
DRESSING REGULAR LOWER BODY CLOTHING: A LITTLE
DAILY ACTIVITIY SCORE: 19
DRESSING REGULAR LOWER BODY CLOTHING: A LITTLE
TURNING FROM BACK TO SIDE WHILE IN FLAT BAD: A LITTLE
MOVING FROM LYING ON BACK TO SITTING ON SIDE OF FLAT BED WITH BEDRAILS: A LITTLE
MOBILITY SCORE: 22
WALKING IN HOSPITAL ROOM: A LITTLE
HELP NEEDED FOR BATHING: A LITTLE
CLIMB 3 TO 5 STEPS WITH RAILING: A LOT
STANDING UP FROM CHAIR USING ARMS: A LITTLE
PERSONAL GROOMING: A LITTLE
WALKING IN HOSPITAL ROOM: A LITTLE
MOVING TO AND FROM BED TO CHAIR: A LITTLE
CLIMB 3 TO 5 STEPS WITH RAILING: A LITTLE
MOBILITY SCORE: 17
PERSONAL GROOMING: A LITTLE

## 2024-09-25 ASSESSMENT — PAIN SCALES - GENERAL
PAIN_LEVEL: 4
PAINLEVEL_OUTOF10: 7
PAINLEVEL_OUTOF10: 3
PAINLEVEL_OUTOF10: 0 - NO PAIN
PAINLEVEL_OUTOF10: 7
PAINLEVEL_OUTOF10: 4
PAINLEVEL_OUTOF10: 0 - NO PAIN
PAINLEVEL_OUTOF10: 7
PAINLEVEL_OUTOF10: 7

## 2024-09-25 ASSESSMENT — PAIN DESCRIPTION - LOCATION
LOCATION: OTHER (COMMENT)
LOCATION: OTHER (COMMENT)

## 2024-09-25 ASSESSMENT — PAIN DESCRIPTION - DESCRIPTORS
DESCRIPTORS: SORE
DESCRIPTORS: SORE

## 2024-09-25 ASSESSMENT — PAIN DESCRIPTION - ORIENTATION
ORIENTATION: RIGHT
ORIENTATION: RIGHT

## 2024-09-25 ASSESSMENT — LIFESTYLE VARIABLES
HOW OFTEN DO YOU HAVE 6 OR MORE DRINKS ON ONE OCCASION: NEVER
AUDIT-C TOTAL SCORE: 3
HOW MANY STANDARD DRINKS CONTAINING ALCOHOL DO YOU HAVE ON A TYPICAL DAY: 1 OR 2
SKIP TO QUESTIONS 9-10: 1
AUDIT-C TOTAL SCORE: 3
HOW OFTEN DO YOU HAVE A DRINK CONTAINING ALCOHOL: 2-3 TIMES A WEEK

## 2024-09-25 NOTE — BRIEF OP NOTE
Date: 2024  OR Location: [facility] OR    Name: Jayson Elizalde, : 1940, Age: 84 y.o., MRN: [id number], Sex: male    Diagnosis  Pre-op Diagnosis      * Primary adenocarcinoma of lung, unspecified laterality (Multi) [C34.90] Post-op Diagnosis     * Primary adenocarcinoma of lung, unspecified laterality (Multi) [C34.90]     Procedures  Flexible bronchoscopy, right robotic assisted lower lobe wedge resection, mediastinal lymph node dissection, and intercostal nerve blocks    Surgeons      * Cliff Pleitez - Primary    Resident/Fellow/Other Assistant:  Surgeons and Role:     * KIN Hinton-C - ANTONIO First Assist    Procedure Summary  Anesthesia: General  ASA: III  Anesthesia Staff: Anesthesiologist: Leigha Wilburn MD  Anesthesia Resident: Kristopher Reynolds DO  Estimated Blood Loss: 10mL  Intra-op Medications:   Administrations occurring from 0745 to 1050 on 24:   Medication Name Total Dose   BUPivacaine-EPINEPHrine (Marcaine w/EPI) 0.25 %-1:200,000 injection 30 mL   fentaNYL PF (Sublimaze) injection  - Omnicell Override Pull Cannot be calculated   propofol (Diprivan) injection  - Omnicell Override Pull Cannot be calculated            Anesthesia Record               Intraprocedure I/O Totals       None           Specimen:   ID Type Source Tests Collected by Time   1 : STATION 11 LYMPH NODE Tissue LYMPH NODE PULMONARY (SPECIFY SITE) SURGICAL PATHOLOGY EXAM Cliff Pleitez MD 2024 0904   2 : RIGHT LOWER LOBE WEDGE Tissue LUNG WEDGE RESECTION RIGHT (SPECIFY SITE) SURGICAL PATHOLOGY EXAM Cliff Pleitez MD 2024 0914   3 : STATION 9 LYMPH NODE Tissue LYMPH NODE PULMONARY (SPECIFY SITE) SURGICAL PATHOLOGY EXAM Cliff Pleitez MD 2024 0921   4 : STATION 10 LYMPH NODE Tissue LYMPH NODE PULMONARY (SPECIFY SITE) SURGICAL PATHOLOGY EXAM Cliff Pleitez MD 2024 0925   5 : STATION 7 LYMPH NODE Tissue LYMPH NODE PULMONARY (SPECIFY SITE) SURGICAL PATHOLOGY  EXAM Cliff Pleitez MD 9/25/2024 0930   6 : STATION 4R LYMPH NODE Tissue LYMPH NODE PULMONARY (SPECIFY SITE) SURGICAL PATHOLOGY EXAM Cliff Pleitez MD 9/25/2024 0935      Staff:   Kirstyulator: Robby  Circulator: Corby Carbajal Person: Femi Carbajal Person: Rosetta    Findings: Frozen pathology of the right lower lobe wedge resection consistent with adenocarcinoma. Negative margins. See Dr. Grullon's operative note for additional details.    Complications:  None; patient tolerated the procedure well.     Disposition: PACU - hemodynamically stable.  Condition: stable  Specimens Collected:   ID Type Source Tests Collected by Time   1 : STATION 11 LYMPH NODE Tissue LYMPH NODE PULMONARY (SPECIFY SITE) SURGICAL PATHOLOGY EXAM Cliff Pleitez MD 9/25/2024 0904   2 : RIGHT LOWER LOBE WEDGE Tissue LUNG WEDGE RESECTION RIGHT (SPECIFY SITE) SURGICAL PATHOLOGY EXAM Cliff Pleitez MD 9/25/2024 0914   3 : STATION 9 LYMPH NODE Tissue LYMPH NODE PULMONARY (SPECIFY SITE) SURGICAL PATHOLOGY EXAM Cliff Pleitez MD 9/25/2024 0921   4 : STATION 10 LYMPH NODE Tissue LYMPH NODE PULMONARY (SPECIFY SITE) SURGICAL PATHOLOGY EXAM Cliff Pleitez MD 9/25/2024 0925   5 : STATION 7 LYMPH NODE Tissue LYMPH NODE PULMONARY (SPECIFY SITE) SURGICAL PATHOLOGY EXAM Cliff Pleitez MD 9/25/2024 0930   6 : STATION 4R LYMPH NODE Tissue LYMPH NODE PULMONARY (SPECIFY SITE) SURGICAL PATHOLOGY EXAM Cliff Pleitez MD 9/25/2024 0919     I was the bedside assistant in the robotic assisted right lower lobe wedge resection and mediastinal lymph node dissection with Dr. Grullon.     Priti Bryson PA-C

## 2024-09-25 NOTE — ANESTHESIA PROCEDURE NOTES
Peripheral IV  Date/Time: 9/25/2024 8:35 AM      Placement  Needle size: 16 G  Laterality: right  Location: forearm  Local anesthetic: none  Site prep: alcohol  Technique: anatomical landmarks  Attempts: 1

## 2024-09-25 NOTE — PERIOPERATIVE NURSING NOTE
1015 Pt arrived to PACU, alert and able to answer questions and follow commands. Pt shows no signs of distress.       1030 Pacer technician at bedside to reset pt pacemaker. Pt stable.     1045 x-ray at bedside.     1055 Thoracic paged and viewed x-ray. Pt ok to go to floor and travel to water seal.     1110 Report called to nurse on [facility] tower 3. Pt stable.       1120 Pt stable. Transport requested.     1133 Pt in route to room on [address] tower 3 will all personal belongings. Pt stable.         Kristin Young, RN

## 2024-09-25 NOTE — OP NOTE
Resection Robot-Assisted Lung Segment; Resection Robot-Assisted Lung Lobe [id number], Excision Tissue Robot-Assisted Mediastinum [id number] (R) Operative Note     Date: 2024  OR Location: [facility] OR    Name: Jayson Elizalde, : 1940, Age: 84 y.o., MRN: [id number], Sex: male    Diagnosis  Pre-op Diagnosis      * Primary adenocarcinoma of lung, unspecified laterality (Multi) [C34.90] Post-op Diagnosis     * Primary adenocarcinoma of lung, unspecified laterality (Multi) [C34.90]     Procedures  Resection Robot-Assisted Lung Segment; Resection Robot-Assisted Lung Lobe [id number], Excision Tissue Robot-Assisted Mediastinum [id number]  [id number] - SC THORACOSCOPY W/SEGMENTECTOMY      Surgeons      * Cliff Pleitez - Primary    Resident/Fellow/Other Assistant:  Surgeons and Role:     * Priti Bryson PA-C - ANTONIO First Assist    Procedure Summary  Anesthesia: General  ASA: III  Anesthesia Staff: Anesthesiologist: Leigha Wilburn MD  Anesthesia Resident: Kristopher Reynolds DO  Estimated Blood Loss: 10mL  Intra-op Medications:   Administrations occurring from 0745 to 1050 on 24:   Medication Name Total Dose   BUPivacaine-EPINEPHrine (Marcaine w/EPI) 0.25 %-1:200,000 injection 30 mL   HYDROmorphone (Dilaudid) injection 0.5 mg 0.5 mg   lactated Ringer's infusion 58.33 mL   oxygen (O2) therapy 140 L   fentaNYL PF (Sublimaze) injection  - Omnicell Override Pull Cannot be calculated   methocarbamol (Robaxin) injection 1,000 mg 1,000 mg   propofol (Diprivan) injection  - Omnicell Override Pull Cannot be calculated              Anesthesia Record               Intraprocedure I/O Totals       None           Specimen:   ID Type Source Tests Collected by Time   1 : STATION 11 LYMPH NODE Tissue LYMPH NODE PULMONARY (SPECIFY SITE) SURGICAL PATHOLOGY EXAM Cliff Pleitez MD 2024 0904   2 : RIGHT LOWER LOBE WEDGE Tissue LUNG WEDGE RESECTION RIGHT (SPECIFY SITE) SURGICAL PATHOLOGY EXAM Cliff Pleitez MD  9/25/2024 0914   3 : STATION 9 LYMPH NODE Tissue LYMPH NODE PULMONARY (SPECIFY SITE) SURGICAL PATHOLOGY EXAM Cliff Pleitez MD 9/25/2024 0921   4 : STATION 10 LYMPH NODE Tissue LYMPH NODE PULMONARY (SPECIFY SITE) SURGICAL PATHOLOGY EXAM Cliff Pleitez MD 9/25/2024 0925   5 : STATION 7 LYMPH NODE Tissue LYMPH NODE PULMONARY (SPECIFY SITE) SURGICAL PATHOLOGY EXAM Cliff Pleitez MD 9/25/2024 0930   6 : STATION 4R LYMPH NODE Tissue LYMPH NODE PULMONARY (SPECIFY SITE) SURGICAL PATHOLOGY EXAM Cliff Pleitez MD 9/25/2024 0935        Staff:   Circulator: Robby  Circulator: Corby Carbajal Person: Femi Alasub Person: Rosetta         Drains and/or Catheters:   Chest Tube 1 Right Pleural 28 Fr (Active)       Tourniquet Times:         Implants:     Findings: Non-small cell lung cancer on frozen section negative margins    Indications: Jayson Elizalde is an 84 y.o. male who is having surgery for Primary adenocarcinoma of lung, unspecified laterality (Multi) [C34.90].  Biopsy-proven adenocarcinoma here for resection.    The patient was seen in the preoperative area. The risks, benefits, complications, treatment options, non-operative alternatives, expected recovery and outcomes were discussed with the patient. The possibilities of reaction to medication, pulmonary aspiration, injury to surrounding structures, bleeding, recurrent infection, the need for additional procedures, failure to diagnose a condition, and creating a complication requiring transfusion or operation were discussed with the patient. The patient concurred with the proposed plan, giving informed consent.  The site of surgery was properly noted/marked if necessary per policy. The patient has been actively warmed in preoperative area. Preoperative antibiotics have been ordered and given within 1 hours of incision. Venous thrombosis prophylaxis have been ordered including bilateral sequential compression devices and chemical  prophylaxis    Procedure Details: After identifying the patient in the preoperative area, the patient was brought to the room.  Patient placed in the supine position.  Timeout performed.  General anesthesia induced with a double-lumen tube.  Flexible bronchoscopy performed to assess position of endotracheal tube and also to assess the airway.  No endobronchial lesions were identified.  Patient placed in the left lateral decubitus with the right side up.  All bony prominences were padded.  Chest was prepped and draped in surgical fashion.  A 1 cm incision was performed in the eighth intercostal space with midaxillary line.  After gaining access to the chest cavity, multilevel rib blocks with Marcaine were done under direct visualization.  1 to 2 cc of quarter percent Marcaine were injected into each interspace from spaces 3-8.  Additional ports were placed in the same interspace 1 anterior and 2 posterior.  An assist port was placed between arms 2 and 3.  Robot was docked. I proceeded to scrub out and moved to the console.  Nodule in the right lower lobe was identified.  This was really close to the fissure.  Fissure dissection was necessary.  An 11 lymph node was identified and a very of the PA and it was dissected and sent for permanent section.  Using a combination of the black load of the robotic stapler proceeded to wedge out this lesion without complications.  Specimen removed in the Endo Catch bag.  This was sent for frozen section.  In the meanwhile, attention was turned to the inferior pulmonary ligament which was dissected and Station 9 lymph nodes were harvested and sent for permanent section.  The dissection was taken posteriorly to the level of the hilum and also the subcarinal space lymph nodes were harvested and sent for permanent section.  Station 4R lymph nodes harvested and sent for permanent section.  Frozen section revealing adenocarcinoma with negative margins. Hemostasis was achieved at all  times.  28 Yi chest tube placed.  Robot undocked.  Lung insufflated under direct visualization.  All wounds were closed in layers.  Patient tolerated procedure well and transferred to the postanesthesia care unit in adequate conditions.  All counts were correct.  No qualified resident or fellow to bedside assist during this robotic case of Priti Bryson acted as a first assistant during this robotic case.  Complications:  None; patient tolerated the procedure well.    Disposition: PACU - hemodynamically stable.  Condition: stable       Attending Attestation: I was present and scrubbed for the entire procedure.    Cliff Pleitez  Phone Number: [phone]

## 2024-09-25 NOTE — ANESTHESIA PROCEDURE NOTES
Airway  Date/Time: 9/25/2024 8:32 AM  Urgency: elective    Airway not difficult    Staffing  Performed: resident   Authorized by: Leigha Wilburn MD    Performed by: Kristopher Reynolds DO  Patient location during procedure: OR    Indications and Patient Condition  Indications for airway management: anesthesia  Spontaneous Ventilation: absent  Sedation level: deep  Preoxygenated: yes  Patient position: sniffing  Mask difficulty assessment: 3 - difficult mask (inadequate, unstable or two providers) +/- NMBA  Planned trial extubation    Final Airway Details  Final airway type: endotracheal airway      Successful airway: ETT and ETT - double lumen left  Cuffed: yes   Successful intubation technique: video laryngoscopy  Facilitating devices/methods: intubating stylet and cricoid pressure  Endotracheal tube insertion site: oral  Blade: Sudha  Blade size: #4  ETT DL size (fr): 39  Cormack-Lehane Classification: grade I - full view of glottis  Placement verified by: chest auscultation and capnometry   Measured from: lips  Number of attempts at approach: 1

## 2024-09-25 NOTE — ADDENDUM NOTE
Addendum  created 09/25/24 1520 by Kristopher Reynolds, DO    Intraprocedure Meds edited, Narcotic reconciliation edited

## 2024-09-25 NOTE — INTERVAL H&P NOTE
H&P reviewed. The patient was examined and there are no changes to the H&P.    Took his acetabulol and statin this AM.  Drinks a glass of wine nightly with dinner.

## 2024-09-25 NOTE — ANESTHESIA PROCEDURE NOTES
Arterial Line:    Date/Time: 9/25/2024 8:35 AM    Staffing  Performed: resident   Authorized by: Leigha Wilburn MD    Performed by: Kristopher Reynolds DO    An arterial line was placed. Procedure performed using surface landmarks.in the OR for the following indication(s): continuous blood pressure monitoring.    A 20 gauge (size), 1 and 3/4 inch (length), Angiocath (type) catheter was placed into the Left radial artery, secured by Tegaderm,   Seldinger technique not used.  Events:  patient tolerated procedure well with no complications.

## 2024-09-25 NOTE — ANESTHESIA PREPROCEDURE EVALUATION
Patient: Jayson Elizalde    Procedure Information       Date/Time: 09/25/24 0745    Procedure: Resection Robot-Assisted Lung Segment; Resection Robot-Assisted Lung Lobe [id number], Excision Tissue Robot-Assisted Mediastinum [id number] (Right: Chest) - Robotic assisted right lower lobe superior segmentectomy with mediastinal lymphadenectomy; ROBOT approved by Zoraida James.    Location: [facility] OR 14 / [facility] OR    Surgeons: Cliff Pleitez MD            Relevant Problems   Anesthesia (within normal limits)      Cardiac   (+) Hyperlipidemia   (+) Hypertension      Pulmonary   (+) Adenocarcinoma, lung (Multi)   (+) Non-small cell cancer of right lung (Multi)   (+) Primary adenocarcinoma of lung (Multi)   (+) Pulmonary emphysema (Multi)      Neuro   (+) Carpal tunnel syndrome, right   (+) Cubital tunnel syndrome on right      /Renal   (+) BPH (benign prostatic hyperplasia)      Musculoskeletal   (+) Carpal tunnel syndrome, right   (+) Cervical spondylosis   (+) Degeneration of cervical intervertebral disc   (+) Primary osteoarthritis of right hand       Clinical information reviewed:   Tobacco  Allergies  Meds   Med Hx  Surg Hx   Fam Hx  Soc Hx        NPO Detail:  NPO/Void Status  Date of Last Liquid: 09/25/24  Time of Last Liquid: 0500  Date of Last Solid: 09/24/24  Time of Last Solid: 1900         Physical Exam    Airway  Mallampati: III  TM distance: >3 FB     Cardiovascular - normal exam     Dental   (+) upper dentures, lower dentures     Pulmonary - normal exam     Abdominal          Anesthesia Plan    History of general anesthesia?: yes  History of complications of general anesthesia?: no    ASA 3     general     Anesthetic plan and risks discussed with patient.  Use of blood products discussed with patient who.    Plan discussed with resident.

## 2024-09-25 NOTE — ANESTHESIA POSTPROCEDURE EVALUATION
Patient: Jayson Elizalde    Procedure Summary       Date: 09/25/24 Room / Location: [facility] OR 14 / [facility]    Anesthesia Start: 0821 Anesthesia Stop:     Procedure: Resection Robot-Assisted Lung Segment; Resection Robot-Assisted Lung Lobe [id number], Excision Tissue Robot-Assisted Mediastinum [id number] (Right: Chest) Diagnosis:       Primary adenocarcinoma of lung, unspecified laterality (Multi)      (Primary adenocarcinoma of lung, unspecified laterality (Multi) [C34.90])    Surgeons: Cliff Pleitez MD Responsible Provider: Leigha Wilburn MD    Anesthesia Type: general ASA Status: 3            Anesthesia Type: general    Vitals Value Taken Time   /80 09/25/24 1100   Temp 36.3 °C (97.3 °F) 09/25/24 1015   Pulse 65 09/25/24 1106   Resp 6 09/25/24 1106   SpO2 93 % 09/25/24 1106   Vitals shown include unfiled device data.    Anesthesia Post Evaluation    Patient location during evaluation: bedside  Patient participation: complete - patient participated  Level of consciousness: awake and alert  Pain score: 4  Pain management: adequate  Airway patency: patent  Cardiovascular status: acceptable and hemodynamically stable  Respiratory status: acceptable and face mask  Hydration status: acceptable  Postoperative Nausea and Vomiting: none        No notable events documented.

## 2024-09-26 ENCOUNTER — APPOINTMENT (OUTPATIENT)
Dept: RADIOLOGY | Facility: HOSPITAL | Age: 84
End: 2024-09-26
Payer: MEDICARE

## 2024-09-26 LAB
ANION GAP SERPL CALC-SCNC: 13 MMOL/L (ref 10–20)
BUN SERPL-MCNC: 17 MG/DL (ref 6–23)
CALCIUM SERPL-MCNC: 8.9 MG/DL (ref 8.6–10.6)
CHLORIDE SERPL-SCNC: 97 MMOL/L (ref 98–107)
CO2 SERPL-SCNC: 29 MMOL/L (ref 21–32)
CREAT SERPL-MCNC: 0.93 MG/DL (ref 0.5–1.3)
EGFRCR SERPLBLD CKD-EPI 2021: 82 ML/MIN/1.73M*2
ERYTHROCYTE [DISTWIDTH] IN BLOOD BY AUTOMATED COUNT: 13.6 % (ref 11.5–14.5)
GLUCOSE SERPL-MCNC: 126 MG/DL (ref 74–99)
HCT VFR BLD AUTO: 40.3 % (ref 41–52)
HGB BLD-MCNC: 13.1 G/DL (ref 13.5–17.5)
MAGNESIUM SERPL-MCNC: 1.9 MG/DL (ref 1.6–2.4)
MCH RBC QN AUTO: 30.1 PG (ref 26–34)
MCHC RBC AUTO-ENTMCNC: 32.5 G/DL (ref 32–36)
MCV RBC AUTO: 93 FL (ref 80–100)
NRBC BLD-RTO: 0 /100 WBCS (ref 0–0)
PLATELET # BLD AUTO: 187 X10*3/UL (ref 150–450)
POTASSIUM SERPL-SCNC: 4.4 MMOL/L (ref 3.5–5.3)
RBC # BLD AUTO: 4.35 X10*6/UL (ref 4.5–5.9)
SODIUM SERPL-SCNC: 135 MMOL/L (ref 136–145)
WBC # BLD AUTO: 8.4 X10*3/UL (ref 4.4–11.3)

## 2024-09-26 PROCEDURE — 2500000001 HC RX 250 WO HCPCS SELF ADMINISTERED DRUGS (ALT 637 FOR MEDICARE OP): Performed by: NURSE PRACTITIONER

## 2024-09-26 PROCEDURE — 1200000002 HC GENERAL ROOM WITH TELEMETRY DAILY

## 2024-09-26 PROCEDURE — 2500000001 HC RX 250 WO HCPCS SELF ADMINISTERED DRUGS (ALT 637 FOR MEDICARE OP): Performed by: PHYSICIAN ASSISTANT

## 2024-09-26 PROCEDURE — 85027 COMPLETE CBC AUTOMATED: CPT | Performed by: PHYSICIAN ASSISTANT

## 2024-09-26 PROCEDURE — 80048 BASIC METABOLIC PNL TOTAL CA: CPT | Performed by: PHYSICIAN ASSISTANT

## 2024-09-26 PROCEDURE — 36415 COLL VENOUS BLD VENIPUNCTURE: CPT | Performed by: PHYSICIAN ASSISTANT

## 2024-09-26 PROCEDURE — 71045 X-RAY EXAM CHEST 1 VIEW: CPT | Performed by: RADIOLOGY

## 2024-09-26 PROCEDURE — 2500000005 HC RX 250 GENERAL PHARMACY W/O HCPCS: Performed by: PHYSICIAN ASSISTANT

## 2024-09-26 PROCEDURE — 97165 OT EVAL LOW COMPLEX 30 MIN: CPT | Mod: GO

## 2024-09-26 PROCEDURE — 2500000005 HC RX 250 GENERAL PHARMACY W/O HCPCS: Performed by: NURSE PRACTITIONER

## 2024-09-26 PROCEDURE — 83735 ASSAY OF MAGNESIUM: CPT | Performed by: PHYSICIAN ASSISTANT

## 2024-09-26 PROCEDURE — 97161 PT EVAL LOW COMPLEX 20 MIN: CPT | Mod: GP

## 2024-09-26 PROCEDURE — 71045 X-RAY EXAM CHEST 1 VIEW: CPT

## 2024-09-26 PROCEDURE — 2500000004 HC RX 250 GENERAL PHARMACY W/ HCPCS (ALT 636 FOR OP/ED): Performed by: NURSE PRACTITIONER

## 2024-09-26 PROCEDURE — 2500000004 HC RX 250 GENERAL PHARMACY W/ HCPCS (ALT 636 FOR OP/ED): Mod: JZ | Performed by: PHYSICIAN ASSISTANT

## 2024-09-26 RX ORDER — IPRATROPIUM BROMIDE AND ALBUTEROL SULFATE 2.5; .5 MG/3ML; MG/3ML
3 SOLUTION RESPIRATORY (INHALATION) EVERY 6 HOURS PRN
Status: DISCONTINUED | OUTPATIENT
Start: 2024-09-26 | End: 2024-09-27 | Stop reason: HOSPADM

## 2024-09-26 RX ORDER — LIDOCAINE 560 MG/1
1 PATCH PERCUTANEOUS; TOPICAL; TRANSDERMAL DAILY
Status: DISCONTINUED | OUTPATIENT
Start: 2024-09-26 | End: 2024-09-27 | Stop reason: HOSPADM

## 2024-09-26 RX ORDER — IPRATROPIUM BROMIDE AND ALBUTEROL SULFATE 2.5; .5 MG/3ML; MG/3ML
3 SOLUTION RESPIRATORY (INHALATION)
Status: DISCONTINUED | OUTPATIENT
Start: 2024-09-26 | End: 2024-09-26

## 2024-09-26 RX ORDER — GUAIFENESIN 600 MG/1
600 TABLET, EXTENDED RELEASE ORAL 2 TIMES DAILY
Status: DISCONTINUED | OUTPATIENT
Start: 2024-09-26 | End: 2024-09-27 | Stop reason: HOSPADM

## 2024-09-26 RX ORDER — ACETAMINOPHEN 325 MG/1
650 TABLET ORAL EVERY 6 HOURS
Status: DISCONTINUED | OUTPATIENT
Start: 2024-09-26 | End: 2024-09-27 | Stop reason: HOSPADM

## 2024-09-26 ASSESSMENT — COGNITIVE AND FUNCTIONAL STATUS - GENERAL
MOBILITY SCORE: 18
DAILY ACTIVITIY SCORE: 22
MOVING FROM LYING ON BACK TO SITTING ON SIDE OF FLAT BED WITH BEDRAILS: A LITTLE
DAILY ACTIVITIY SCORE: 21
STANDING UP FROM CHAIR USING ARMS: A LITTLE
MOVING TO AND FROM BED TO CHAIR: A LITTLE
DRESSING REGULAR LOWER BODY CLOTHING: A LITTLE
WALKING IN HOSPITAL ROOM: A LITTLE
TOILETING: A LITTLE
PERSONAL GROOMING: A LITTLE
TURNING FROM BACK TO SIDE WHILE IN FLAT BAD: A LITTLE
MOBILITY SCORE: 22
WALKING IN HOSPITAL ROOM: A LITTLE
CLIMB 3 TO 5 STEPS WITH RAILING: A LITTLE
CLIMB 3 TO 5 STEPS WITH RAILING: A LITTLE
HELP NEEDED FOR BATHING: A LITTLE
TOILETING: A LITTLE

## 2024-09-26 ASSESSMENT — PAIN - FUNCTIONAL ASSESSMENT
PAIN_FUNCTIONAL_ASSESSMENT: 0-10

## 2024-09-26 ASSESSMENT — PAIN SCALES - GENERAL
PAINLEVEL_OUTOF10: 7
PAINLEVEL_OUTOF10: 7
PAINLEVEL_OUTOF10: 3
PAINLEVEL_OUTOF10: 6
PAINLEVEL_OUTOF10: 5 - MODERATE PAIN
PAINLEVEL_OUTOF10: 0 - NO PAIN
PAINLEVEL_OUTOF10: 7
PAINLEVEL_OUTOF10: 7
PAINLEVEL_OUTOF10: 2
PAINLEVEL_OUTOF10: 0 - NO PAIN

## 2024-09-26 ASSESSMENT — PAIN DESCRIPTION - LOCATION
LOCATION: RIB CAGE

## 2024-09-26 ASSESSMENT — ACTIVITIES OF DAILY LIVING (ADL)
ADL_ASSISTANCE: INDEPENDENT
BATHING_ASSISTANCE: STAND BY
ADL_ASSISTANCE: INDEPENDENT

## 2024-09-26 ASSESSMENT — PAIN DESCRIPTION - ORIENTATION
ORIENTATION: RIGHT

## 2024-09-26 ASSESSMENT — PAIN DESCRIPTION - DESCRIPTORS
DESCRIPTORS: ACHING;DISCOMFORT
DESCRIPTORS: ACHING;SORE
DESCRIPTORS: ACHING;SORE

## 2024-09-26 NOTE — PROGRESS NOTES
"Thoracic Surgery Progress Note  9/26/2024    Jayson Elizalde is a 84 y.o. male now 1 Day Post-Op status post Flexible bronchoscopy, right robotic assisted lower lobe wedge resection, mediastinal lymph node dissection, and intercostal nerve blocks for a biopsy proven adenocarcinoma.     Intra-op Findings: Non-small cell lung cancer on frozen section negative margins     Overnight issues: CT placed to WS at MN, subjective SOB, CXR preformed and CT placed back to suction.  This am, chest tube placed back to WS.  Still requiring 2L NC oxygen.     Physical Exam:  General: He is a pleasant male currently in no distress,seen sitting up on the side of the bed.   Visit Vitals  /61 (BP Location: Right arm, Patient Position: Lying)   Pulse 65   Temp 37.1 °C (98.8 °F) (Temporal)   Resp 20   Ht 1.702 m (5' 7\")   Wt 91.4 kg (201 lb 8 oz)   SpO2 93%   BMI 31.56 kg/m²   Smoking Status Former   BSA 2.08 m²     Body mass index is 31.56 kg/m².   HEENT: Normocephalic and atraumatic.   NECK: Supple. Trach midline. No JVD.   CHEST: Breathing comfortably on 2L NC.  Chest tube to WS, few bubbles with initial talking and cough, reproducible  HEART: Regular rate and rhythm. NSR per tele review.   ABDOMEN: Soft, flat, nontender.   : voiding   NEUROLOGIC: Alert and oriented. Grossly intact.   EXTREMITIES: Moves all extremities equally.  Pedal pulses are palpable. No lower extremity edema. No calf tenderness.     Diagnostics:     Intake/Output Summary (Last 24 hours) at 9/26/2024 0925  Last data filed at 9/26/2024 0431  Gross per 24 hour   Intake 2060 ml   Output 333 ml   Net 1727 ml     Results from last 7 days   Lab Units 09/26/24  0723   WBC AUTO x10*3/uL 8.4   HEMOGLOBIN g/dL 13.1*   HEMATOCRIT % 40.3*   PLATELETS AUTO x10*3/uL 187     Results from last 7 days   Lab Units 09/26/24  0723   SODIUM mmol/L 135*   POTASSIUM mmol/L 4.4   CHLORIDE mmol/L 97*   CO2 mmol/L 29   BUN mg/dL 17   CREATININE mg/dL 0.93   GLUCOSE mg/dL 126* "   CALCIUM mg/dL 8.9     Results from last 7 days   Lab Units 09/26/24  0723   SODIUM mmol/L 135*   POTASSIUM mmol/L 4.4   CHLORIDE mmol/L 97*   CO2 mmol/L 29   BUN mg/dL 17   CREATININE mg/dL 0.93   GLUCOSE mg/dL 126*   CALCIUM mg/dL 8.9     Scheduled medications  acebutolol, 200 mg, oral, BID  acetaminophen, 650 mg, oral, q6h  aspirin, 81 mg, oral, Daily  tiotropium, 2 puff, inhalation, Daily   And  fluticasone furoate-vilanteroL, 1 puff, inhalation, Daily  guaiFENesin, 600 mg, oral, BID  heparin (porcine), 5,000 Units, subcutaneous, q8h  ipratropium-albuteroL, 3 mL, nebulization, q6h  rosuvastatin, 10 mg, oral, Once per day on Monday Wednesday Friday  sennosides-docusate sodium, 2 tablet, oral, BID  sugammadex, , ,       Continuous medications     PRN medications  PRN medications: naloxone, ondansetron, oxyCODONE, oxyCODONE, oxygen, sugammadex    Imaging:   AM CXR reviewed. Expected post op changes and chest tube placement. No clinically significant pneumothorax. No formal report at this time.     Assessment:  Jayson Elizalde is a 84 y.o. male status post Flexible bronchoscopy, right robotic assisted lower lobe wedge resection, mediastinal lymph node dissection, and intercostal nerve blocks for a biopsy proven adenocarcinoma.  Satisfactory post op course.     Plan:  Neurology: post operative pain  -continue oral regimen of pain control with oxycodone and Tylenol.  -Bowel regimen available for constipation secondary to pain medication   -Out of bed to the chair throughout the day  -Encourage ambulation as tolerated  - add lidocaine patches    Cardiovascular:   hx HTN, CAD, pAfib s/p watchman, hx RBB  -Continue telemetry  -Vital signs every four hours  -Continue home acebutolol at home dosing  -Continue home regimen of asa  -Replace electrolytes as needed for K >4, Mg >2    Pulmonology: post op atelectasis, chest tube management, COPD. Previous 20 py smoker-quit 40 years ago.   -Encourage incentive spirometer use  every hour  -Continue pulmonary hygiene  -Wean oxygen as tolerated   -Maintain chest tube to WS  -Obtain daily CXR  -Monitor and record chest tube drainage every shift  - add mucinex and aggressive pulm toilet  - scheduled duonebs   - continue breo ellipta/spirva in place of home trelegy    Gastrointestinal: hx HLD   - regular diet as tolerated  - Zofran available for nausea  - scheduled colace, PRN bowel regimen    Genitourinary:   -no rodriguez post op, voiding   -Continue to monitor daily electrolytes with routine BMPs   -Adequate urine output    Infectious Disease:   -Continue to trend daily temperatures and WBC count to monitor for signs of post-operative infection   -Monitor surgical incisions for signs of infection   -Perioperative antibiotics completed     Hematology:   -Monitor for signs of acute blood loss  -Trend CBCs     Endocrine:  no issues     DVT Prophylaxis:   -Continue subcutaneous heparin and SCDs, early ambulation    Disposition:  -Plan for discharge to home once stable from a surgical standpoint.  -Continue to assess for home-going needs     Patient seen and examined by this provider. Plan of care discussed with attending of record, Dr. Yadi Díaz, APRN-CNP

## 2024-09-26 NOTE — CARE PLAN
The patient's goals for the shift include  pain control     The clinical goals for the shift include pt will remian HDS throughout the shift

## 2024-09-26 NOTE — PROGRESS NOTES
Physical Therapy    Physical Therapy Evaluation    Patient Name: Jayson Elizalde  MRN: [id number]  Department: [place] [id number]  Room: [address] [place] [address]  Today's Date: 9/26/2024   Time Calculation  Start Time: 1553  Stop Time: 1617  Time Calculation (min): 24 min    Assessment/Plan   PT Assessment  PT Assessment Results: Decreased strength, Decreased endurance, Impaired balance, Decreased mobility  Rehab Prognosis: Good  Barriers to Discharge: none  Evaluation/Treatment Tolerance: Patient tolerated treatment well  Medical Staff Made Aware: Yes  Strengths: Ability to acquire knowledge, Attitude of self, Access to adaptive/assistive products, Insight into problems, Premorbid level of function, Support of extended family/friends  End of Session Communication: Bedside nurse  Assessment Comment: Pt. is a 84 yom that presents with decreased functional strength, decreased activity tolerance/endurance, mildly impaired balance, and increased difficulty with functional mobility compared to baseline level of function. Pt. will benefit from skilled PT intervention while inpatient to address the above deficits and maximize return to PLOF. Anticipate pt to have no PT needs upon discharge.  End of Session Patient Position: Bed, 2 rail up, Alarm off, not on at start of session (Pt seated EOB, call light in reach)    IP OR SWING BED PT PLAN  Inpatient or Swing Bed: Inpatient  PT Plan  Treatment/Interventions: Bed mobility, Transfer training, Gait training, Stair training, Balance training, Strengthening, Endurance training, Therapeutic exercise, Therapeutic activity, Home exercise program  PT Plan: Ongoing PT  PT Frequency: 3 times per week  PT Discharge Recommendations: No PT needed after discharge  PT Recommended Transfer Status: Stand by assist  PT - OK to Discharge: Yes      Subjective   General Visit Information:  General  Reason for Referral: S/p right robotic assisted lower lobe wedge resection on 9/25  Past Medical History Relevant to  Rehab: HTN, CAD, pAfib s/p watchman, hx RBB  Family/Caregiver Present: No  Prior to Session Communication: Bedside nurse  Patient Position Received: Bed, 2 rail up, Alarm off, not on at start of session  Preferred Learning Style: auditory, verbal  General Comment: Pt seated EOB, pleasant and agreeable to participate in session. (Chest tube)    Home Living:  Home Living  Type of Home: House  Lives With: Adult children (Daughter, son in law)  Home Adaptive Equipment: Cane, Walker rolling or standard  Home Layout: Multi-level, Bed/bath upstairs  Home Access: Stairs to enter without rails  Entrance Stairs-Number of Steps: 5  Bathroom Shower/Tub: Walk-in shower  Bathroom Equipment: Shower chair with back    Prior Level of Function:  Prior Function Per Pt/Caregiver Report  Level of [place]: Independent with ADLs and functional transfers, Independent with homemaking with ambulation  ADL Assistance: Independent  Homemaking Assistance: Independent  Ambulatory Assistance: Independent (no AD, denied falls)  Vocational:  (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 Seated rest break and cues for pursed lip breathing, recovered to 92-93%. RN aware     Objective   Pain:  Pain Assessment  Pain Assessment: 0-10  0-10 (Numeric) Pain Score: 0 - No pain    Cognition:  Cognition  Overall Cognitive Status: Within Functional Limits  Orientation Level: Oriented X4    General Assessments:  Activity Tolerance  Endurance: Tolerates 10 - 20 min exercise with multiple rests  Early Mobility/Exercise Safety Screen: Proceed with mobilization - No exclusion criteria  met    Sensation  Light Touch: No apparent deficits  Sensation Comment: Pt denied N/T    Postural Control  Postural Control: Within Functional Limits    Static Sitting Balance  Static Sitting-Level of Assistance: Close supervision  Dynamic Sitting Balance  Dynamic Sitting-Level of Assistance: Close supervision    Static Standing Balance  Static Standing-Level of Assistance: Close supervision  Dynamic Standing Balance  Dynamic Standing-Level of Assistance: Close supervision    Functional Assessments:  Bed Mobility  Bed Mobility: No (Pt seated EOB pre/post session)    Transfers  Transfer: Yes  Transfer 1  Transfer From 1: Sit to, Stand to  Transfer to 1: Stand, Sit  Technique 1: Sit to stand, Stand to sit  Transfer Device 1:  (none)  Transfer Level of Assistance 1: Close supervision  Trials/Comments 1: x3 trials, EOB and bench in hallway    Ambulation/Gait Training  Ambulation/Gait Training Performed: Yes  Ambulation/Gait Training 1  Surface 1: Level tile  Device 1: No device  Assistance 1: Close supervision  Quality of Gait 1:  (dec jaci, no acute LOB noted)  Comments/Distance (ft) 1: 150 ft x2 trials, seated rest break between trials 2/2 SOB    Stairs  Stairs: No    Extremity/Trunk Assessments:  RLE   RLE : Within Functional Limits  LLE   LLE : Within Functional Limits    Outcome Measures:  [facility] Basic Mobility  Turning from your back to your side while in a flat bed without using bedrails: A little  Moving from lying on your back to sitting on the side of a flat bed without using bedrails: A little  Moving to and from bed to chair (including a wheelchair): A little  Standing up from a chair using your arms (e.g. wheelchair or bedside chair): A little  To walk in hospital room: A little  Climbing 3-5 steps with railing: A little  Basic Mobility - Total Score: 18    Encounter Problems       Encounter Problems (Active)       Balance       Patient to demonstrate Sarah static and dynamic standing balance without LOB  with change of directions, no hesitancy, appropriate CHRISTINE and sequencing to complete functional task.        Start:  09/26/24    Expected End:  10/10/24            Pt will score >= 24/28 on Tinetti balance assessment to indicate low falls risk       Start:  09/26/24    Expected End:  10/10/24               Mobility       STG - Patient will ambulate >/= 300 ft Sarah with LRAD       Start:  09/26/24    Expected End:  10/10/24            Patient to ascend/descend >/= 15 stairs with HR and SBA to demo ability to safely traverse ARIAN        Start:  09/26/24    Expected End:  10/10/24            Patient will tolerate >/=30 minutes continuous activity with stable vital signs, RPE </=13/20, RPD </=3/10        Start:  09/26/24    Expected End:  10/10/24               PT Transfers       STG - Patient will perform bed mobility Sarah with HOB flat and no rails to simulate home set up       Start:  09/26/24    Expected End:  10/10/24            STG - Patient will transfer sit to and from stand Sarah with LRAD       Start:  09/26/24    Expected End:  10/10/24                   Education Documentation  Precautions, taught by Carlene Reina PT at 9/26/2024  4:32 PM.  Learner: Patient  Readiness: Acceptance  Method: Explanation, Demonstration  Response: Verbalizes Understanding, Demonstrated Understanding    Body Mechanics, taught by Carlene Reina PT at 9/26/2024  4:32 PM.  Learner: Patient  Readiness: Acceptance  Method: Explanation, Demonstration  Response: Verbalizes Understanding, Demonstrated Understanding    Mobility Training, taught by Carlene Reina PT at 9/26/2024  4:32 PM.  Learner: Patient  Readiness: Acceptance  Method: Explanation, Demonstration  Response: Verbalizes Understanding, Demonstrated Understanding    Education Comments  No comments found.          09/26/24 at 4:33 PM - Carlene Reina PT
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
## 2024-09-26 NOTE — PROGRESS NOTES
09/26/24 1013   Discharge Planning   Living Arrangements Children   Support Systems Children   Type of Residence Private residence   Who is requesting discharge planning? Provider   Expected Discharge Disposition Home   Does the patient need discharge transport arranged? No     Previous Home Care: none  DME: none  Pharmacy: [facility]  PCP: Dr. Vincent    Met with patient and introduced myself as Care Coordinator and member of the discharge planning team.  Pt was admitted 1 month ago with pneumothorax following a biopsy.   Pt is now s/p R lung segmentectomy. He plans to return home at time of discharge with assistance of his daughter if needed. He was independent in ADL's prior to hospitalization. No home care needs were identified at this time. Care Coordinator will continue to follow for home going needs.    normal (ped)...

## 2024-09-27 ENCOUNTER — APPOINTMENT (OUTPATIENT)
Dept: RADIOLOGY | Facility: HOSPITAL | Age: 84
End: 2024-09-27
Payer: MEDICARE

## 2024-09-27 VITALS
DIASTOLIC BLOOD PRESSURE: 80 MMHG | WEIGHT: 203.5 LBS | HEIGHT: 67 IN | SYSTOLIC BLOOD PRESSURE: 130 MMHG | BODY MASS INDEX: 31.94 KG/M2 | HEART RATE: 66 BPM | OXYGEN SATURATION: 95 % | TEMPERATURE: 98.1 F | RESPIRATION RATE: 16 BRPM

## 2024-09-27 LAB
ANION GAP SERPL CALC-SCNC: 15 MMOL/L (ref 10–20)
BUN SERPL-MCNC: 20 MG/DL (ref 6–23)
CALCIUM SERPL-MCNC: 9.1 MG/DL (ref 8.6–10.6)
CHLORIDE SERPL-SCNC: 99 MMOL/L (ref 98–107)
CO2 SERPL-SCNC: 25 MMOL/L (ref 21–32)
CREAT SERPL-MCNC: 0.88 MG/DL (ref 0.5–1.3)
EGFRCR SERPLBLD CKD-EPI 2021: 86 ML/MIN/1.73M*2
ERYTHROCYTE [DISTWIDTH] IN BLOOD BY AUTOMATED COUNT: 13.3 % (ref 11.5–14.5)
GLUCOSE SERPL-MCNC: 125 MG/DL (ref 74–99)
HCT VFR BLD AUTO: 44.3 % (ref 41–52)
HGB BLD-MCNC: 14.1 G/DL (ref 13.5–17.5)
MAGNESIUM SERPL-MCNC: 2.18 MG/DL (ref 1.6–2.4)
MCH RBC QN AUTO: 29.9 PG (ref 26–34)
MCHC RBC AUTO-ENTMCNC: 31.8 G/DL (ref 32–36)
MCV RBC AUTO: 94 FL (ref 80–100)
NRBC BLD-RTO: 0 /100 WBCS (ref 0–0)
PLATELET # BLD AUTO: 157 X10*3/UL (ref 150–450)
POTASSIUM SERPL-SCNC: 4.1 MMOL/L (ref 3.5–5.3)
RBC # BLD AUTO: 4.71 X10*6/UL (ref 4.5–5.9)
SODIUM SERPL-SCNC: 135 MMOL/L (ref 136–145)
WBC # BLD AUTO: 7.2 X10*3/UL (ref 4.4–11.3)

## 2024-09-27 PROCEDURE — 71045 X-RAY EXAM CHEST 1 VIEW: CPT

## 2024-09-27 PROCEDURE — 97530 THERAPEUTIC ACTIVITIES: CPT | Mod: GP

## 2024-09-27 PROCEDURE — 71045 X-RAY EXAM CHEST 1 VIEW: CPT | Performed by: RADIOLOGY

## 2024-09-27 PROCEDURE — 80048 BASIC METABOLIC PNL TOTAL CA: CPT | Performed by: PHYSICIAN ASSISTANT

## 2024-09-27 PROCEDURE — 2500000005 HC RX 250 GENERAL PHARMACY W/O HCPCS: Performed by: NURSE PRACTITIONER

## 2024-09-27 PROCEDURE — 83735 ASSAY OF MAGNESIUM: CPT | Performed by: PHYSICIAN ASSISTANT

## 2024-09-27 PROCEDURE — 85027 COMPLETE CBC AUTOMATED: CPT | Performed by: PHYSICIAN ASSISTANT

## 2024-09-27 PROCEDURE — 2500000004 HC RX 250 GENERAL PHARMACY W/ HCPCS (ALT 636 FOR OP/ED): Performed by: NURSE PRACTITIONER

## 2024-09-27 PROCEDURE — 2500000001 HC RX 250 WO HCPCS SELF ADMINISTERED DRUGS (ALT 637 FOR MEDICARE OP): Performed by: NURSE PRACTITIONER

## 2024-09-27 PROCEDURE — 36415 COLL VENOUS BLD VENIPUNCTURE: CPT | Performed by: PHYSICIAN ASSISTANT

## 2024-09-27 PROCEDURE — 2500000001 HC RX 250 WO HCPCS SELF ADMINISTERED DRUGS (ALT 637 FOR MEDICARE OP): Performed by: PHYSICIAN ASSISTANT

## 2024-09-27 PROCEDURE — 2500000004 HC RX 250 GENERAL PHARMACY W/ HCPCS (ALT 636 FOR OP/ED): Performed by: PHYSICIAN ASSISTANT

## 2024-09-27 RX ORDER — GUAIFENESIN 600 MG/1
600 TABLET, EXTENDED RELEASE ORAL 2 TIMES DAILY
Qty: 60 TABLET | Refills: 0 | Status: SHIPPED | OUTPATIENT
Start: 2024-09-27

## 2024-09-27 RX ORDER — OXYCODONE HYDROCHLORIDE 5 MG/1
5 TABLET ORAL EVERY 6 HOURS PRN
Qty: 20 TABLET | Refills: 0 | Status: SHIPPED | OUTPATIENT
Start: 2024-09-27

## 2024-09-27 RX ORDER — LIDOCAINE 560 MG/1
1 PATCH PERCUTANEOUS; TOPICAL; TRANSDERMAL DAILY
Start: 2024-09-27

## 2024-09-27 ASSESSMENT — COGNITIVE AND FUNCTIONAL STATUS - GENERAL
PERSONAL GROOMING: A LITTLE
MOBILITY SCORE: 22
WALKING IN HOSPITAL ROOM: A LITTLE
DAILY ACTIVITIY SCORE: 21
TOILETING: A LITTLE
MOVING FROM LYING ON BACK TO SITTING ON SIDE OF FLAT BED WITH BEDRAILS: A LITTLE
MOBILITY SCORE: 22
CLIMB 3 TO 5 STEPS WITH RAILING: A LITTLE
TURNING FROM BACK TO SIDE WHILE IN FLAT BAD: A LITTLE
DRESSING REGULAR LOWER BODY CLOTHING: A LITTLE

## 2024-09-27 ASSESSMENT — PAIN SCALES - GENERAL
PAINLEVEL_OUTOF10: 0 - NO PAIN
PAINLEVEL_OUTOF10: 0 - NO PAIN

## 2024-09-27 ASSESSMENT — PAIN - FUNCTIONAL ASSESSMENT: PAIN_FUNCTIONAL_ASSESSMENT: 0-10

## 2024-09-27 NOTE — DISCHARGE SUMMARY
"Discharge Diagnosis  Primary adenocarcinoma of lung (Multi)    Issues Requiring Follow-Up  Final pathology     Test Results Pending At Discharge  Pending Labs       Order Current Status    Surgical Pathology Exam In process          Hospital Course   Jayson Elizalde is a 84 y.o. male status post Flexible bronchoscopy, right robotic assisted lower lobe wedge resection, mediastinal lymph node dissection, and intercostal nerve blocks for a biopsy proven adenocarcinoma.     The patient has had an uncomplicated surgical course.  his chest tubes were removed on post operative day 2 (after brief clamp trial) and post pull imaging was without evidence of clinically significant  pneumothorax.     At the time of discharge, his pain was controlled on an oral pain regimen, He was breathing comfortably on room air.  Hewas ambulating independently.  He was tolerating a regular diet without nausea. He was voiding and moving bowels regularly.      The patient was educated on post operative activity restrictions, dietary guidelines, and pain management. He will follow up with Dr. Grullon in the outpatient setting in two weeks with a CXR just prior to this visit. The patient has been instructed to add an OTC stool softeners or laxative while taking oral analgesia.  Deep breathing, coughing, and walking at home encouraged. All questions have been answered and concerns addressed until there were none.     Pertinent Physical Exam At Time of Discharge  General: He is a pleasant male currently in no distress,seen sitting up on the side of the bed.   Visit Vitals  /61 (BP Location: Right arm, Patient Position: Lying)   Pulse 65   Temp 37.1 °C (98.8 °F) (Temporal)   Resp 20   Ht 1.702 m (5' 7\")   Wt 91.4 kg (201 lb 8 oz)   SpO2 93%   BMI 31.56 kg/m²   Smoking Status Former   BSA 2.08 m²      Body mass index is 31.56 kg/m².   HEENT: Normocephalic and atraumatic.   NECK: Supple. Trach midline. No JVD.   CHEST: Breathing comfortably on " RA. Chest tube clamped, removed after clamp trial.   HEART: Regular rate and rhythm. NSR per tele review.   ABDOMEN: Soft, flat, nontender.   : voiding   NEUROLOGIC: Alert and oriented. Grossly intact.   EXTREMITIES: Moves all extremities equally.  Pedal pulses are palpable. No lower extremity edema. No calf tenderness.     Home Medications     Medication List      START taking these medications     guaiFENesin 600 mg 12 hr tablet; Commonly known as: Mucinex; Take 1   tablet (600 mg) by mouth 2 times a day.   lidocaine 4 % patch; Place 1 patch over 12 hours on the skin once daily.   Remove & discard patch within 12 hours or as directed by MD.   oxyCODONE 5 mg immediate release tablet; Commonly known as: Roxicodone;   Take 1 tablet (5 mg) by mouth every 6 hours if needed (pain).     CONTINUE taking these medications     acebutolol 200 mg capsule; Commonly known as: Sectral   acetaminophen 650 mg ER tablet; Commonly known as: Tylenol 8 HOUR   albuterol 90 mcg/actuation inhaler; Commonly known as: Ventolin HFA;   Inhale 2 puffs every 4 hours if needed for wheezing or shortness of   breath.   aspirin 81 mg chewable tablet   fluticasone-umeclidin-vilanter 100-62.5-25 mcg blister with device;   Commonly known as: TRELEGY-ELLIPTA; Inhale 1 puff once daily.   lisinopriL-hydrochlorothiazide 20-12.5 mg tablet   rosuvastatin 10 mg tablet; Commonly known as: Crestor     STOP taking these medications     chlorhexidine 0.12 % solution; Commonly known as: Peridex   chlorhexidine 4 % external liquid; Commonly known as: Hibiclens       Outpatient Follow-Up  Future Appointments   Date Time Provider Department Center   10/15/2024 11:45 AM Cliff Pleitez MD [id number]   1/3/2025  3:20 PM Behzad Jordan MD [id number]     Time spent with discharge was >30 minutes and included explanation of discharge instructions, arranging homegoing prescriptions, supplies and follow-up, and creating the discharge summary of the  patient's hospialization.    Rachel Díza, APRN-CNP

## 2024-09-27 NOTE — CARE PLAN
The patient's goals for the shift include  rest and pain control     The clinical goals for the shift include pt will remian HDS throughout the shift      Problem: Fall/Injury  Goal: Not fall by end of shift  Outcome: Progressing  Goal: Be free from injury by end of the shift  Outcome: Progressing  Goal: Verbalize understanding of personal risk factors for fall in the hospital  Outcome: Progressing  Goal: Verbalize understanding of risk factor reduction measures to prevent injury from fall in the home  Outcome: Progressing  Goal: Use assistive devices by end of the shift  Outcome: Progressing  Goal: Pace activities to prevent fatigue by end of the shift  Outcome: Progressing     Problem: Pain  Goal: Takes deep breaths with improved pain control throughout the shift  Outcome: Progressing  Goal: Turns in bed with improved pain control throughout the shift  Outcome: Progressing  Goal: Walks with improved pain control throughout the shift  Outcome: Progressing  Goal: Performs ADL's with improved pain control throughout shift  Outcome: Progressing  Goal: Participates in PT with improved pain control throughout the shift  Outcome: Progressing  Goal: Free from opioid side effects throughout the shift  Outcome: Progressing  Goal: Free from acute confusion related to pain meds throughout the shift  Outcome: Progressing     Problem: Skin  Goal: Decreased wound size/increased tissue granulation at next dressing change  Outcome: Progressing  Goal: Participates in plan/prevention/treatment measures  Outcome: Progressing  Goal: Prevent/manage excess moisture  Outcome: Progressing  Goal: Prevent/minimize sheer/friction injuries  Outcome: Progressing  Goal: Promote/optimize nutrition  Outcome: Progressing  Goal: Promote skin healing  Outcome: Progressing     Problem: Respiratory  Goal: Clear secretions with interventions this shift  Outcome: Progressing  Goal: Minimize anxiety/maximize coping throughout shift  Outcome:  Progressing  Goal: Minimal/no exertional discomfort or dyspnea this shift  Outcome: Progressing  Goal: No signs of respiratory distress (eg. Use of accessory muscles. Peds grunting)  Outcome: Progressing  Goal: Patent airway maintained this shift  Outcome: Progressing  Goal: Tolerate mechanical ventilation evidenced by VS/agitation level this shift  Outcome: Progressing  Goal: Tolerate pulmonary toileting this shift  Outcome: Progressing  Goal: Verbalize decreased shortness of breath this shift  Outcome: Progressing  Goal: Wean oxygen to maintain O2 saturation per order/standard this shift  Outcome: Progressing  Goal: Increase self care and/or family involvement in next 24 hours  Outcome: Progressing     Problem: Pain - Adult  Goal: Verbalizes/displays adequate comfort level or baseline comfort level  Outcome: Progressing     Problem: Safety - Adult  Goal: Free from fall injury  Outcome: Progressing     Problem: Discharge Planning  Goal: Discharge to home or other facility with appropriate resources  Outcome: Progressing     Problem: Chronic Conditions and Co-morbidities  Goal: Patient's chronic conditions and co-morbidity symptoms are monitored and maintained or improved  Outcome: Progressing

## 2024-09-27 NOTE — PROGRESS NOTES
Physical Therapy    Physical Therapy Treatment    Patient Name: Jayson Elizalde  MRN: [id number]  Department: [place] [id number]  Room: [address] [place] [address]  Today's Date: 9/27/2024  Time Calculation  Start Time: 1158  Stop Time: 1206  Time Calculation (min): 8 min      Assessment/Plan     PT Assessment  Evaluation/Treatment Tolerance: Patient tolerated treatment well  Medical Staff Made Aware: Yes  End of Session Communication: Bedside nurse    Assessment Comment: Pt able to complete transfers, ambulation, and stair navigation all at close supervision with no AD. No LOB noted. Vitals remained stable throughout session. PT still anticipates no needs upon discharge.    End of Session Patient Position: Up in chair, Alarm off, not on at start of session     PT Plan  Treatment/Interventions: Bed mobility, Transfer training, Gait training, Stair training, Balance training, Strengthening, Endurance training, Therapeutic exercise, Therapeutic activity, Home exercise program  PT Plan: Ongoing PT  PT Frequency: 3 times per week  PT Discharge Recommendations: No PT needed after discharge  PT Recommended Transfer Status: Stand by assist  PT - OK to Discharge: Yes    General Visit Information:   PT  Visit  PT Received On: 09/27/24  Response to Previous Treatment: Patient with no complaints from previous session.  General  Missed Visit: No  Family/Caregiver Present: Yes  Caregiver Feedback: Dtr present for session  Prior to Session Communication: Bedside nurse  Patient Position Received: Up in chair, Alarm off, not on at start of session  General Comment: Pt awake, alert, and willing to participate in PT treatment      Subjective     Precautions:  Precautions  Hearing/Visual Limitations: Appears [place]  Medical Precautions: Fall precautions    Vital Signs (Past 2hrs)        Date/Time Vitals Session Patient Position Pulse Resp SpO2 BP MAP (mmHg)    09/27/24 1158 During PT  Ambulating  --  --  95 %  --  --                   Vital Signs Comment: Pt SPO2  stayed at 95% or above with ambulation and after stair navigation this session       Objective     Pain:  Pain Assessment  Pain Assessment: 0-10  0-10 (Numeric) Pain Score: 0 - No pain    Cognition:  Cognition  Overall Cognitive Status: Within Functional Limits  Orientation Level: Oriented X4    Treatments:  Therapeutic Activity  Therapeutic Activity Performed: Yes  Therapeutic Activity 1: Ambulated with no AD and navigated stairs all at close sup while monitoring SPO2    Ambulation/Gait Training  Ambulation/Gait Training Performed: Yes  Ambulation/Gait Training 1  Surface 1: Level tile  Device 1: No device  Assistance 1: Close supervision  Comments/Distance (ft) 1: ~300 ft during first trial, standing rest break at [facility] point to check SPO2 which was 95%  Ambulation/Gait Training 2  Surface 2: Level tile  Device 2: No device  Assistance 2: Distant supervision  Comments/Distance (ft) 2: ~50 ft back to room from [facility]  Transfer: Yes  Transfer 1  Transfer From 1: Sit to, Stand to  Transfer to 1: Stand, Sit  Technique 1: Sit to stand, Stand to sit  Transfer Level of Assistance 1: Close supervision    Stairs  Stairs: Yes  Stairs  Rails 1: Bilateral  Curb Step 1: No  Device 1: Railing  Assistance 1: Close supervision  Comment/Number of Steps 1: Ascended/descended 13 steps with B rails, SPO2 measured after stair navigation at 96%    Outcome Measures:  [facility] Basic Mobility  Turning from your back to your side while in a flat bed without using bedrails: A little  Moving from lying on your back to sitting on the side of a flat bed without using bedrails: A little  Moving to and from bed to chair (including a wheelchair): None  Standing up from a chair using your arms (e.g. wheelchair or bedside chair): None  To walk in hospital room: None  Climbing 3-5 steps with railing: None  Basic Mobility - Total Score: 22    Education Documentation  Precautions, taught by ZACK Olmedo at 9/27/2024 12:51  PM.  Learner: Patient  Readiness: Acceptance  Method: Explanation  Response: Verbalizes Understanding    Body Mechanics, taught by DANY OlmedoPT at 9/27/2024 12:51 PM.  Learner: Patient  Readiness: Acceptance  Method: Explanation  Response: Verbalizes Understanding    Mobility Training, taught by ZACK Olmedo at 9/27/2024 12:51 PM.  Learner: Patient  Readiness: Acceptance  Method: Explanation  Response: Verbalizes Understanding    Education Comments  No comments found.      Encounter Problems       Encounter Problems (Active)       Balance       Patient to demonstrate Sarah static and dynamic standing balance without LOB with change of directions, no hesitancy, appropriate CHRISTINE and sequencing to complete functional task.  (Progressing)       Start:  09/26/24    Expected End:  10/10/24            Pt will score >= 24/28 on Tinetti balance assessment to indicate low falls risk (Progressing)       Start:  09/26/24    Expected End:  10/10/24               Mobility       STG - Patient will ambulate >/= 300 ft Sarah with LRAD (Progressing)       Start:  09/26/24    Expected End:  10/10/24            Patient to ascend/descend >/= 15 stairs with HR and SBA to demo ability to safely traverse ARIAN  (Progressing)       Start:  09/26/24    Expected End:  10/10/24            Patient will tolerate >/=30 minutes continuous activity with stable vital signs, RPE </=13/20, RPD </=3/10  (Progressing)       Start:  09/26/24    Expected End:  10/10/24               PT Transfers       STG - Patient will perform bed mobility Sarah with HOB flat and no rails to simulate home set up (Progressing)       Start:  09/26/24    Expected End:  10/10/24            STG - Patient will transfer sit to and from stand Sarah with LRAD (Progressing)       Start:  09/26/24    Expected End:  10/10/24               Pain - Adult

## 2024-10-02 ENCOUNTER — HOSPITAL ENCOUNTER (OUTPATIENT)
Dept: RADIOLOGY | Facility: HOSPITAL | Age: 84
Discharge: HOME | End: 2024-10-02
Payer: MEDICARE

## 2024-10-02 ENCOUNTER — TELEPHONE (OUTPATIENT)
Dept: CARDIOLOGY | Facility: CLINIC | Age: 84
End: 2024-10-02
Payer: MEDICARE

## 2024-10-02 DIAGNOSIS — Z09 S/P LUNG SURGERY, FOLLOW-UP EXAM: ICD-10-CM

## 2024-10-02 PROCEDURE — 71046 X-RAY EXAM CHEST 2 VIEWS: CPT

## 2024-10-02 NOTE — TELEPHONE ENCOUNTER
Call from grand daughter describing increased pain at site of incision/flank after one week of pain being managed at home. Per Dr. Grullon, pt to use motrin/tylenol around the clock with oxy as needed. CXR ordered. Updated family.

## 2024-10-07 DIAGNOSIS — J43.9 PULMONARY EMPHYSEMA, UNSPECIFIED EMPHYSEMA TYPE (MULTI): Primary | ICD-10-CM

## 2024-10-10 DIAGNOSIS — Z09 S/P LUNG SURGERY, FOLLOW-UP EXAM: ICD-10-CM

## 2024-10-11 LAB
LAB AP BLOCK FOR ADDITIONAL STUDIES: NORMAL
LABORATORY COMMENT REPORT: NORMAL
Lab: NORMAL
PATH REPORT.COMMENTS IMP SPEC: NORMAL
PATH REPORT.FINAL DX SPEC: NORMAL
PATH REPORT.GROSS SPEC: NORMAL
PATH REPORT.RELEVANT HX SPEC: NORMAL
PATH REPORT.TOTAL CANCER: NORMAL
PATHOLOGY SYNOPTIC REPORT: NORMAL

## 2024-10-15 ENCOUNTER — OFFICE VISIT (OUTPATIENT)
Dept: CARDIAC SURGERY | Facility: CLINIC | Age: 84
End: 2024-10-15
Payer: MEDICARE

## 2024-10-15 ENCOUNTER — APPOINTMENT (OUTPATIENT)
Dept: CARDIAC SURGERY | Facility: CLINIC | Age: 84
End: 2024-10-15
Payer: MEDICARE

## 2024-10-15 ENCOUNTER — HOSPITAL ENCOUNTER (OUTPATIENT)
Dept: RADIOLOGY | Facility: HOSPITAL | Age: 84
Discharge: HOME | End: 2024-10-15
Payer: MEDICARE

## 2024-10-15 VITALS
BODY MASS INDEX: 32.02 KG/M2 | SYSTOLIC BLOOD PRESSURE: 175 MMHG | HEART RATE: 80 BPM | HEIGHT: 67 IN | DIASTOLIC BLOOD PRESSURE: 82 MMHG | RESPIRATION RATE: 18 BRPM | OXYGEN SATURATION: 98 % | WEIGHT: 204 LBS

## 2024-10-15 DIAGNOSIS — Z09 S/P LUNG SURGERY, FOLLOW-UP EXAM: ICD-10-CM

## 2024-10-15 DIAGNOSIS — C34.91 NON-SMALL CELL CANCER OF RIGHT LUNG (MULTI): Primary | ICD-10-CM

## 2024-10-15 PROCEDURE — 1126F AMNT PAIN NOTED NONE PRSNT: CPT | Performed by: THORACIC SURGERY (CARDIOTHORACIC VASCULAR SURGERY)

## 2024-10-15 PROCEDURE — 1111F DSCHRG MED/CURRENT MED MERGE: CPT | Performed by: THORACIC SURGERY (CARDIOTHORACIC VASCULAR SURGERY)

## 2024-10-15 PROCEDURE — 99211 OFF/OP EST MAY X REQ PHY/QHP: CPT | Performed by: THORACIC SURGERY (CARDIOTHORACIC VASCULAR SURGERY)

## 2024-10-15 PROCEDURE — 71046 X-RAY EXAM CHEST 2 VIEWS: CPT | Performed by: RADIOLOGY

## 2024-10-15 PROCEDURE — 71046 X-RAY EXAM CHEST 2 VIEWS: CPT

## 2024-10-15 PROCEDURE — 3077F SYST BP >= 140 MM HG: CPT | Performed by: THORACIC SURGERY (CARDIOTHORACIC VASCULAR SURGERY)

## 2024-10-15 PROCEDURE — 1036F TOBACCO NON-USER: CPT | Performed by: THORACIC SURGERY (CARDIOTHORACIC VASCULAR SURGERY)

## 2024-10-15 PROCEDURE — 1159F MED LIST DOCD IN RCRD: CPT | Performed by: THORACIC SURGERY (CARDIOTHORACIC VASCULAR SURGERY)

## 2024-10-15 PROCEDURE — 3079F DIAST BP 80-89 MM HG: CPT | Performed by: THORACIC SURGERY (CARDIOTHORACIC VASCULAR SURGERY)

## 2024-10-15 ASSESSMENT — ENCOUNTER SYMPTOMS
NEUROLOGICAL NEGATIVE: 1
ALLERGIC/IMMUNOLOGIC NEGATIVE: 1
DIAPHORESIS: 0
ENDOCRINE NEGATIVE: 1
CHILLS: 0
DEPRESSION: 0
NAUSEA: 0
ABDOMINAL DISTENTION: 0
PALPITATIONS: 0
COUGH: 0
WHEEZING: 0
LOSS OF SENSATION IN FEET: 0
APPETITE CHANGE: 0
CONSTIPATION: 0
CHEST TIGHTNESS: 0
DIARRHEA: 0
PSYCHIATRIC NEGATIVE: 1
EYES NEGATIVE: 1
MUSCULOSKELETAL NEGATIVE: 1
OCCASIONAL FEELINGS OF UNSTEADINESS: 0
CHOKING: 0
FATIGUE: 0
STRIDOR: 0
UNEXPECTED WEIGHT CHANGE: 0
VOMITING: 0
SHORTNESS OF BREATH: 0
FEVER: 0
ABDOMINAL PAIN: 0
HEMATOLOGIC/LYMPHATIC NEGATIVE: 1

## 2024-10-15 ASSESSMENT — LIFESTYLE VARIABLES
HOW OFTEN DO YOU HAVE SIX OR MORE DRINKS ON ONE OCCASION: NEVER
AUDIT-C TOTAL SCORE: 0
HOW OFTEN DO YOU HAVE A DRINK CONTAINING ALCOHOL: NEVER
SKIP TO QUESTIONS 9-10: 1
HOW MANY STANDARD DRINKS CONTAINING ALCOHOL DO YOU HAVE ON A TYPICAL DAY: PATIENT DOES NOT DRINK

## 2024-10-15 ASSESSMENT — PAIN SCALES - GENERAL: PAINLEVEL: 0-NO PAIN

## 2024-10-15 NOTE — PROGRESS NOTES
Subjective   Patient ID: Jayson Elizalde is a 84 y.o. male who presents for Post-op Visit (cabg).  HPI    84-year-old male with a history of heavy smoking who quit 40 years ago and who was found to have a right lower lobe lung nodule.  He underwent a PET scan showing an hypermetabolic lesion in the right lower lobe suspicious for malignancy and no other evidence of lymphadenopathy or metastatic disease.  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  He is biopsy was complicated by a pneumothorax that required admission to the hospital.  He underwent PFTs as well showing an FEV1 of 46% of predicted and a DLCO of 79% of predicted.  I had a discussion with him about the next steps.  He is a very active man who does not endorse any shortness of breath.  Also, the location of this nodule is favorable for a sublobar resection.  We had a discussion about the mechanics of a robotic lung resection with lymphadenectomy.  We discussed the expected recovery potential complications.  We will proceed on September 25 at [facility].    Update 10/15/2024  He has recovered well from his lung resection.  His pathology revealed an adenocarcinoma measuring 1.4 cm with negative margins and negative lymph nodes.  His chest x-ray showing good lung expansion.  Wounds are well-healed.  Follow-up in 6 months with CT of the chest.    Review of Systems   Constitutional:  Negative for appetite change, chills, diaphoresis, fatigue, fever and unexpected weight change.   HENT: Negative.     Eyes: Negative.    Respiratory:  Negative for cough, choking, chest tightness, shortness of breath, wheezing and stridor.    Cardiovascular:  Negative for chest pain, palpitations and leg swelling.   Gastrointestinal:  Negative for abdominal distention, abdominal pain, constipation, diarrhea, nausea and vomiting.   Endocrine: Negative.    Genitourinary: Negative.    Musculoskeletal: Negative.    Skin: Negative.    Allergic/Immunologic:  Negative.    Neurological: Negative.    Hematological: Negative.    Psychiatric/Behavioral: Negative.     All other systems reviewed and are negative.      Objective   Physical Exam  Constitutional:       Appearance: Normal appearance.   HENT:      Head: Normocephalic and atraumatic.      Nose: Nose normal.      Mouth/Throat:      Mouth: Mucous membranes are moist.      Pharynx: Oropharynx is clear.   Eyes:      Extraocular Movements: Extraocular movements intact.      Conjunctiva/sclera: Conjunctivae normal.      Pupils: Pupils are equal, round, and reactive to light.   Cardiovascular:      Rate and Rhythm: Normal rate and regular rhythm.      Pulses: Normal pulses.      Heart sounds: Normal heart sounds.   Pulmonary:      Effort: Pulmonary effort is normal. No respiratory distress.      Breath sounds: Normal breath sounds. No stridor. No wheezing, rhonchi or rales.   Chest:      Chest wall: No tenderness.   Abdominal:      General: Abdomen is flat. Bowel sounds are normal.      Palpations: Abdomen is soft.   Musculoskeletal:         General: Normal range of motion.      Cervical back: Normal range of motion and neck supple.   Skin:     General: Skin is warm and dry.      Capillary Refill: Capillary refill takes less than 2 seconds.   Neurological:      General: No focal deficit present.      Mental Status: He is alert and oriented to person, place, and time.         Assessment/Plan   Diagnoses and all orders for this visit:  Non-small cell cancer of right lung (Multi)    Follow-up in 6 months with a CT of the chest.         Cliff Pleitez MD 10/15/24 12:23 PM

## 2024-10-16 ENCOUNTER — TELEPHONE (OUTPATIENT)
Dept: CARDIAC SURGERY | Facility: CLINIC | Age: 84
End: 2024-10-16
Payer: MEDICARE

## 2024-10-16 DIAGNOSIS — Z98.890 HISTORY OF LUNG SURGERY: ICD-10-CM

## 2025-01-03 ENCOUNTER — OFFICE VISIT (OUTPATIENT)
Dept: PULMONOLOGY | Facility: CLINIC | Age: 85
End: 2025-01-03
Payer: MEDICARE

## 2025-01-03 VITALS
HEART RATE: 66 BPM | SYSTOLIC BLOOD PRESSURE: 159 MMHG | OXYGEN SATURATION: 93 % | DIASTOLIC BLOOD PRESSURE: 78 MMHG | RESPIRATION RATE: 16 BRPM | WEIGHT: 211 LBS | BODY MASS INDEX: 33.05 KG/M2

## 2025-01-03 DIAGNOSIS — J43.9 PULMONARY EMPHYSEMA, UNSPECIFIED EMPHYSEMA TYPE (MULTI): ICD-10-CM

## 2025-01-03 DIAGNOSIS — C34.91 ADENOCARCINOMA OF RIGHT LUNG (MULTI): Primary | ICD-10-CM

## 2025-01-03 PROCEDURE — 3077F SYST BP >= 140 MM HG: CPT | Performed by: INTERNAL MEDICINE

## 2025-01-03 PROCEDURE — 99213 OFFICE O/P EST LOW 20 MIN: CPT | Performed by: INTERNAL MEDICINE

## 2025-01-03 PROCEDURE — 1159F MED LIST DOCD IN RCRD: CPT | Performed by: INTERNAL MEDICINE

## 2025-01-03 PROCEDURE — 1160F RVW MEDS BY RX/DR IN RCRD: CPT | Performed by: INTERNAL MEDICINE

## 2025-01-03 PROCEDURE — 1036F TOBACCO NON-USER: CPT | Performed by: INTERNAL MEDICINE

## 2025-01-03 PROCEDURE — 3078F DIAST BP <80 MM HG: CPT | Performed by: INTERNAL MEDICINE

## 2025-01-03 ASSESSMENT — PATIENT HEALTH QUESTIONNAIRE - PHQ9
SUM OF ALL RESPONSES TO PHQ9 QUESTIONS 1 AND 2: 0
2. FEELING DOWN, DEPRESSED OR HOPELESS: NOT AT ALL
1. LITTLE INTEREST OR PLEASURE IN DOING THINGS: NOT AT ALL

## 2025-01-03 ASSESSMENT — ENCOUNTER SYMPTOMS
PSYCHIATRIC NEGATIVE: 1
DEPRESSION: 0
NEUROLOGICAL NEGATIVE: 1
CHILLS: 0
FEVER: 0
COUGH: 0
GASTROINTESTINAL NEGATIVE: 1
CARDIOVASCULAR NEGATIVE: 1
RESPIRATORY NEGATIVE: 1

## 2025-01-03 NOTE — PROGRESS NOTES
Subjective   Patient ID: Jayson Elizalde is a 84 y.o. male who presents for COPD.  H/o HTN, lung adeno here for follow-up of emphysema.   Mod obstruction + BD (7/2024).  RAST neg.  S/p resection for lung adeno.     No fevers, chills, cough.  On Trelegy.  Has not needed rescue.  ET is about 150ft.        Review of Systems   Constitutional:  Negative for chills and fever.   Respiratory: Negative.  Negative for cough.    Cardiovascular: Negative.    Gastrointestinal: Negative.    Skin:  Negative for rash.   Neurological: Negative.    Psychiatric/Behavioral: Negative.     All other systems reviewed and are negative.      Objective   Physical Exam  Vitals reviewed.   Constitutional:       Appearance: Normal appearance.   HENT:      Head: Normocephalic and atraumatic.   Eyes:      Extraocular Movements: Extraocular movements intact.   Cardiovascular:      Rate and Rhythm: Normal rate and regular rhythm.      Heart sounds: Normal heart sounds.   Pulmonary:      Effort: Pulmonary effort is normal.      Breath sounds: Normal breath sounds.   Abdominal:      Palpations: Abdomen is soft.      Tenderness: There is no abdominal tenderness.   Musculoskeletal:      Cervical back: Normal range of motion.   Skin:     General: Skin is warm.   Neurological:      General: No focal deficit present.      Mental Status: He is alert and oriented to person, place, and time. Mental status is at baseline.   Psychiatric:         Mood and Affect: Mood normal.         Behavior: Behavior normal.         Assessment/Plan   Problem List Items Addressed This Visit       Adenocarcinoma, lung (Multi) - Primary     S/p resection ,followed by thoracic surgery         Pulmonary emphysema (Multi)     Mod obstruction + BD 7/2024.  Cont Trelegy and albuterol.  Consider [facility] referral in future.  RAST neg          RTC in 6 months    Time Spent  Prep time on day of patient encounter: 5 minutes  Time spent directly with patient, family or caregiver: 10  minutes  Additional Time Spent on Patient Care Activities: 0 minutes  Documentation Time: 5 minutes  Other Time Spent: 0 minutes  Total: 20 minutes        Behzad Jordan MD 01/03/25 8:04 PM

## 2025-01-04 NOTE — ASSESSMENT & PLAN NOTE
Mod obstruction + BD 7/2024.  Cont Trelegy and albuterol.  Consider [facility] referral in future.  RAST neg

## 2025-04-16 ENCOUNTER — HOSPITAL ENCOUNTER (OUTPATIENT)
Dept: RADIOLOGY | Facility: HOSPITAL | Age: 85
Discharge: HOME | End: 2025-04-16
Payer: MEDICARE

## 2025-04-16 DIAGNOSIS — Z98.890 HISTORY OF LUNG SURGERY: ICD-10-CM

## 2025-04-16 PROCEDURE — 71250 CT THORAX DX C-: CPT

## 2025-05-06 ENCOUNTER — OFFICE VISIT (OUTPATIENT)
Facility: CLINIC | Age: 85
End: 2025-05-06
Payer: MEDICARE

## 2025-05-06 VITALS
WEIGHT: 198 LBS | DIASTOLIC BLOOD PRESSURE: 89 MMHG | BODY MASS INDEX: 31.08 KG/M2 | OXYGEN SATURATION: 97 % | RESPIRATION RATE: 18 BRPM | HEIGHT: 67 IN | HEART RATE: 60 BPM | SYSTOLIC BLOOD PRESSURE: 172 MMHG

## 2025-05-06 DIAGNOSIS — Z98.890 HISTORY OF LUNG SURGERY: ICD-10-CM

## 2025-05-06 DIAGNOSIS — C34.91 NON-SMALL CELL CANCER OF RIGHT LUNG (MULTI): Primary | ICD-10-CM

## 2025-05-06 PROCEDURE — 1125F AMNT PAIN NOTED PAIN PRSNT: CPT | Performed by: THORACIC SURGERY (CARDIOTHORACIC VASCULAR SURGERY)

## 2025-05-06 PROCEDURE — 1036F TOBACCO NON-USER: CPT | Performed by: THORACIC SURGERY (CARDIOTHORACIC VASCULAR SURGERY)

## 2025-05-06 PROCEDURE — 99215 OFFICE O/P EST HI 40 MIN: CPT | Performed by: THORACIC SURGERY (CARDIOTHORACIC VASCULAR SURGERY)

## 2025-05-06 PROCEDURE — 1159F MED LIST DOCD IN RCRD: CPT | Performed by: THORACIC SURGERY (CARDIOTHORACIC VASCULAR SURGERY)

## 2025-05-06 PROCEDURE — 3077F SYST BP >= 140 MM HG: CPT | Performed by: THORACIC SURGERY (CARDIOTHORACIC VASCULAR SURGERY)

## 2025-05-06 PROCEDURE — 3079F DIAST BP 80-89 MM HG: CPT | Performed by: THORACIC SURGERY (CARDIOTHORACIC VASCULAR SURGERY)

## 2025-05-06 ASSESSMENT — ENCOUNTER SYMPTOMS
APPETITE CHANGE: 0
VOMITING: 0
DIAPHORESIS: 0
STRIDOR: 0
DEPRESSION: 0
ABDOMINAL DISTENTION: 0
ENDOCRINE NEGATIVE: 1
NEUROLOGICAL NEGATIVE: 1
OCCASIONAL FEELINGS OF UNSTEADINESS: 0
FATIGUE: 0
WHEEZING: 0
EYES NEGATIVE: 1
UNEXPECTED WEIGHT CHANGE: 0
PSYCHIATRIC NEGATIVE: 1
CHOKING: 0
FEVER: 0
ABDOMINAL PAIN: 0
ALLERGIC/IMMUNOLOGIC NEGATIVE: 1
DIARRHEA: 0
HEMATOLOGIC/LYMPHATIC NEGATIVE: 1
NAUSEA: 0
SHORTNESS OF BREATH: 0
MUSCULOSKELETAL NEGATIVE: 1
CONSTIPATION: 0
CHILLS: 0
PALPITATIONS: 0
COUGH: 0
LOSS OF SENSATION IN FEET: 0
CHEST TIGHTNESS: 0

## 2025-05-06 ASSESSMENT — LIFESTYLE VARIABLES
HOW OFTEN DO YOU HAVE SIX OR MORE DRINKS ON ONE OCCASION: NEVER
HOW OFTEN DO YOU HAVE A DRINK CONTAINING ALCOHOL: 4 OR MORE TIMES A WEEK
HOW MANY STANDARD DRINKS CONTAINING ALCOHOL DO YOU HAVE ON A TYPICAL DAY: 1 OR 2
SKIP TO QUESTIONS 9-10: 1
AUDIT-C TOTAL SCORE: 4

## 2025-05-06 ASSESSMENT — PAIN SCALES - GENERAL: PAINLEVEL_OUTOF10: 3

## 2025-05-06 NOTE — PROGRESS NOTES
Subjective   Patient ID: Jayson Elizalde is a 85 y.o. male who presents for Follow-up (CT 6 mo).  HPI    84-year-old male with a history of heavy smoking who quit 40 years ago and who was found to have a right lower lobe lung nodule.  He underwent a PET scan showing an hypermetabolic lesion in the right lower lobe suspicious for malignancy and no other evidence of lymphadenopathy or metastatic disease.  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  He is biopsy was complicated by a pneumothorax that required admission to the hospital.  He underwent PFTs as well showing an FEV1 of 46% of predicted and a DLCO of 79% of predicted.  I had a discussion with him about the next steps.  He is a very active man who does not endorse any shortness of breath.  Also, the location of this nodule is favorable for a sublobar resection.  We had a discussion about the mechanics of a robotic lung resection with lymphadenectomy.  We discussed the expected recovery potential complications.  We will proceed on September 25 at [facility].    Update 10/15/2024  He has recovered well from his lung resection.  His pathology revealed an adenocarcinoma measuring 1.4 cm with negative margins and negative lymph nodes.  His chest x-ray showing good lung expansion.  Wounds are well-healed.  Follow-up in 6 months with CT of the chest.    Update 5/6/2025  He has been doing well.  Some mild discomfort at the level of the incisions.  He is breathing well.  CT scan done shows no evidence of recurrence.  We discussed next steps and I want to see him again in 6 months with a CT of the chest.    Review of Systems   Constitutional:  Negative for appetite change, chills, diaphoresis, fatigue, fever and unexpected weight change.   HENT: Negative.     Eyes: Negative.    Respiratory:  Negative for cough, choking, chest tightness, shortness of breath, wheezing and stridor.    Cardiovascular:  Negative for chest pain, palpitations and leg  swelling.   Gastrointestinal:  Negative for abdominal distention, abdominal pain, constipation, diarrhea, nausea and vomiting.   Endocrine: Negative.    Genitourinary: Negative.    Musculoskeletal: Negative.    Skin: Negative.    Allergic/Immunologic: Negative.    Neurological: Negative.    Hematological: Negative.    Psychiatric/Behavioral: Negative.     All other systems reviewed and are negative.      Objective   Physical Exam  Constitutional:       Appearance: Normal appearance.   HENT:      Head: Normocephalic and atraumatic.      Nose: Nose normal.      Mouth/Throat:      Mouth: Mucous membranes are moist.      Pharynx: Oropharynx is clear.   Eyes:      Extraocular Movements: Extraocular movements intact.      Conjunctiva/sclera: Conjunctivae normal.      Pupils: Pupils are equal, round, and reactive to light.   Cardiovascular:      Rate and Rhythm: Normal rate and regular rhythm.      Pulses: Normal pulses.      Heart sounds: Normal heart sounds.   Pulmonary:      Effort: Pulmonary effort is normal. No respiratory distress.      Breath sounds: Normal breath sounds. No stridor. No wheezing, rhonchi or rales.   Chest:      Chest wall: No tenderness.   Abdominal:      General: Abdomen is flat. Bowel sounds are normal.      Palpations: Abdomen is soft.   Musculoskeletal:         General: Normal range of motion.      Cervical back: Normal range of motion and neck supple.   Skin:     General: Skin is warm and dry.      Capillary Refill: Capillary refill takes less than 2 seconds.   Neurological:      General: No focal deficit present.      Mental Status: He is alert and oriented to person, place, and time.         Assessment/Plan   Diagnoses and all orders for this visit:  Non-small cell cancer of right lung (Multi)      Follow-up in 6 months with a CT of the chest.         Cliff Pleitez MD 05/06/25 10:49 AM

## 2025-06-18 ENCOUNTER — APPOINTMENT (OUTPATIENT)
Dept: PULMONOLOGY | Facility: CLINIC | Age: 85
End: 2025-06-18
Payer: MEDICARE

## (undated) DEVICE — SUTURE, ETHIBOND, XTRA, 30 IN, 0, CT-1, GREEN

## (undated) DEVICE — TUBING, SUCTION, CONNECTING, STERILE 0.25 X 120 IN., LF

## (undated) DEVICE — DRAPE, SHEET, ENDOSCOPY, GENERAL, FENESTRATED, ARMBOARD COVER, 98 X 123.5 IN, DISPOSABLE, LF, STERILE

## (undated) DEVICE — STAPLER, SUREFORM 45, DAVINCI XI

## (undated) DEVICE — GRASPER, LONG, BIPOLAR, DAVINCI XI

## (undated) DEVICE — DRAPE, ARM XI

## (undated) DEVICE — CATHETER TRAY, SURESTEP, 16FR, URINE METER W/STATLOCK

## (undated) DEVICE — DRESSING, ADHESIVE, ISLAND, TELFA, 2 X 3.75 IN, LF

## (undated) DEVICE — SPONGE, LAP, XRAY DECT, 18IN X 18IN, W/LOOP, STERILE

## (undated) DEVICE — OBTURATOR, BLADELESS , SU

## (undated) DEVICE — TOWEL, SURGICAL, NEURO, O/R, 16 X 26, BLUE, STERILE

## (undated) DEVICE — SEAL, UNIVERSAL, 5-12MM

## (undated) DEVICE — BAG, TISSUE RETRIEVAL, 10MM, ANCHOR

## (undated) DEVICE — RELOAD, SUREFORM 45, 4.6 BLACK

## (undated) DEVICE — ACCESS PORT, 12MM, 100MM LENGTH, LOW PROFILE W/BLADELESS OPTICAL TIP

## (undated) DEVICE — DRAPE, COLUMN, DAVINCI XI

## (undated) DEVICE — ELECTRODE, ELECTROSURGICAL, BLADE, INSULATED, ENT/IMA, STERILE

## (undated) DEVICE — SUTURE, VICRYL, 4-0, 18 IN, PS2, UNDYED

## (undated) DEVICE — GRASPER, FENESTRATED TIP-UP XI

## (undated) DEVICE — COVER, CART, 45 X 27 X 48 IN, CLEAR

## (undated) DEVICE — CATHETER, THORACIC, STRAIGHT, ADULT, 28 FR, PVC

## (undated) DEVICE — DRAPE, INCISE, ANTIMICROBIAL, IOBAN 2, LARGE, 17 X 23 IN, DISPOSABLE, STERILE

## (undated) DEVICE — DRESSING, ISLAND, TELFA, 4 X 5 IN

## (undated) DEVICE — TUBING SET, TRI-LUMEN, FILTERED, F/AIRSEAL

## (undated) DEVICE — MANIFOLD, 4 PORT NEPTUNE STANDARD

## (undated) DEVICE — FORCEPS, CADIERE, DAVINCI XI

## (undated) DEVICE — KIT, ROBOTIC, CUSTOM UHC

## (undated) DEVICE — Device

## (undated) DEVICE — LOOP, VESSEL, MAXI, RED

## (undated) DEVICE — DRAPE, SHEET, FAN FOLDED, HALF, 44 X 58 IN, DISPOSABLE, LF, STERILE

## (undated) DEVICE — SPONGE GAUZE, XRAY SC+RFID, 4X4 16 PLY, STERILE

## (undated) DEVICE — SUTURE, VICRYL, 2-0, 36 IN, CT-1, UNDYED

## (undated) DEVICE — GOWN, ASTOUND, XL

## (undated) DEVICE — STRIP, SKIN CLOSURE, STERI STRIP, REINFORCED, 0.5 X 4 IN

## (undated) DEVICE — GAUZE, KITTNER ROLL, STERILE